# Patient Record
Sex: FEMALE | Race: WHITE | NOT HISPANIC OR LATINO | Employment: FULL TIME | URBAN - METROPOLITAN AREA
[De-identification: names, ages, dates, MRNs, and addresses within clinical notes are randomized per-mention and may not be internally consistent; named-entity substitution may affect disease eponyms.]

---

## 2017-07-28 ENCOUNTER — OFFICE VISIT (OUTPATIENT)
Dept: FAMILY MEDICINE | Facility: CLINIC | Age: 57
End: 2017-07-28
Payer: COMMERCIAL

## 2017-07-28 VITALS
HEIGHT: 66 IN | DIASTOLIC BLOOD PRESSURE: 74 MMHG | HEART RATE: 62 BPM | RESPIRATION RATE: 18 BRPM | SYSTOLIC BLOOD PRESSURE: 111 MMHG | BODY MASS INDEX: 25.07 KG/M2 | WEIGHT: 156 LBS

## 2017-07-28 DIAGNOSIS — Z00.01 ENCOUNTER FOR GENERAL ADULT MEDICAL EXAMINATION WITH ABNORMAL FINDINGS: Primary | ICD-10-CM

## 2017-07-28 DIAGNOSIS — Z11.59 ENCOUNTER FOR HCV SCREENING TEST FOR LOW RISK PATIENT: ICD-10-CM

## 2017-07-28 DIAGNOSIS — R22.2 CHEST WALL MASS: ICD-10-CM

## 2017-07-28 LAB
CHOLEST SERPL-MCNC: 183 MG/DL
GLUCOSE SERPL-MCNC: 83 MG/DL (ref 70–99)
HCV AB SERPL QL IA: NORMAL
HDLC SERPL-MCNC: 73 MG/DL
LDLC SERPL CALC-MCNC: 99 MG/DL
NONHDLC SERPL-MCNC: 110 MG/DL
TRIGL SERPL-MCNC: 57 MG/DL

## 2017-07-28 PROCEDURE — 80061 LIPID PANEL: CPT | Performed by: FAMILY MEDICINE

## 2017-07-28 PROCEDURE — 86803 HEPATITIS C AB TEST: CPT | Performed by: FAMILY MEDICINE

## 2017-07-28 PROCEDURE — G0145 SCR C/V CYTO,THINLAYER,RESCR: HCPCS | Performed by: FAMILY MEDICINE

## 2017-07-28 PROCEDURE — 99396 PREV VISIT EST AGE 40-64: CPT | Performed by: FAMILY MEDICINE

## 2017-07-28 PROCEDURE — 36415 COLL VENOUS BLD VENIPUNCTURE: CPT | Performed by: FAMILY MEDICINE

## 2017-07-28 PROCEDURE — 82947 ASSAY GLUCOSE BLOOD QUANT: CPT | Performed by: FAMILY MEDICINE

## 2017-07-28 PROCEDURE — 87624 HPV HI-RISK TYP POOLED RSLT: CPT | Performed by: FAMILY MEDICINE

## 2017-07-28 NOTE — PROGRESS NOTES
Mare,    All of the labs were normal or acceptable.    Please contact my office if you have questions.  Hep C pending.  Carley Kern M.D.

## 2017-07-28 NOTE — MR AVS SNAPSHOT
After Visit Summary   7/28/2017    Mare Dominguez    MRN: [id number]           Patient Information     Date Of Birth          1960        Visit Information        Provider Department      7/28/2017 9:20 AM Carley Kern MD [facility]        Today's Diagnoses     Encounter for general adult medical examination with abnormal findings    -  1    Encounter for HCV screening test for low risk patient        Chest wall mass          Care Instructions      Preventive Health Recommendations  Female Ages 50 - 64    Yearly exam: See your health care provider every year in order to  o Review health changes.   o Discuss preventive care.    o Review your medicines if your doctor has prescribed any.      Get a Pap test every three years (unless you have an abnormal result and your provider advises testing more often).    If you get Pap tests with HPV test, you only need to test every 5 years, unless you have an abnormal result.     You do not need a Pap test if your uterus was removed (hysterectomy) and you have not had cancer.    You should be tested each year for STDs (sexually transmitted diseases) if you're at risk.     Have a mammogram every 1 to 2 years.    Have a colonoscopy at age 50, or have a yearly FIT test (stool test). These exams screen for colon cancer.      Have a cholesterol test every 5 years, or more often if advised.    Have a diabetes test (fasting glucose) every three years. If you are at risk for diabetes, you should have this test more often.     If you are at risk for osteoporosis (brittle bone disease), think about having a bone density scan (DEXA).    Shots: Get a flu shot each year. Get a tetanus shot every 10 years.    Nutrition:     Eat at least 5 servings of fruits and vegetables each day.    Eat whole-grain bread, whole-wheat pasta and brown rice instead of white grains and rice.    Talk to your provider about Calcium and Vitamin D.  "    Lifestyle    Exercise at least 150 minutes a week (30 minutes a day, 5 days a week). This will help you control your weight and prevent disease.    Limit alcohol to one drink per day.    No smoking.     Wear sunscreen to prevent skin cancer.     See your dentist every six months for an exam and cleaning.    See your eye doctor every 1 to 2 years.            Follow-ups after your visit        Your next 10 appointments already scheduled     Sep 08, 2017  8:15 AM CDT   MA SCREENING DIGITAL BILATERAL with [facility]   [facility] ([facility])    [address] [place]  [facility] [address]   [phone]           Do not use any powder, lotion or deodorant under your arms or on your breast. If you do, we will ask you to remove it before your exam.  Wear comfortable, two-piece clothing.  If you have any allergies, tell your care team.  Bring any previous mammograms from other facilities or have them mailed to the breast center. Three-dimensional (3D) mammograms are available at [facility] locations in [facility], and [place]. [facility] locations include [place] and [facility] in [place]. Benefits of 3D mammograms include: - Improved rate of cancer detection - Decreases your chance of having to go back for more tests, which means fewer: - \"False-positive\" results (This means that there is an abnormal area but it isn't cancer.) - Invasive testing procedures, such as a biopsy or surgery - Can provide clearer images of the breast if you have dense breast tissue. 3D mammography is an optional exam that anyone can have with a 2D mammogram. It doesn't replace or take the place of a 2D mammogram. 2D mammograms remain an effective screening test for all women.  Not all insurance companies cover the cost of a 3D mammogram. Check with your insurance.              Future tests that were ordered for you today     Open Future Orders        Priority " "Expected Expires Ordered    US Head Neck Soft Tissue Routine  7/28/2018 7/28/2017            Who to contact     If you have questions or need follow up information about today's clinic visit or your schedule please contact [facility] directly at [phone].  Normal or non-critical lab and imaging results will be communicated to you by MyChart, letter or phone within 4 business days after the clinic has received the results. If you do not hear from us within 7 days, please contact the clinic through Dianxinhart or phone. If you have a critical or abnormal lab result, we will notify you by phone as soon as possible.  Submit refill requests through iFollo or call your pharmacy and they will forward the refill request to us. Please allow 3 business days for your refill to be completed.          Additional Information About Your Visit        Dianxinhart Information     iFollo gives you secure access to your electronic health record. If you see a primary care provider, you can also send messages to your care team and make appointments. If you have questions, please call your primary care clinic.  If you do not have a primary care provider, please call [phone] and they will assist you.        Care EveryWhere ID     This is your Care EveryWhere ID. This could be used by other organizations to access your [place] medical records  [id number]        Your Vitals Were     Pulse Respirations Height Last Period BMI (Body Mass Index)       62 18 5' 5.75\" (1.67 m) 10/18/2012 25.37 kg/m2        Blood Pressure from Last 3 Encounters:   07/28/17 111/74   07/18/16 105/62   07/18/16 126/78    Weight from Last 3 Encounters:   07/28/17 156 lb (70.8 kg)   07/18/16 159 lb (72.1 kg)   02/23/15 150 lb (68 kg)              We Performed the Following     Glucose     Hepatitis C antibody     HPV High Risk Types DNA Cervical     Lipid panel reflex to direct LDL     Pap imaged thin layer screen with HPV - recommended age 30 - " 65 years (select HPV order below)        Primary Care Provider Office Phone # Fax #    Carley Kern [phone]       [facility] [address] [address] [facility] [address]        Equal Access to Services     JACKIE PAGE AH: Hadii kyleigh ku hadanushkao Soomaali, waaxda luqadaha, qaybta kaalmada adeegyada, keila castlen eusebio soler lajessikatara flood. So [facility] [phone].    ATENCIÓN: Si habla español, tiene a linder disposición servicios gratuitos de asistencia lingüística. Llame al [phone].    We comply with applicable federal civil rights laws and [place] laws. We do not discriminate on the basis of race, color, national origin, age, disability sex, sexual orientation or gender identity.            Thank you!     Thank you for choosing [facility]  for your care. Our goal is always to provide you with excellent care. Hearing back from our patients is one way we can continue to improve our services. Please take a few minutes to complete the written survey that you may receive in the mail after your visit with us. Thank you!             Your Updated Medication List - Protect others around you: Learn how to safely use, store and throw away your medicines at www.disposemymeds.org.          This list is accurate as of: 7/28/17  9:41 AM.  Always use your most recent med list.                   Brand Name Dispense Instructions for use Diagnosis    ACIDOPHILUS PO           ASPIRIN LOW STRENGTH 81 MG chewable tablet   Generic drug:  aspirin      Take 81 mg by mouth daily.        calcium + D 600-200 MG-UNIT Tabs   Generic drug:  calcium carbonate-vitamin D      Take 2 tablets by mouth 2 times daily.        flax seed oil 1000 MG capsule      Take 1 capsule by mouth daily        glucosamine 500 MG Caps      2 daily        MIRALAX powder   Generic drug:  polyethylene glycol     1 Bottle    STIR 1 CAPFUL (17GM) IN 8 OZ OF LIQUID AND DRINK    Unspecified constipation       ZANTAC PO      Take   by mouth.

## 2017-07-28 NOTE — PROGRESS NOTES
SUBJECTIVE:   CC: Mare Dominguez is an 57 year old woman who presents for preventive health visit.     Healthy Habits:    Do you get at least three servings of calcium containing foods daily (dairy, green leafy vegetables, etc.)? yes    Amount of exercise or daily activities, outside of work: 6 day(s) per week for 20 min    Problems taking medications regularly No    Medication side effects: No    Have you had an eye exam in the past two years? yes    Do you see a dentist twice per year? yes    Do you have sleep apnea, excessive snoring or daytime drowsiness?no              Today's PHQ-2 Score: PHQ-2 ( 1999 Pfizer) 7/28/2017 7/18/2016   Q1: Little interest or pleasure in doing things 0 0   Q2: Feeling down, depressed or hopeless 0 0   PHQ-2 Score 0 0         Abuse: Current or Past(Physical, Sexual or Emotional)- No  Do you feel safe in your environment - Yes  Social History   Substance Use Topics     Smoking status: Never Smoker     Smokeless tobacco: Never Used     Alcohol use Yes      Comment: occ     The patient does not drink >3 drinks per day nor >7 drinks per week.    Reviewed orders with patient.  Reviewed health maintenance and updated orders accordingly - Yes  Labs reviewed in EPIC  BP Readings from Last 3 Encounters:   07/28/17 111/74   07/18/16 105/62   07/18/16 126/78    Wt Readings from Last 3 Encounters:   07/28/17 156 lb (70.8 kg)   07/18/16 159 lb (72.1 kg)   02/23/15 150 lb (68 kg)                      Patient over age 50, mutual decision to screen reflected in health maintenance.      Pertinent mammograms are reviewed under the imaging tab.  History of abnormal Pap smear: NO - age 30-65 PAP every 5 years with negative HPV co-testing recommended    Reviewed and updated as needed this visit by clinical staffTobacco  Allergies  Med Hx  Surg Hx  Fam Hx  Soc Hx        Reviewed and updated as needed this visit by Provider              ROS:  C: NEGATIVE for fever, chills, change in  "weight  INTEGUMENTARY/SKIN: mass on her chest that is mobile and smooth but elongated.  She'd like to know if this is fatty or a lymph node  E: NEGATIVE for vision changes or irritation  ENT: NEGATIVE for ear, mouth and throat problems  R: NEGATIVE for significant cough or SOB  B: NEGATIVE for masses, tenderness or discharge  CV: NEGATIVE for chest pain, palpitations or peripheral edema  GI: NEGATIVE for nausea, abdominal pain, heartburn, or change in bowel habits  : NEGATIVE for unusual urinary or vaginal symptoms. No vaginal bleeding.  M: NEGATIVE for significant arthralgias or myalgia  N: NEGATIVE for weakness, dizziness or paresthesias  P: NEGATIVE for changes in mood or affect     OBJECTIVE:   /74  Pulse 62  Resp 18  Ht 5' 5.75\" (1.67 m)  Wt 156 lb (70.8 kg)  LMP 10/18/2012  BMI 25.37 kg/m2  EXAM:  GENERAL: healthy, alert and no distress  EYES: Eyes grossly normal to inspection, PERRL and conjunctivae and sclerae normal  HENT: ear canals and TM's normal, nose and mouth without ulcers or lesions  NECK: no adenopathy, no asymmetry, masses, or scars and thyroid normal to palpation  RESP: lungs clear to auscultation - no rales, rhonchi or wheezes  BREAST: normal without masses, tenderness or nipple discharge and no palpable axillary masses or adenopathy  CV: regular rate and rhythm, normal S1 S2, no S3 or S4, no murmur, click or rub, no peripheral edema and peripheral pulses strong  ABDOMEN: soft, nontender, no hepatosplenomegaly, no masses and bowel sounds normal   (female): normal female external genitalia, normal urethral meatus , vaginal mucosa pink, moist, well rugated, normal cervix, adnexae, and uterus without masses. And pap obtained  MS: no gross musculoskeletal defects noted, no edema  SKIN: mass of anterior chest wall 1.5 cm in length and 1 cm in width, elongated.  Mobile, smooth, suspect lipoma  NEURO: Normal strength and tone, mentation intact and speech normal  PSYCH: mentation appears " "normal, affect normal/bright    ASSESSMENT/PLAN:   1. Encounter for general adult medical examination with abnormal findings     - Glucose  - Hepatitis C antibody  - Lipid panel reflex to direct LDL  - Pap imaged thin layer screen with HPV - recommended age 30 - 65 years (select HPV order below)  - HPV High Risk Types DNA Cervical    2. Encounter for HCV screening test for low risk patient       3. Chest wall mass  Suspect lipoma but r/o enlarged LN  - US Head Neck Soft Tissue; Future    COUNSELING:   Reviewed preventive health counseling, as reflected in patient instructions       Regular exercise       Healthy diet/nutrition         reports that she has never smoked. She has never used smokeless tobacco.    Estimated body mass index is 25.37 kg/(m^2) as calculated from the following:    Height as of this encounter: 5' 5.75\" (1.67 m).    Weight as of this encounter: 156 lb (70.8 kg).         Counseling Resources:  ATP IV Guidelines  Pooled Cohorts Equation Calculator  Breast Cancer Risk Calculator  FRAX Risk Assessment  ICSI Preventive Guidelines  Dietary Guidelines for Americans, 2010  USDA's MyPlate  ASA Prophylaxis  Lung CA Screening    Carley Kern MD  [facility]  "

## 2017-07-28 NOTE — NURSING NOTE
"Chief Complaint   Patient presents with     Physical     Medication Reconciliation     stopped asa       Initial /74  Pulse 62  Resp 18  Ht 5' 5.75\" (1.67 m)  Wt 156 lb (70.8 kg)  LMP 10/18/2012  BMI 25.37 kg/m2 Estimated body mass index is 25.37 kg/(m^2) as calculated from the following:    Height as of this encounter: 5' 5.75\" (1.67 m).    Weight as of this encounter: 156 lb (70.8 kg).  Medication Reconciliation: complete    "

## 2017-08-01 LAB
COPATH REPORT: NORMAL
PAP: NORMAL

## 2017-08-03 PROBLEM — R87.810 CERVICAL HIGH RISK HPV (HUMAN PAPILLOMAVIRUS) TEST POSITIVE: Status: ACTIVE | Noted: 2017-08-03

## 2017-08-03 LAB
FINAL DIAGNOSIS: ABNORMAL
HPV HR 12 DNA CVX QL NAA+PROBE: POSITIVE
HPV16 DNA SPEC QL NAA+PROBE: NEGATIVE
HPV18 DNA SPEC QL NAA+PROBE: NEGATIVE
SPECIMEN DESCRIPTION: ABNORMAL

## 2017-09-08 ENCOUNTER — HOSPITAL ENCOUNTER (OUTPATIENT)
Dept: MAMMOGRAPHY | Facility: CLINIC | Age: 57
Discharge: HOME | End: 2017-09-08
Attending: FAMILY MEDICINE | Admitting: FAMILY MEDICINE
Payer: COMMERCIAL

## 2017-09-08 ENCOUNTER — HOSPITAL ENCOUNTER (OUTPATIENT)
Dept: ULTRASOUND IMAGING | Facility: CLINIC | Age: 57
End: 2017-09-08
Attending: FAMILY MEDICINE
Payer: COMMERCIAL

## 2017-09-08 DIAGNOSIS — R22.2 CHEST WALL MASS: ICD-10-CM

## 2017-09-08 DIAGNOSIS — Z12.31 VISIT FOR SCREENING MAMMOGRAM: ICD-10-CM

## 2017-09-08 PROCEDURE — G0202 SCR MAMMO BI INCL CAD: HCPCS

## 2017-09-08 PROCEDURE — 76604 US EXAM CHEST: CPT

## 2018-09-01 ENCOUNTER — HEALTH MAINTENANCE LETTER (OUTPATIENT)
Age: 58
End: 2018-09-01

## 2018-09-14 ENCOUNTER — OFFICE VISIT (OUTPATIENT)
Dept: FAMILY MEDICINE | Facility: CLINIC | Age: 58
End: 2018-09-14
Payer: COMMERCIAL

## 2018-09-14 VITALS
SYSTOLIC BLOOD PRESSURE: 124 MMHG | DIASTOLIC BLOOD PRESSURE: 81 MMHG | HEART RATE: 60 BPM | HEIGHT: 66 IN | BODY MASS INDEX: 25.07 KG/M2 | WEIGHT: 156 LBS | OXYGEN SATURATION: 98 % | RESPIRATION RATE: 12 BRPM | TEMPERATURE: 96.7 F

## 2018-09-14 DIAGNOSIS — R30.0 DYSURIA: ICD-10-CM

## 2018-09-14 DIAGNOSIS — N39.0 URINARY TRACT INFECTION WITHOUT HEMATURIA, SITE UNSPECIFIED: Primary | ICD-10-CM

## 2018-09-14 LAB
ALBUMIN UR-MCNC: NEGATIVE MG/DL
APPEARANCE UR: ABNORMAL
BACTERIA #/AREA URNS HPF: ABNORMAL /HPF
BILIRUB UR QL STRIP: NEGATIVE
COLOR UR AUTO: YELLOW
GLUCOSE UR STRIP-MCNC: NEGATIVE MG/DL
HGB UR QL STRIP: ABNORMAL
KETONES UR STRIP-MCNC: NEGATIVE MG/DL
LEUKOCYTE ESTERASE UR QL STRIP: ABNORMAL
NITRATE UR QL: POSITIVE
NON-SQ EPI CELLS #/AREA URNS LPF: ABNORMAL /LPF
PH UR STRIP: 7 PH (ref 5–7)
RBC #/AREA URNS AUTO: ABNORMAL /HPF
SOURCE: ABNORMAL
SP GR UR STRIP: 1.01 (ref 1–1.03)
UROBILINOGEN UR STRIP-ACNC: 0.2 EU/DL (ref 0.2–1)
WBC #/AREA URNS AUTO: >100 /HPF

## 2018-09-14 PROCEDURE — 99213 OFFICE O/P EST LOW 20 MIN: CPT | Performed by: NURSE PRACTITIONER

## 2018-09-14 PROCEDURE — 87186 SC STD MICRODIL/AGAR DIL: CPT | Performed by: NURSE PRACTITIONER

## 2018-09-14 PROCEDURE — 87086 URINE CULTURE/COLONY COUNT: CPT | Performed by: NURSE PRACTITIONER

## 2018-09-14 PROCEDURE — 81001 URINALYSIS AUTO W/SCOPE: CPT | Performed by: NURSE PRACTITIONER

## 2018-09-14 PROCEDURE — 87088 URINE BACTERIA CULTURE: CPT | Performed by: NURSE PRACTITIONER

## 2018-09-14 RX ORDER — CIPROFLOXACIN 500 MG/1
500 TABLET, FILM COATED ORAL 2 TIMES DAILY
Qty: 14 TABLET | Refills: 0 | Status: SHIPPED | OUTPATIENT
Start: 2018-09-14 | End: 2018-10-05

## 2018-09-14 ASSESSMENT — PAIN SCALES - GENERAL: PAINLEVEL: MILD PAIN (2)

## 2018-09-14 NOTE — PROGRESS NOTES
SUBJECTIVE:   Mare Dominguez is a 58 year old female who presents to clinic today for the following health issues:      URINARY TRACT SYMPTOMS  Onset: 6 days    Description:   Painful urination (Dysuria): YES- discomfort  Blood in urine (Hematuria): no   Delay in urine (Hesitency): YES    Intensity: moderate    Progression of Symptoms:  worsening and intermittent    Accompanying Signs & Symptoms:  Fever/chills: no   Flank pain YES  Nausea and vomiting: no   Any vaginal symptoms: vaginal odor  Abdominal/Pelvic Pain: YES- pressure    History:   History of frequent UTI's: YES  History of kidney stones: no   Sexually Active: YES  Possibility of pregnancy: No    Precipitating factors:   none    Therapies Tried and outcome: Cranberry juice prn (contraindicated in Coumadin patients) and Increase fluid intake        Problem list and histories reviewed & adjusted, as indicated.  Further history obtained, clarified or corrected by provider:  Feels as though she is retaining urine.  Wakes up 2-3 times during the night to void.  Frequency and urgency noted.    Patient Active Problem List   Diagnosis     Esophageal reflux     Rectocele     URTICARIA of uncertain etiology     Constipation     hx of BCC on her nose     asymptomatic mucous in bowel movements on occasion      CARDIOVASCULAR SCREENING; LDL GOAL LESS THAN 160     S/P lumpectomy of breast     History of basal cell cancer     SK (seborrheic keratosis)     Lentigo     Angioma     Melanocytic nevus     Cervical high risk HPV (human papillomavirus) test positive     Past Surgical History:   Procedure Laterality Date     COLONOSCOPY  7/26/2012    Procedure: COLONOSCOPY;  Colonoscopy;  Surgeon: Norberto Rivera MD;  Location: [facility]     SURGICAL HISTORY OF -   1978    Ovarian cyst laparotomy      SURGICAL HISTORY OF -   1999    Discectomy lumbar     SURGICAL HISTORY OF -   8/30/1994    Mohs' excision basal cell carcinoma right nasal root       Social History  "  Substance Use Topics     Smoking status: Never Smoker     Smokeless tobacco: Never Used     Alcohol use Yes      Comment: occ     Family History   Problem Relation Age of Onset     Lipids Mother      Arrhythmia Mother      Neurologic Disorder Father      Parkinson's     Hypertension Maternal Grandmother      HEART DISEASE Maternal Grandfather      Cancer Paternal Grandfather      G.I.     Thyroid Disease Sister      Cancer     Cancer Sister      thyroid           Reviewed and updated as needed this visit by clinical staff  Tobacco  Allergies  Meds  Problems  Med Hx  Surg Hx  Fam Hx  Soc Hx        Reviewed and updated as needed this visit by Provider  Allergies  Meds  Problems         ROS:  Constitutional, HEENT, cardiovascular, pulmonary, gi and gu systems are negative, except as otherwise noted.    OBJECTIVE:     /81 (BP Location: Right arm, Patient Position: Chair, Cuff Size: Adult Regular)  Pulse 60  Temp 96.7  F (35.9  C) (Tympanic)  Resp 12  Ht 5' 5.75\" (1.67 m)  Wt 156 lb (70.8 kg)  LMP 10/18/2012  SpO2 98%  Breastfeeding? No  BMI 25.37 kg/m2  Body mass index is 25.37 kg/(m^2).  GENERAL: healthy, alert and no distress  RESP: lungs clear to auscultation - no rales, rhonchi or wheezes  CV: regular rate and rhythm, normal S1 S2, no S3 or S4, no murmur, click or rub, no peripheral edema and peripheral pulses strong  ABDOMEN: soft, nontender, no tenderness but says a sense of fullness in the suprapubic and bowel sounds normal    Diagnostic Test Results:  Results for orders placed or performed in visit on 09/14/18   *UA reflex to Microscopic and Culture ([place] and [facility] (except [place] and [place])   Result Value Ref Range    Color Urine Yellow     Appearance Urine Cloudy     Glucose Urine Negative NEG^Negative mg/dL    Bilirubin Urine Negative NEG^Negative    Ketones Urine Negative NEG^Negative mg/dL    Specific Gravity Urine 1.010 1.003 - 1.035    Blood Urine Trace (A) " NEG^Negative    pH Urine 7.0 5.0 - 7.0 pH    Protein Albumin Urine Negative NEG^Negative mg/dL    Urobilinogen Urine 0.2 0.2 - 1.0 EU/dL    Nitrite Urine Positive (A) NEG^Negative    Leukocyte Esterase Urine Large (A) NEG^Negative    Source Midstream Urine    Urine Microscopic   Result Value Ref Range    WBC Urine >100 (A) OTO5^0 - 5 /HPF    RBC Urine 2-5 (A) OTO2^O - 2 /HPF    Squamous Epithelial /LPF Urine Few FEW^Few /LPF    Bacteria Urine Many (A) NEG^Negative /HPF   Urine Culture Aerobic Bacterial   Result Value Ref Range    Specimen Description Midstream Urine     Special Requests Specimen received in preservative     Culture Micro 50,000 to 100,000 colonies/mL  Escherichia coli   (A)        Susceptibility    Escherichia coli - SEBAS     AMPICILLIN 8 Sensitive ug/mL     CEFAZOLIN* <=4 Sensitive ug/mL      * Cefazolin SEBAS breakpoints are for the treatment of uncomplicated urinary tract infections.  For the treatment of systemic infections, please contact the laboratory for additional testing.     CEFOXITIN <=4 Sensitive ug/mL     CEFTAZIDIME <=1 Sensitive ug/mL     CEFTRIAXONE <=1 Sensitive ug/mL     CIPROFLOXACIN <=0.25 Sensitive ug/mL     GENTAMICIN <=1 Sensitive ug/mL     LEVOFLOXACIN <=0.12 Sensitive ug/mL     NITROFURANTOIN <=16 Sensitive ug/mL     TOBRAMYCIN <=1 Sensitive ug/mL     Trimethoprim/Sulfa <=1/19 Sensitive ug/mL     AMPICILLIN/SULBACTAM 4 Sensitive ug/mL     Piperacillin/Tazo <=4 Sensitive ug/mL     CEFEPIME <=1 Sensitive ug/mL       ASSESSMENT/PLAN:     ASSESSMENT:  1. Urinary tract infection without hematuria, site unspecified    2. Dysuria        PLAN:  Orders Placed This Encounter     *UA reflex to Microscopic and Culture ([place] and [facility] (except [place] and [place])     Urine Microscopic     ciprofloxacin (CIPRO) 500 MG tablet       Patient Instructions   Complete full course of antibiotics even if you start to feel better    Patient agrees with plan of care as outlined. Call  or return to the clinic with any worsening of symptoms or no resolution. Medication side effects reviewed.      Lupis Maciel NP, APRN [facility]

## 2018-09-14 NOTE — MR AVS SNAPSHOT
"              After Visit Summary   9/14/2018    Mare Dominguez    MRN: [id number]           Patient Information     Date Of Birth          1960        Visit Information        Provider Department      9/14/2018 7:20 AM Lupis Maciel APRN CNP [facility]        Today's Diagnoses     Dysuria    -  1    Urinary tract infection without hematuria, site unspecified        Nonspecific finding on examination of urine          Care Instructions    Complete full course of antibiotics even if you start to feel better            Follow-ups after your visit        Follow-up notes from your care team     Return in 3 years (on 9/14/2021), or if symptoms worsen or fail to improve.      Your next 10 appointments already scheduled     Sep 28, 2018  7:45 AM CDT   MA SCREENING DIGITAL BILATERAL with [id number]   [facility] ([facility])    [address] [address] [phone]           Do not use any powder, lotion or deodorant under your arms or on your breast. If you do, we will ask you to remove it before your exam.  Wear comfortable, two-piece clothing.  If you have any allergies, tell your care team.  Bring any previous mammograms from other facilities or have them mailed to the breast center. Three-dimensional (3D) mammograms are available at [facility] locations in [facility], [facility], [facility], [place], [place], and [place]. [facility] locations include [place] [facility] in [place]. Benefits of 3D mammograms include: - Improved rate of cancer detection - Decreases your chance of having to go back for more tests, which means fewer: - \"False-positive\" results (This means that there is an abnormal area but it isn't cancer.) - Invasive testing procedures, such as a biopsy or surgery - Can provide clearer images of the breast if you have dense breast tissue. 3D mammography is an optional exam that anyone " can have with a 2D mammogram. It doesn't replace or take the place of a 2D mammogram. 2D mammograms remain an effective screening test for all women.  Not all insurance companies cover the cost of a 3D mammogram. Check with your insurance.            Sep 28, 2018 11:00 AM CDT   PHYSICAL with Carley Kern MD   [facility]    [address]  [facility] [id number] [phone]              Future tests that were ordered for you today     Open Future Orders        Priority Expected Expires Ordered    MA Screening Digital Bilateral Routine  9/13/2019 9/13/2018            Who to contact     If you have questions or need follow up information about today's clinic visit or your schedule please contact [facility] directly at [phone].  Normal or non-critical lab and imaging results will be communicated to you by Rosumhart, letter or phone within 4 business days after the clinic has received the results. If you do not hear from us within 7 days, please contact the clinic through Rosumhart or phone. If you have a critical or abnormal lab result, we will notify you by phone as soon as possible.  Submit refill requests through Emotient or call your pharmacy and they will forward the refill request to us. Please allow 3 business days for your refill to be completed.          Additional Information About Your Visit        Emotient Information     Emotient gives you secure access to your electronic health record. If you see a primary care provider, you can also send messages to your care team and make appointments. If you have questions, please call your primary care clinic.  If you do not have a primary care provider, please call [phone] and they will assist you.        Care EveryWhere ID     This is your Care EveryWhere ID. This could be used by other organizations to access your [place] medical records  [id number]        Your Vitals Were      "Pulse Temperature Respirations Height Last Period Pulse Oximetry    60 96.7  F (35.9  C) (Tympanic) 12 5' 5.75\" (1.67 m) 10/18/2012 98%    Breastfeeding? BMI (Body Mass Index)                No 25.37 kg/m2           Blood Pressure from Last 3 Encounters:   09/14/18 124/81   07/28/17 111/74   07/18/16 105/62    Weight from Last 3 Encounters:   09/14/18 156 lb (70.8 kg)   07/28/17 156 lb (70.8 kg)   07/18/16 159 lb (72.1 kg)              We Performed the Following     *UA reflex to Microscopic and Culture ([place] and [facility] (except [place] and [place])     Urine Culture Aerobic Bacterial     Urine Microscopic          Today's Medication Changes          These changes are accurate as of 9/14/18  8:08 AM.  If you have any questions, ask your nurse or doctor.               Start taking these medicines.        Dose/Directions    ciprofloxacin 500 MG tablet   Commonly known as:  CIPRO   Used for:  Urinary tract infection without hematuria, site unspecified   Started by:  Lupis Maciel APRN CNP        Dose:  500 mg   Take 1 tablet (500 mg) by mouth 2 times daily   Quantity:  14 tablet   Refills:  0            Where to get your medicines      These medications were sent to [id number] - [place], [place] - [address]  [address] [address]     Phone:  [phone]     ciprofloxacin 500 MG tablet                Primary Care Provider Office Phone # Fax #    Carley Kern [phone] [address] [address]        Equal Access to Services     LUCAS PAGE [facility]: Hadii kyleigh baker hadasho Soomaali, waaxda luqadaha, qaybta kaalmada adeegyada, keila flood. So [facility] [phone].    ATENCIÓN: Si habla español, tiene a linder disposición servicios gratuitos de asistencia lingüística. Llame al [phone].    We comply with applicable federal civil rights laws and [place] laws. We do not discriminate on the " basis of race, color, national origin, age, disability, sex, sexual orientation, or gender identity.            Thank you!     Thank you for choosing [facility]  for your care. Our goal is always to provide you with excellent care. Hearing back from our patients is one way we can continue to improve our services. Please take a few minutes to complete the written survey that you may receive in the mail after your visit with us. Thank you!             Your Updated Medication List - Protect others around you: Learn how to safely use, store and throw away your medicines at www.disposemymeds.org.          This list is accurate as of 9/14/18  8:08 AM.  Always use your most recent med list.                   Brand Name Dispense Instructions for use Diagnosis    ACIDOPHILUS PO           ASPIRIN LOW STRENGTH 81 MG chewable tablet   Generic drug:  aspirin      Take 81 mg by mouth daily.        calcium + D 600-200 MG-UNIT Tabs   Generic drug:  calcium carbonate-vitamin D      Take 2 tablets by mouth 2 times daily.        ciprofloxacin 500 MG tablet    CIPRO    14 tablet    Take 1 tablet (500 mg) by mouth 2 times daily    Urinary tract infection without hematuria, site unspecified       flax seed oil 1000 MG capsule      Take 1 capsule by mouth daily        glucosamine 500 MG Caps      2 daily        MIRALAX powder   Generic drug:  polyethylene glycol     1 Bottle    STIR 1 CAPFUL (17GM) IN 8 OZ OF LIQUID AND DRINK    Unspecified constipation       ZANTAC PO      Take  by mouth.

## 2018-09-15 LAB
BACTERIA SPEC CULT: ABNORMAL
Lab: ABNORMAL
SPECIMEN SOURCE: ABNORMAL

## 2018-09-29 ENCOUNTER — HEALTH MAINTENANCE LETTER (OUTPATIENT)
Age: 58
End: 2018-09-29

## 2018-10-05 ENCOUNTER — OFFICE VISIT (OUTPATIENT)
Dept: FAMILY MEDICINE | Facility: CLINIC | Age: 58
End: 2018-10-05
Payer: COMMERCIAL

## 2018-10-05 ENCOUNTER — TELEPHONE (OUTPATIENT)
Dept: PEDIATRICS | Facility: CLINIC | Age: 58
End: 2018-10-05

## 2018-10-05 ENCOUNTER — HOSPITAL ENCOUNTER (OUTPATIENT)
Dept: MAMMOGRAPHY | Facility: CLINIC | Age: 58
Discharge: HOME | End: 2018-10-05
Attending: FAMILY MEDICINE | Admitting: FAMILY MEDICINE
Payer: COMMERCIAL

## 2018-10-05 VITALS
HEIGHT: 66 IN | SYSTOLIC BLOOD PRESSURE: 118 MMHG | BODY MASS INDEX: 25.39 KG/M2 | WEIGHT: 158 LBS | OXYGEN SATURATION: 99 % | DIASTOLIC BLOOD PRESSURE: 66 MMHG | TEMPERATURE: 96.4 F | HEART RATE: 71 BPM | RESPIRATION RATE: 18 BRPM

## 2018-10-05 DIAGNOSIS — N95.2 ATROPHIC VAGINITIS: ICD-10-CM

## 2018-10-05 DIAGNOSIS — J34.89 NASAL SORE: ICD-10-CM

## 2018-10-05 DIAGNOSIS — R87.810 CERVICAL HIGH RISK HPV (HUMAN PAPILLOMAVIRUS) TEST POSITIVE: ICD-10-CM

## 2018-10-05 DIAGNOSIS — Z12.31 VISIT FOR SCREENING MAMMOGRAM: ICD-10-CM

## 2018-10-05 DIAGNOSIS — Z00.00 ROUTINE GENERAL MEDICAL EXAMINATION AT A HEALTH CARE FACILITY: Primary | ICD-10-CM

## 2018-10-05 DIAGNOSIS — J34.89 NASAL SORE: Primary | ICD-10-CM

## 2018-10-05 PROCEDURE — 87624 HPV HI-RISK TYP POOLED RSLT: CPT | Performed by: FAMILY MEDICINE

## 2018-10-05 PROCEDURE — 99396 PREV VISIT EST AGE 40-64: CPT | Performed by: FAMILY MEDICINE

## 2018-10-05 PROCEDURE — 77063 BREAST TOMOSYNTHESIS BI: CPT

## 2018-10-05 PROCEDURE — G0145 SCR C/V CYTO,THINLAYER,RESCR: HCPCS | Performed by: FAMILY MEDICINE

## 2018-10-05 RX ORDER — ESTRADIOL 10 UG/1
10 INSERT VAGINAL
Qty: 24 TABLET | Refills: 3 | Status: SHIPPED | OUTPATIENT
Start: 2018-10-08 | End: 2018-10-08

## 2018-10-05 RX ORDER — MUPIROCIN 20 MG/G
OINTMENT TOPICAL 3 TIMES DAILY
Qty: 22 G | Refills: 1 | Status: SHIPPED | OUTPATIENT
Start: 2018-10-05 | End: 2018-10-10

## 2018-10-05 ASSESSMENT — PAIN SCALES - GENERAL: PAINLEVEL: NO PAIN (0)

## 2018-10-05 NOTE — PATIENT INSTRUCTIONS
Thank you for choosing [facility].  You may be receiving a survey in the mail from Elizabeth Whitten regarding your visit today.  Please take a few minutes to complete and return the survey to let us know how we are doing.      Our Clinic hours are:  Mondays    7:20 am - 7 pm  Tues - Fri  7:20 am - 5 pm    Clinic Phone: [phone]    The clinic lab opens at 7:30 am Mon - Fri and appointments are required.    [facility] [place] [facility]. [phone]  Monday  8 am - 7pm  Tues - Fri 8 am - 5:30 pm         Preventive Health Recommendations  Female Ages 50 - 64    Yearly exam: See your health care provider every year in order to  o Review health changes.   o Discuss preventive care.    o Review your medicines if your doctor has prescribed any.      Get a Pap test every three years (unless you have an abnormal result and your provider advises testing more often).    If you get Pap tests with HPV test, you only need to test every 5 years, unless you have an abnormal result.     You do not need a Pap test if your uterus was removed (hysterectomy) and you have not had cancer.    You should be tested each year for STDs (sexually transmitted diseases) if you're at risk.     Have a mammogram every 1 to 2 years.    Have a colonoscopy at age 50, or have a yearly FIT test (stool test). These exams screen for colon cancer.      Have a cholesterol test every 5 years, or more often if advised.    Have a diabetes test (fasting glucose) every three years. If you are at risk for diabetes, you should have this test more often.     If you are at risk for osteoporosis (brittle bone disease), think about having a bone density scan (DEXA).    Shots: Get a flu shot each year. Get a tetanus shot every 10 years.    Nutrition:     Eat at least 5 servings of fruits and vegetables each day.    Eat whole-grain bread, whole-wheat pasta and brown rice instead of white grains and rice.    Get adequate Calcium and Vitamin D.      Lifestyle    Exercise at least 150 minutes a week (30 minutes a day, 5 days a week). This will help you control your weight and prevent disease.    Limit alcohol to one drink per day.    No smoking.     Wear sunscreen to prevent skin cancer.     See your dentist every six months for an exam and cleaning.    See your eye doctor every 1 to 2 years.

## 2018-10-05 NOTE — MR AVS SNAPSHOT
After Visit Summary   10/5/2018    Mare Dominguez    MRN: [id number]           Patient Information     Date Of Birth          1960        Visit Information        Provider Department      10/5/2018 11:00 AM Carley Kern MD [facility]        Today's Diagnoses     Routine general medical examination at a health care facility    -  1    Cervical high risk HPV (human papillomavirus) test positive        Nasal sore        Atrophic vaginitis          Care Instructions          Thank you for choosing [facility].  You may be receiving a survey in the mail from Elizabeth Whitten regarding your visit today.  Please take a few minutes to complete and return the survey to let us know how we are doing.      Our Clinic hours are:  Mondays    7:20 am - 7 pm  Tues -  Fri  7:20 am - 5 pm    Clinic Phone: [phone]    The clinic lab opens at 7:30 am Mon - Fri and appointments are required.    [facility]  Ph. [phone]  Monday  8 am - 7pm  Tues - Fri 8 am - 5:30 pm         Preventive Health Recommendations  Female Ages 50 - 64    Yearly exam: See your health care provider every year in order to  o Review health changes.   o Discuss preventive care.    o Review your medicines if your doctor has prescribed any.      Get a Pap test every three years (unless you have an abnormal result and your provider advises testing more often).    If you get Pap tests with HPV test, you only need to test every 5 years, unless you have an abnormal result.     You do not need a Pap test if your uterus was removed (hysterectomy) and you have not had cancer.    You should be tested each year for STDs (sexually transmitted diseases) if you're at risk.     Have a mammogram every 1 to 2 years.    Have a colonoscopy at age 50, or have a yearly FIT test (stool test). These exams screen for colon cancer.      Have a cholesterol test every 5 years, or more often if advised.    Have a  diabetes test (fasting glucose) every three years. If you are at risk for diabetes, you should have this test more often.     If you are at risk for osteoporosis (brittle bone disease), think about having a bone density scan (DEXA).    Shots: Get a flu shot each year. Get a tetanus shot every 10 years.    Nutrition:     Eat at least 5 servings of fruits and vegetables each day.    Eat whole-grain bread, whole-wheat pasta and brown rice instead of white grains and rice.    Get adequate Calcium and Vitamin D.     Lifestyle    Exercise at least 150 minutes a week (30 minutes a day, 5 days a week). This will help you control your weight and prevent disease.    Limit alcohol to one drink per day.    No smoking.     Wear sunscreen to prevent skin cancer.     See your dentist every six months for an exam and cleaning.    See your eye doctor every 1 to 2 years.            Follow-ups after your visit        Future tests that were ordered for you today     Open Future Orders        Priority Expected Expires Ordered    MA Screen Bilateral w/Yusuf Routine  9/13/2019 9/13/2018            Who to contact     If you have questions or need follow up information about today's clinic visit or your schedule please contact [facility] directly at [phone].  Normal or non-critical lab and imaging results will be communicated to you by RotaPosthart, letter or phone within 4 business days after the clinic has received the results. If you do not hear from us within 7 days, please contact the clinic through RotaPosthart or phone. If you have a critical or abnormal lab result, we will notify you by phone as soon as possible.  Submit refill requests through [employer] or call your pharmacy and they will forward the refill request to us. Please allow 3 business days for your refill to be completed.          Additional Information About Your Visit        [employer] Information     [employer] gives you secure access to your electronic health  "record. If you see a primary care provider, you can also send messages to your care team and make appointments. If you have questions, please call your primary care clinic.  If you do not have a primary care provider, please call [phone] and they will assist you.        Care EveryWhere ID     This is your Care EveryWhere ID. This could be used by other organizations to access your [place] medical records  [id number]        Your Vitals Were     Pulse Temperature Respirations Height Last Period Pulse Oximetry    71 96.4  F (35.8  C) (Tympanic) 18 5' 5.75\" (1.67 m) 10/18/2012 99%    Breastfeeding? BMI (Body Mass Index)                No 25.7 kg/m2           Blood Pressure from Last 3 Encounters:   10/05/18 118/66   09/14/18 124/81   07/28/17 111/74    Weight from Last 3 Encounters:   10/05/18 158 lb (71.7 kg)   09/14/18 156 lb (70.8 kg)   07/28/17 156 lb (70.8 kg)              We Performed the Following     HPV High Risk Types DNA Cervical     Pap imaged thin layer screen with HPV - recommended age 30 - 65 years (select HPV order below)          Today's Medication Changes          These changes are accurate as of 10/5/18 11:18 AM.  If you have any questions, ask your nurse or doctor.               Start taking these medicines.        Dose/Directions    estradiol 10 MCG Tabs vaginal tablet   Commonly known as:  VAGIFEM   Used for:  Atrophic vaginitis   Started by:  Carley Kern MD        Dose:  10 mcg   Start taking on:  10/8/2018   Place 1 tablet (10 mcg) vaginally twice a week   Quantity:  24 tablet   Refills:  3       mupirocin 2 % nasal ointment   Commonly known as:  BACTROBAN NASAL   Used for:  Nasal sore   Started by:  Carley Kern MD        Dose:  1 g   Apply 1 g into each nare 3 times daily for 5 days   Quantity:  22 g   Refills:  0         Stop taking these medicines if you haven't already. Please contact your care team if you have questions.     ASPIRIN LOW STRENGTH 81 MG chewable tablet "   Generic drug:  aspirin   Stopped by:  Carley Kern MD                Where to get your medicines      These medications were sent to [id number] - [place], [place] - [address]  [address] [address]     Phone:  [phone]     estradiol 10 MCG Tabs vaginal tablet    mupirocin 2 % nasal ointment                Primary Care Provider Office Phone # Fax #    Carley Kern [phone] [address] [address]        Equal Access to Services     [facility]: Hadii aad ku hadasho Soomaali, waaxda luqadaha, qaybta kaalmada adeegyada, waxay idiin hayaan adeeg kharash la'aan . So [facility] [phone].    ATENCIÓN: Si habla español, tiene a linder disposición servicios gratuitos de asistencia lingüística. [facility] [phone].    We comply with applicable federal civil rights laws and [place] laws. We do not discriminate on the basis of race, color, national origin, age, disability, sex, sexual orientation, or gender identity.            Thank you!     Thank you for choosing [facility]  for your care. Our goal is always to provide you with excellent care. Hearing back from our patients is one way we can continue to improve our services. Please take a few minutes to complete the written survey that you may receive in the mail after your visit with us. Thank you!             Your Updated Medication List - Protect others around you: Learn how to safely use, store and throw away your medicines at www.disposemymeds.org.          This list is accurate as of 10/5/18 11:18 AM.  Always use your most recent med list.                   Brand Name Dispense Instructions for use Diagnosis    ACIDOPHILUS PO           calcium + D 600-200 MG-UNIT Tabs   Generic drug:  calcium carbonate-vitamin D      Take 2 tablets by mouth 2 times daily.        estradiol 10 MCG Tabs vaginal tablet   Start taking on:  10/8/2018    VAGIFEM    24  tablet    Place 1 tablet (10 mcg) vaginally twice a week    Atrophic vaginitis       flax seed oil 1000 MG capsule      Take 1 capsule by mouth daily        glucosamine 500 MG Caps      2 daily        MIRALAX powder   Generic drug:  polyethylene glycol     1 Bottle    STIR 1 CAPFUL (17GM) IN 8 OZ OF LIQUID AND DRINK    Unspecified constipation       mupirocin 2 % nasal ointment    BACTROBAN NASAL    22 g    Apply 1 g into each nare 3 times daily for 5 days    Nasal sore       ZANTAC PO      Take  by mouth.

## 2018-10-05 NOTE — PROGRESS NOTES
SUBJECTIVE:   CC: Mare Dominguez is an 58 year old woman who presents for preventive health visit.     Physical   Annual:     Getting at least 3 servings of Calcium per day:  Yes    Bi-annual eye exam:  Yes    Dental care twice a year:  Yes    Sleep apnea or symptoms of sleep apnea:  None    Diet:  Carbohydrate counting    Frequency of exercise:  4-5 days/week    Duration of exercise:  15-30 minutes    Taking medications regularly:  Yes    Medication side effects:  Not applicable    Additional concerns today:  YES        Today's PHQ-2 Score:   PHQ-2 ( 1999 Pf[employer]) 10/5/2018   Q1: Little interest or pleasure in doing things 0   Q2: Feeling down, depressed or hopeless 0   PHQ-2 Score 0   Q1: Little interest or pleasure in doing things Not at all   Q2: Feeling down, depressed or hopeless Not at all   PHQ-2 Score 0       Abuse: Current or Past(Physical, Sexual or Emotional)- No  Do you feel safe in your environment - Yes    Social History   Substance Use Topics     Smoking status: Never Smoker     Smokeless tobacco: Never Used     Alcohol use Yes      Comment: occ     Alcohol Use 10/5/2018   If you drink alcohol do you typically have greater than 3 drinks per day OR greater than 7 drinks per week? Yes   AUDIT SCORE  4     AUDIT - Alcohol Use Disorders Identification Test - Reproduced from the World Health Organization Audit 2001 (Second Edition) 10/5/2018   1.  How often do you have a drink containing alcohol? 4 or more times a week   2.  How many drinks containing alcohol do you have on a typical day when you are drinking? 1 or 2   3.  How often do you have five or more drinks on one occasion? Never   4.  How often during the last year have you found that you were not able to stop drinking once you had started? Never   5.  How often during the last year have you failed to do what was normally expected of you because of drinking? Never   6.  How often during the last year have you needed a first drink in the  morning to get yourself going after a heavy drinking session? Never   7.  How often during the last year have you had a feeling of guilt or remorse after drinking? Never   8.  How often during the last year have you been unable to remember what happened the night before because of your drinking? Never   9.  Have you or someone else been injured because of your drinking? No   10. Has a relative, friend, doctor or other health care worker been concerned about your drinking or suggested you cut down? No   TOTAL SCORE 4       Reviewed orders with patient.  Reviewed health maintenance and updated orders accordingly - Yes  Labs reviewed in EPIC  BP Readings from Last 3 Encounters:   10/05/18 118/66   09/14/18 124/81   07/28/17 111/74    Wt Readings from Last 3 Encounters:   10/05/18 158 lb (71.7 kg)   09/14/18 156 lb (70.8 kg)   07/28/17 156 lb (70.8 kg)               Patient over age 50, mutual decision to screen reflected in health maintenance.    Pertinent mammograms are reviewed under the imaging tab.  History of abnormal Pap smear: YES - updated in Problem List and Health Maintenance accordingly  PAP / HPV Latest Ref Rng & Units 7/28/2017 11/21/2014 5/6/2011   PAP - NIL NIL NIL   HPV 16 DNA NEG Negative - -   HPV 18 DNA NEG Negative - -   OTHER HR HPV NEG Positive(A) - -     Reviewed and updated as needed this visit by clinical staff  Allergies  Meds  Problems         Reviewed and updated as needed this visit by Provider            Review of Systems  CONSTITUTIONAL: NEGATIVE for fever, chills, change in weight  INTEGUMENTARY/SKIN: NEGATIVE for worrisome rashes, moles or lesions  EYES: NEGATIVE for vision changes or irritation  ENT: NEGATIVE for ear, mouth and throat problems  RESP: NEGATIVE for significant cough or SOB  BREAST: NEGATIVE for masses, tenderness or discharge  CV: NEGATIVE for chest pain, palpitations or peripheral edema  GI: NEGATIVE for nausea, abdominal pain, heartburn, or change in bowel  habits   menopausal female: vaginal dryness, painful intercourse  MUSCULOSKELETAL: NEGATIVE for significant arthralgias or myalgia  NEURO: NEGATIVE for weakness, dizziness or paresthesias  PSYCHIATRIC: NEGATIVE for changes in mood or affect      OBJECTIVE:   LMP 10/18/2012  Physical Exam  GENERAL: healthy, alert and no distress  EYES: Eyes grossly normal to inspection, PERRL and conjunctivae and sclerae normal  HENT: ear canals and TM's normal, nose and mouth without ulcers or lesions  NECK: no adenopathy, no asymmetry, masses, or scars and thyroid normal to palpation  RESP: lungs clear to auscultation - no rales, rhonchi or wheezes  BREAST: normal without masses, tenderness or nipple discharge and no palpable axillary masses or adenopathy  CV: regular rate and rhythm, normal S1 S2, no S3 or S4, no murmur, click or rub, no peripheral edema and peripheral pulses strong  ABDOMEN: soft, nontender, no hepatosplenomegaly, no masses and bowel sounds normal   (female): normal female external genitalia, normal urethral meatus , vaginal mucosal atrophy and normal cervix, adnexae, and uterus without masses.  MS: no gross musculoskeletal defects noted, no edema  SKIN: no suspicious lesions or rashes  NEURO: Normal strength and tone, mentation intact and speech normal  PSYCH: mentation appears normal, affect normal/bright    Diagnostic Test Results:  none     ASSESSMENT/PLAN:   1. Routine general medical examination at a health care facility       2. Cervical high risk HPV (human papillomavirus) test positive     - Pap imaged thin layer screen with HPV - recommended age 30 - 65 years (select HPV order below)  - HPV High Risk Types DNA Cervical    3. Nasal sore     - mupirocin (BACTROBAN NASAL) 2 % nasal ointment; Apply 1 g into each nare 3 times daily for 5 days  Dispense: 22 g; Refill: 0    4. Atrophic vaginitis   risks, benefits and alternatives discussed   - call if not well covered or expensive, would then try estradiol  "0.5 mg vaginally  - estradiol (VAGIFEM) 10 MCG TABS vaginal tablet; Place 1 tablet (10 mcg) vaginally twice a week  Dispense: 24 tablet; Refill: 3    COUNSELING:  Reviewed preventive health counseling, as reflected in patient instructions       Regular exercise       Healthy diet/nutrition    BP Readings from Last 1 Encounters:   09/14/18 124/81     Estimated body mass index is 25.37 kg/(m^2) as calculated from the following:    Height as of 9/14/18: 5' 5.75\" (1.67 m).    Weight as of 9/14/18: 156 lb (70.8 kg).           reports that she has never smoked. She has never used smokeless tobacco.      Counseling Resources:  ATP IV Guidelines  Pooled Cohorts Equation Calculator  Breast Cancer Risk Calculator  FRAX Risk Assessment  ICSI Preventive Guidelines  Dietary Guidelines for Americans, 2010  USDA's MyPlate  ASA Prophylaxis  Lung CA Screening    Carley Kern MD  [facility]  "

## 2018-10-05 NOTE — TELEPHONE ENCOUNTER
[facility] pharmacy [facility] called stating its more cost effective for insurance to have mupirocin topical versus mupirocin nasal.    Sia MEIER  Station

## 2018-10-08 ENCOUNTER — TELEPHONE (OUTPATIENT)
Dept: FAMILY MEDICINE | Facility: CLINIC | Age: 58
End: 2018-10-08

## 2018-10-08 DIAGNOSIS — N95.2 ATROPHIC VAGINITIS: Primary | ICD-10-CM

## 2018-10-08 RX ORDER — ESTRADIOL 0.5 MG/1
TABLET ORAL
Qty: 24 TABLET | Refills: 3 | Status: SHIPPED | OUTPATIENT
Start: 2018-10-08 | End: 2020-10-21

## 2018-10-08 NOTE — TELEPHONE ENCOUNTER
[facility] note regarding estradiol (VAGIFEM) 10 MCG TABS vaginal tablet:  Copay oover $100 per month. Mare mentioned  had discussed the option of Estradiol ORAL tabs being used off-lable intravaginally. Would  prescribe this?

## 2018-10-09 LAB
COPATH REPORT: NORMAL
PAP: NORMAL

## 2018-10-12 LAB
FINAL DIAGNOSIS: NORMAL
HPV HR 12 DNA CVX QL NAA+PROBE: NEGATIVE
HPV16 DNA SPEC QL NAA+PROBE: NEGATIVE
HPV18 DNA SPEC QL NAA+PROBE: NEGATIVE
SPECIMEN DESCRIPTION: NORMAL
SPECIMEN SOURCE CVX/VAG CYTO: NORMAL

## 2019-08-09 ENCOUNTER — ANESTHESIA (OUTPATIENT)
Dept: SURGERY | Facility: CLINIC | Age: 59
End: 2019-08-09
Payer: COMMERCIAL

## 2019-08-09 ENCOUNTER — ANESTHESIA EVENT (OUTPATIENT)
Dept: SURGERY | Facility: CLINIC | Age: 59
End: 2019-08-09
Payer: COMMERCIAL

## 2019-08-09 ENCOUNTER — APPOINTMENT (OUTPATIENT)
Dept: CT IMAGING | Facility: CLINIC | Age: 59
End: 2019-08-09
Attending: FAMILY MEDICINE
Payer: COMMERCIAL

## 2019-08-09 ENCOUNTER — HOSPITAL ENCOUNTER (OUTPATIENT)
Facility: CLINIC | Age: 59
Discharge: HOME | End: 2019-08-09
Attending: FAMILY MEDICINE | Admitting: SURGERY
Payer: COMMERCIAL

## 2019-08-09 VITALS
SYSTOLIC BLOOD PRESSURE: 93 MMHG | DIASTOLIC BLOOD PRESSURE: 52 MMHG | RESPIRATION RATE: 16 BRPM | HEART RATE: 75 BPM | OXYGEN SATURATION: 94 % | TEMPERATURE: 98.8 F | BODY MASS INDEX: 26.83 KG/M2 | WEIGHT: 165 LBS

## 2019-08-09 DIAGNOSIS — K35.80 ACUTE APPENDICITIS, UNSPECIFIED ACUTE APPENDICITIS TYPE: ICD-10-CM

## 2019-08-09 LAB
ALBUMIN SERPL-MCNC: 3.5 G/DL (ref 3.4–5)
ALP SERPL-CCNC: 72 U/L (ref 40–150)
ALT SERPL W P-5'-P-CCNC: 84 U/L (ref 0–50)
ANION GAP SERPL CALCULATED.3IONS-SCNC: 6 MMOL/L (ref 3–14)
AST SERPL W P-5'-P-CCNC: 96 U/L (ref 0–45)
BASOPHILS # BLD AUTO: 0 10E9/L (ref 0–0.2)
BASOPHILS NFR BLD AUTO: 0.2 %
BILIRUB SERPL-MCNC: 1.5 MG/DL (ref 0.2–1.3)
BUN SERPL-MCNC: 8 MG/DL (ref 7–30)
CALCIUM SERPL-MCNC: 8.6 MG/DL (ref 8.5–10.1)
CHLORIDE SERPL-SCNC: 108 MMOL/L (ref 94–109)
CO2 SERPL-SCNC: 26 MMOL/L (ref 20–32)
CREAT SERPL-MCNC: 0.72 MG/DL (ref 0.52–1.04)
DIFFERENTIAL METHOD BLD: ABNORMAL
EOSINOPHIL # BLD AUTO: 0 10E9/L (ref 0–0.7)
EOSINOPHIL NFR BLD AUTO: 0 %
ERYTHROCYTE [DISTWIDTH] IN BLOOD BY AUTOMATED COUNT: 13.2 % (ref 10–15)
GFR SERPL CREATININE-BSD FRML MDRD: >90 ML/MIN/{1.73_M2}
GLUCOSE SERPL-MCNC: 99 MG/DL (ref 70–99)
HCT VFR BLD AUTO: 37.3 % (ref 35–47)
HGB BLD-MCNC: 11.7 G/DL (ref 11.7–15.7)
IMM GRANULOCYTES # BLD: 0.1 10E9/L (ref 0–0.4)
IMM GRANULOCYTES NFR BLD: 0.6 %
LYMPHOCYTES # BLD AUTO: 1.1 10E9/L (ref 0.8–5.3)
LYMPHOCYTES NFR BLD AUTO: 7.4 %
MCH RBC QN AUTO: 30.7 PG (ref 26.5–33)
MCHC RBC AUTO-ENTMCNC: 31.4 G/DL (ref 31.5–36.5)
MCV RBC AUTO: 98 FL (ref 78–100)
MONOCYTES # BLD AUTO: 1.1 10E9/L (ref 0–1.3)
MONOCYTES NFR BLD AUTO: 7.4 %
NEUTROPHILS # BLD AUTO: 12.1 10E9/L (ref 1.6–8.3)
NEUTROPHILS NFR BLD AUTO: 84.4 %
NRBC # BLD AUTO: 0 10*3/UL
NRBC BLD AUTO-RTO: 0 /100
PLATELET # BLD AUTO: 247 10E9/L (ref 150–450)
POTASSIUM SERPL-SCNC: 3.6 MMOL/L (ref 3.4–5.3)
PROT SERPL-MCNC: 7.6 G/DL (ref 6.8–8.8)
RBC # BLD AUTO: 3.81 10E12/L (ref 3.8–5.2)
SODIUM SERPL-SCNC: 140 MMOL/L (ref 133–144)
WBC # BLD AUTO: 14.3 10E9/L (ref 4–11)

## 2019-08-09 PROCEDURE — 99285 EMERGENCY DEPT VISIT HI MDM: CPT | Mod: Z6 | Performed by: FAMILY MEDICINE

## 2019-08-09 PROCEDURE — 25800030 ZZH RX IP 258 OP 636: Performed by: NURSE ANESTHETIST, CERTIFIED REGISTERED

## 2019-08-09 PROCEDURE — 96375 TX/PRO/DX INJ NEW DRUG ADDON: CPT | Mod: 59 | Performed by: FAMILY MEDICINE

## 2019-08-09 PROCEDURE — 25000128 H RX IP 250 OP 636: Performed by: FAMILY MEDICINE

## 2019-08-09 PROCEDURE — 25000125 ZZHC RX 250: Performed by: NURSE ANESTHETIST, CERTIFIED REGISTERED

## 2019-08-09 PROCEDURE — 71000027 ZZH RECOVERY PHASE 2 EACH 15 MINS: Performed by: SURGERY

## 2019-08-09 PROCEDURE — 88304 TISSUE EXAM BY PATHOLOGIST: CPT | Mod: 26 | Performed by: SURGERY

## 2019-08-09 PROCEDURE — 85025 COMPLETE CBC W/AUTO DIFF WBC: CPT | Performed by: FAMILY MEDICINE

## 2019-08-09 PROCEDURE — 27110028 ZZH OR GENERAL SUPPLY NON-STERILE: Performed by: SURGERY

## 2019-08-09 PROCEDURE — 25000128 H RX IP 250 OP 636: Performed by: NURSE ANESTHETIST, CERTIFIED REGISTERED

## 2019-08-09 PROCEDURE — 71000014 ZZH RECOVERY PHASE 1 LEVEL 2 FIRST HR: Performed by: SURGERY

## 2019-08-09 PROCEDURE — 99203 OFFICE O/P NEW LOW 30 MIN: CPT | Mod: 57 | Performed by: SURGERY

## 2019-08-09 PROCEDURE — 99285 EMERGENCY DEPT VISIT HI MDM: CPT | Mod: 25 | Performed by: FAMILY MEDICINE

## 2019-08-09 PROCEDURE — 96361 HYDRATE IV INFUSION ADD-ON: CPT | Mod: 59 | Performed by: FAMILY MEDICINE

## 2019-08-09 PROCEDURE — 25000566 ZZH SEVOFLURANE, EA 15 MIN: Performed by: SURGERY

## 2019-08-09 PROCEDURE — 27210794 ZZH OR GENERAL SUPPLY STERILE: Performed by: SURGERY

## 2019-08-09 PROCEDURE — 37000009 ZZH ANESTHESIA TECHNICAL FEE, EACH ADDTL 15 MIN: Performed by: SURGERY

## 2019-08-09 PROCEDURE — 88304 TISSUE EXAM BY PATHOLOGIST: CPT | Performed by: SURGERY

## 2019-08-09 PROCEDURE — 37000008 ZZH ANESTHESIA TECHNICAL FEE, 1ST 30 MIN: Performed by: SURGERY

## 2019-08-09 PROCEDURE — 96374 THER/PROPH/DIAG INJ IV PUSH: CPT | Mod: 59 | Performed by: FAMILY MEDICINE

## 2019-08-09 PROCEDURE — 44970 LAPAROSCOPY APPENDECTOMY: CPT | Performed by: SURGERY

## 2019-08-09 PROCEDURE — 36000056 ZZH SURGERY LEVEL 3 1ST 30 MIN: Performed by: SURGERY

## 2019-08-09 PROCEDURE — 25000125 ZZHC RX 250: Performed by: SURGERY

## 2019-08-09 PROCEDURE — 74177 CT ABD & PELVIS W/CONTRAST: CPT

## 2019-08-09 PROCEDURE — 36000058 ZZH SURGERY LEVEL 3 EA 15 ADDTL MIN: Performed by: SURGERY

## 2019-08-09 PROCEDURE — 25000125 ZZHC RX 250: Performed by: FAMILY MEDICINE

## 2019-08-09 PROCEDURE — 80053 COMPREHEN METABOLIC PANEL: CPT | Performed by: FAMILY MEDICINE

## 2019-08-09 RX ORDER — HYDROCODONE BITARTRATE AND ACETAMINOPHEN 5; 325 MG/1; MG/1
1-2 TABLET ORAL EVERY 4 HOURS PRN
Status: DISCONTINUED | OUTPATIENT
Start: 2019-08-09 | End: 2019-08-10 | Stop reason: HOSPADM

## 2019-08-09 RX ORDER — MEPERIDINE HYDROCHLORIDE 25 MG/ML
12.5 INJECTION INTRAMUSCULAR; INTRAVENOUS; SUBCUTANEOUS
Status: DISCONTINUED | OUTPATIENT
Start: 2019-08-09 | End: 2019-08-10 | Stop reason: HOSPADM

## 2019-08-09 RX ORDER — FENTANYL CITRATE 50 UG/ML
25-50 INJECTION, SOLUTION INTRAMUSCULAR; INTRAVENOUS
Status: DISCONTINUED | OUTPATIENT
Start: 2019-08-09 | End: 2019-08-10 | Stop reason: HOSPADM

## 2019-08-09 RX ORDER — ONDANSETRON 2 MG/ML
INJECTION INTRAMUSCULAR; INTRAVENOUS PRN
Status: DISCONTINUED | OUTPATIENT
Start: 2019-08-09 | End: 2019-08-09

## 2019-08-09 RX ORDER — SODIUM CHLORIDE, SODIUM LACTATE, POTASSIUM CHLORIDE, CALCIUM CHLORIDE 600; 310; 30; 20 MG/100ML; MG/100ML; MG/100ML; MG/100ML
INJECTION, SOLUTION INTRAVENOUS CONTINUOUS
Status: DISCONTINUED | OUTPATIENT
Start: 2019-08-09 | End: 2019-08-09 | Stop reason: HOSPADM

## 2019-08-09 RX ORDER — KETOROLAC TROMETHAMINE 30 MG/ML
30 INJECTION, SOLUTION INTRAMUSCULAR; INTRAVENOUS EVERY 6 HOURS PRN
Status: DISCONTINUED | OUTPATIENT
Start: 2019-08-09 | End: 2019-08-10 | Stop reason: HOSPADM

## 2019-08-09 RX ORDER — LIDOCAINE HYDROCHLORIDE AND EPINEPHRINE 5; 5 MG/ML; UG/ML
INJECTION, SOLUTION INFILTRATION; PERINEURAL PRN
Status: DISCONTINUED | OUTPATIENT
Start: 2019-08-09 | End: 2019-08-09 | Stop reason: HOSPADM

## 2019-08-09 RX ORDER — ONDANSETRON 4 MG/1
4 TABLET, ORALLY DISINTEGRATING ORAL EVERY 30 MIN PRN
Status: DISCONTINUED | OUTPATIENT
Start: 2019-08-09 | End: 2019-08-10 | Stop reason: HOSPADM

## 2019-08-09 RX ORDER — FENTANYL CITRATE 50 UG/ML
25-50 INJECTION, SOLUTION INTRAMUSCULAR; INTRAVENOUS
Status: DISCONTINUED | OUTPATIENT
Start: 2019-08-09 | End: 2019-08-09 | Stop reason: HOSPADM

## 2019-08-09 RX ORDER — SODIUM CHLORIDE, SODIUM LACTATE, POTASSIUM CHLORIDE, CALCIUM CHLORIDE 600; 310; 30; 20 MG/100ML; MG/100ML; MG/100ML; MG/100ML
INJECTION, SOLUTION INTRAVENOUS CONTINUOUS
Status: DISCONTINUED | OUTPATIENT
Start: 2019-08-09 | End: 2019-08-10 | Stop reason: HOSPADM

## 2019-08-09 RX ORDER — ONDANSETRON 2 MG/ML
4 INJECTION INTRAMUSCULAR; INTRAVENOUS EVERY 30 MIN PRN
Status: DISCONTINUED | OUTPATIENT
Start: 2019-08-09 | End: 2019-08-10 | Stop reason: HOSPADM

## 2019-08-09 RX ORDER — IOPAMIDOL 755 MG/ML
80 INJECTION, SOLUTION INTRAVASCULAR ONCE
Status: COMPLETED | OUTPATIENT
Start: 2019-08-09 | End: 2019-08-09

## 2019-08-09 RX ORDER — EPHEDRINE SULFATE 50 MG/ML
INJECTION, SOLUTION INTRAVENOUS PRN
Status: DISCONTINUED | OUTPATIENT
Start: 2019-08-09 | End: 2019-08-09

## 2019-08-09 RX ORDER — FENTANYL CITRATE 50 UG/ML
INJECTION, SOLUTION INTRAMUSCULAR; INTRAVENOUS PRN
Status: DISCONTINUED | OUTPATIENT
Start: 2019-08-09 | End: 2019-08-09

## 2019-08-09 RX ORDER — KETOROLAC TROMETHAMINE 30 MG/ML
30 INJECTION, SOLUTION INTRAMUSCULAR; INTRAVENOUS ONCE
Status: COMPLETED | OUTPATIENT
Start: 2019-08-09 | End: 2019-08-09

## 2019-08-09 RX ORDER — NALOXONE HYDROCHLORIDE 0.4 MG/ML
.1-.4 INJECTION, SOLUTION INTRAMUSCULAR; INTRAVENOUS; SUBCUTANEOUS
Status: DISCONTINUED | OUTPATIENT
Start: 2019-08-09 | End: 2019-08-10 | Stop reason: HOSPADM

## 2019-08-09 RX ORDER — HYDROMORPHONE HYDROCHLORIDE 2 MG/1
1 TABLET ORAL EVERY 4 HOURS PRN
Qty: 10 TABLET | Refills: 0 | Status: SHIPPED | OUTPATIENT
Start: 2019-08-09 | End: 2019-08-26

## 2019-08-09 RX ORDER — BUPIVACAINE HYDROCHLORIDE AND EPINEPHRINE 5; 5 MG/ML; UG/ML
INJECTION, SOLUTION PERINEURAL PRN
Status: DISCONTINUED | OUTPATIENT
Start: 2019-08-09 | End: 2019-08-10 | Stop reason: HOSPADM

## 2019-08-09 RX ORDER — HYDROCODONE BITARTRATE AND ACETAMINOPHEN 5; 325 MG/1; MG/1
1-2 TABLET ORAL EVERY 4 HOURS PRN
Qty: 15 TABLET | Refills: 0 | Status: SHIPPED | OUTPATIENT
Start: 2019-08-09 | End: 2019-08-26

## 2019-08-09 RX ORDER — LIDOCAINE 40 MG/G
CREAM TOPICAL
Status: DISCONTINUED | OUTPATIENT
Start: 2019-08-09 | End: 2019-08-09 | Stop reason: HOSPADM

## 2019-08-09 RX ORDER — KETOROLAC TROMETHAMINE 30 MG/ML
INJECTION, SOLUTION INTRAMUSCULAR; INTRAVENOUS PRN
Status: DISCONTINUED | OUTPATIENT
Start: 2019-08-09 | End: 2019-08-09

## 2019-08-09 RX ORDER — PROPOFOL 10 MG/ML
INJECTION, EMULSION INTRAVENOUS PRN
Status: DISCONTINUED | OUTPATIENT
Start: 2019-08-09 | End: 2019-08-09

## 2019-08-09 RX ORDER — LIDOCAINE HYDROCHLORIDE 10 MG/ML
INJECTION, SOLUTION INFILTRATION; PERINEURAL PRN
Status: DISCONTINUED | OUTPATIENT
Start: 2019-08-09 | End: 2019-08-09

## 2019-08-09 RX ORDER — DEXAMETHASONE SODIUM PHOSPHATE 4 MG/ML
INJECTION, SOLUTION INTRA-ARTICULAR; INTRALESIONAL; INTRAMUSCULAR; INTRAVENOUS; SOFT TISSUE PRN
Status: DISCONTINUED | OUTPATIENT
Start: 2019-08-09 | End: 2019-08-09

## 2019-08-09 RX ADMIN — SODIUM CHLORIDE 1000 ML: 9 INJECTION, SOLUTION INTRAVENOUS at 16:06

## 2019-08-09 RX ADMIN — KETOROLAC TROMETHAMINE 30 MG: 30 INJECTION, SOLUTION INTRAMUSCULAR at 16:08

## 2019-08-09 RX ADMIN — ONDANSETRON 4 MG: 2 INJECTION INTRAMUSCULAR; INTRAVENOUS at 18:08

## 2019-08-09 RX ADMIN — PROPOFOL 150 MG: 10 INJECTION, EMULSION INTRAVENOUS at 18:08

## 2019-08-09 RX ADMIN — FENTANYL CITRATE 100 MCG: 50 INJECTION, SOLUTION INTRAMUSCULAR; INTRAVENOUS at 18:13

## 2019-08-09 RX ADMIN — EPHEDRINE SULFATE 10 MG: 50 INJECTION, SOLUTION INTRAVENOUS at 18:23

## 2019-08-09 RX ADMIN — EPHEDRINE SULFATE 5 MG: 50 INJECTION, SOLUTION INTRAVENOUS at 18:20

## 2019-08-09 RX ADMIN — EPHEDRINE SULFATE 5 MG: 50 INJECTION, SOLUTION INTRAVENOUS at 18:17

## 2019-08-09 RX ADMIN — SUGAMMADEX 200 MG: 100 INJECTION, SOLUTION INTRAVENOUS at 18:50

## 2019-08-09 RX ADMIN — FENTANYL CITRATE 150 MCG: 50 INJECTION, SOLUTION INTRAMUSCULAR; INTRAVENOUS at 18:08

## 2019-08-09 RX ADMIN — DEXAMETHASONE SODIUM PHOSPHATE 8 MG: 4 INJECTION, SOLUTION INTRA-ARTICULAR; INTRALESIONAL; INTRAMUSCULAR; INTRAVENOUS; SOFT TISSUE at 18:08

## 2019-08-09 RX ADMIN — ROCURONIUM BROMIDE 50 MG: 10 INJECTION INTRAVENOUS at 18:08

## 2019-08-09 RX ADMIN — KETOROLAC TROMETHAMINE 30 MG: 30 INJECTION, SOLUTION INTRAMUSCULAR at 18:55

## 2019-08-09 RX ADMIN — IOPAMIDOL 80 ML: 755 INJECTION, SOLUTION INTRAVENOUS at 16:36

## 2019-08-09 RX ADMIN — SODIUM CHLORIDE, POTASSIUM CHLORIDE, SODIUM LACTATE AND CALCIUM CHLORIDE: 600; 310; 30; 20 INJECTION, SOLUTION INTRAVENOUS at 18:05

## 2019-08-09 RX ADMIN — LIDOCAINE HYDROCHLORIDE 40 MG: 10 INJECTION, SOLUTION INFILTRATION; PERINEURAL at 18:08

## 2019-08-09 RX ADMIN — TAZOBACTAM SODIUM AND PIPERACILLIN SODIUM 3.38 G: 375; 3 INJECTION, SOLUTION INTRAVENOUS at 17:50

## 2019-08-09 RX ADMIN — MIDAZOLAM 2 MG: 1 INJECTION INTRAMUSCULAR; INTRAVENOUS at 18:05

## 2019-08-09 RX ADMIN — SODIUM CHLORIDE 100 ML: 9 INJECTION, SOLUTION INTRAVENOUS at 16:38

## 2019-08-09 ASSESSMENT — ENCOUNTER SYMPTOMS
CARDIOVASCULAR NEGATIVE: 1
PSYCHIATRIC NEGATIVE: 1
ABDOMINAL PAIN: 1
DIARRHEA: 0
MUSCULOSKELETAL NEGATIVE: 1
EYES NEGATIVE: 1
HEMATOLOGIC/LYMPHATIC NEGATIVE: 1
RESPIRATORY NEGATIVE: 1
NAUSEA: 1
FEVER: 1
NEUROLOGICAL NEGATIVE: 1

## 2019-08-09 NOTE — ANESTHESIA PREPROCEDURE EVALUATION
Anesthesia Pre-Procedure Evaluation    Patient: Mare Dominguez   MRN: [id number] : 1960          Preoperative Diagnosis: * No surgery found *        Past Medical History:   Diagnosis Date     Basal cell carcinoma      Cervical high risk HPV (human papillomavirus) test positive 2017    Not 16/18     Cervicalgia     , MRI 10/05 c6-c7 sever left foraminal stenosis, s/p epidural injection 10/20/05     Degeneration of lumbar or lumbosacral intervertebral disc     degen disc lumbar      Other and unspecified malignant neoplasm of skin of other and unspecified parts of face     Basal cell cancer right nose     Other and unspecified ovarian cyst     ovarian cyst -Mare thought that her right ovary was removed, but in fact in retrospect it appears that the left has been removed.     S/P lumpectomy of breast 10/22/2001    excision left breast     Past Surgical History:   Procedure Laterality Date     COLONOSCOPY  2012    Procedure: COLONOSCOPY;  Colonoscopy;  Surgeon: Norberto Rivera MD;  Location: [facility]     SURGICAL HISTORY OF -       Ovarian cyst laparotomy      SURGICAL HISTORY OF -       Discectomy lumbar     SURGICAL HISTORY OF -   1994    Mohs' excision basal cell carcinoma right nasal root       Anesthesia Evaluation     . Pt has had prior anesthetic. Type: General and MAC    History of anesthetic complications   - PONV        ROS/MED HX    ENT/Pulmonary:  - neg pulmonary ROS     Neurologic:  - neg neurologic ROS     Cardiovascular:  - neg cardiovascular ROS       METS/Exercise Tolerance:  >4 METS   Hematologic:  - neg hematologic  ROS       Musculoskeletal: Comment: Degeneration of lumbar or lumbosacral intervertebral disc  (+)  other musculoskeletal- Cervicalgia      GI/Hepatic: Comment: Acute appendicitis    (+) GERD       Renal/Genitourinary:  - ROS Renal section negative       Endo:  - neg endo ROS       Psychiatric:  - neg psychiatric ROS       Infectious Disease:  " - neg infectious disease ROS       Malignancy:   (+) Malignancy History of Skin  Skin CA Remission status post Surgery,         Other:    - neg other ROS                      Physical Exam  Normal systems: cardiovascular, pulmonary and dental    Airway   Mallampati: I  TM distance: >3 FB  Neck ROM: full    Dental     Cardiovascular   Rhythm and rate: regular and normal      Pulmonary    breath sounds clear to auscultation            Lab Results   Component Value Date    WBC 14.3 (H) 08/09/2019    HGB 11.7 08/09/2019    HCT 37.3 08/09/2019     08/09/2019    SED 7 07/27/2005     08/09/2019    POTASSIUM 3.6 08/09/2019    CHLORIDE 108 08/09/2019    CO2 26 08/09/2019    BUN 8 08/09/2019    CR 0.72 08/09/2019    GLC 99 08/09/2019    LD 8.6 08/09/2019    ALBUMIN 3.5 08/09/2019    PROTTOTAL 7.6 08/09/2019    ALT 84 (H) 08/09/2019    AST 96 (H) 08/09/2019    ALKPHOS 72 08/09/2019    BILITOTAL 1.5 (H) 08/09/2019    LIPASE 97 10/21/2005    TSH 2.31 09/01/2011       Preop Vitals  BP Readings from Last 3 Encounters:   08/09/19 112/56   10/05/18 118/66   09/14/18 124/81    Pulse Readings from Last 3 Encounters:   08/09/19 59   10/05/18 71   09/14/18 60      Resp Readings from Last 3 Encounters:   08/09/19 20   10/05/18 18   09/14/18 12    SpO2 Readings from Last 3 Encounters:   08/09/19 95%   10/05/18 99%   09/14/18 98%      Temp Readings from Last 1 Encounters:   08/09/19 37.8  C (100  F)    Ht Readings from Last 1 Encounters:   10/05/18 1.67 m (5' 5.75\")      Wt Readings from Last 1 Encounters:   08/09/19 74.8 kg (165 lb)    Estimated body mass index is 26.83 kg/m  as calculated from the following:    Height as of 10/5/18: 1.67 m (5' 5.75\").    Weight as of this encounter: 74.8 kg (165 lb).       Anesthesia Plan      History & Physical Review  History and physical reviewed and following examination; no interval change.    ASA Status:  1 emergent.    NPO Status:  > 6 hours    Plan for General and ETT with Propofol " induction. Maintenance will be Balanced.    PONV prophylaxis:  Ondansetron (or other 5HT-3) and Dexamethasone or Solumedrol  Additional equipment: Videolaryngoscope      Postoperative Care  Postoperative pain management:  IV analgesics, Oral pain medications and Multi-modal analgesia.      Consents  Anesthetic plan, risks, benefits and alternatives discussed with:  Patient..                 Lj Guy CRNA, APRN CRNA

## 2019-08-09 NOTE — ED PROVIDER NOTES
History     Chief Complaint   Patient presents with     Abdominal Pain     abd pain was evaluated in [facility] this am dx appy unable to do CT or surgery in [place] diarrhea      HPI  Mare Dominguez is a 59 year old female who presents with abdominal pain suspicious for appendicitis.    Mare, age 59, [place]  She had generalized abdominal pain for about 12 hours which then localized more to the right lower quadrant.  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  While attempting to return home, the pain became quite intense.  She was seen in an emergency room at [facility].  There she was given IV fluid and Toradol and some narcotic pain med and given 1 dose of Zosyn.  This enabled her to complete her journey back to this area.  She has not taken any solid food today and only a small amount of liquid.  She is not vomiting but has nausea.    Previous surgeries include a surgery for ovarian cyst when she was 18 years old and she had back surgery about 10 years ago.  She gets some postop nausea.    Patient is otherwise quite healthy.    Allergies:  Allergies   Allergen Reactions     Latex Rash     Topical rash from gloves     Sulfa Drugs Hives       Problem List:    Patient Active Problem List    Diagnosis Date Noted     Cervical high risk HPV (human papillomavirus) test positive 08/03/2017     Priority: Medium     7/28/17 NIL Pap, + HR HPV (Not 16/18). Plan cotest in 1 year.   10/5/18 Pap: NIL/neg HPV. Plan Pap+HPV in 3 years.       History of basal cell cancer 09/24/2012     Priority: Medium     SK (seborrheic keratosis) 09/24/2012     Priority: Medium     Lentigo 09/24/2012     Priority: Medium     Angioma 09/24/2012     Priority: Medium     Melanocytic nevus 09/24/2012     Priority: Medium     (Problem list name updated by automated process. Provider to review and confirm.)       CARDIOVASCULAR SCREENING; LDL GOAL LESS THAN 160  10/31/2010     Priority: Medium     asymptomatic mucous in bowel movements on occasion  11/05/2008     Priority: Medium     November 5, 2008: discussed with Dr. Ortiz and his recommend that no work up at this time. If Mare notes abdominal complaints, changes to bowel movements, or increased frequency of episodes then do colonoscopy to rule out inflammatory bowel disease. Mare will call me if this is the case.       hx of BCC on her nose 01/29/2008     Priority: Medium     November 5, 2008: normal skin exam. recommend yearly derm skin checks due to early history of skin cancer.       URTICARIA of uncertain etiology 02/17/2007     Priority: Medium     February 17, 2007: recommend that Mare follow up with allergist.       Constipation 02/17/2007     Priority: Medium     February 17, 2007: increase water, fiber, metamucil daily. If symptoms do not improve, will do a colonoscopy.  Problem list name updated by automated process. Provider to review       Esophageal reflux 07/27/2005     Priority: Medium     EGD 12/05: normal   February 17, 2007: stable on Nexium.       Rectocele 07/27/2005     Priority: Medium     S/P lumpectomy of breast 10/22/2001     Priority: Medium     excision left breast          Past Medical History:    Past Medical History:   Diagnosis Date     Basal cell carcinoma      Cervical high risk HPV (human papillomavirus) test positive 07/28/2017     Cervicalgia      Degeneration of lumbar or lumbosacral intervertebral disc      Other and unspecified malignant neoplasm of skin of other and unspecified parts of face 1994     Other and unspecified ovarian cyst 1978     S/P lumpectomy of breast 10/22/2001       Past Surgical History:    Past Surgical History:   Procedure Laterality Date     COLONOSCOPY  7/26/2012    Procedure: COLONOSCOPY;  Colonoscopy;  Surgeon: Norberto Rivera MD;  Location: [facility]     SURGICAL HISTORY OF -   1978    Ovarian cyst laparotomy      SURGICAL HISTORY OF -   1999     Discectomy lumbar     SURGICAL HISTORY OF -   8/30/1994    Mohs' excision basal cell carcinoma right nasal root       Family History:    Family History   Problem Relation Age of Onset     Lipids Mother      Arrhythmia Mother      Neurologic Disorder Father         Parkinson's     Hypertension Maternal Grandmother      Heart Disease Maternal Grandfather      Cancer Paternal Grandfather         G.I.     Thyroid Disease Sister         Cancer     Cancer Sister         thyroid       Social History:  Marital Status:   [2]  Social History     Tobacco Use     Smoking status: Never Smoker     Smokeless tobacco: Never Used   Substance Use Topics     Alcohol use: Yes     Comment: occ     Drug use: No        Medications:      Calcium Carbonate-Vitamin D (CALCIUM + D) 600-200 MG-UNIT per tablet   estradiol (ESTRACE) 0.5 MG tablet   Flaxseed, Linseed, (FLAX SEED OIL) 1000 MG capsule   GLUCOSAMINE 500 MG OR CAPS   Lactobacillus (ACIDOPHILUS PO)   MIRALAX OR POWD   Ranitidine HCl (ZANTAC PO)         Review of Systems   Constitutional: Positive for fever.   HENT: Negative.    Eyes: Negative.    Respiratory: Negative.    Cardiovascular: Negative.    Gastrointestinal: Positive for abdominal pain and nausea. Negative for diarrhea.   Genitourinary: Negative.    Musculoskeletal: Negative.    Skin: Negative for rash.   Neurological: Negative.    Hematological: Negative.    Psychiatric/Behavioral: Negative.        Physical Exam   BP: 102/63  Pulse: 59  Heart Rate: 72  Temp: 100.5  F (38.1  C)  Resp: 20  Weight: 74.8 kg (165 lb)  SpO2: 95 %      Physical Exam   Constitutional: She appears well-developed and well-nourished.   HENT:   Head: Normocephalic.   Mouth/Throat: Oropharynx is clear and moist.   Eyes: No scleral icterus.   Cardiovascular: Normal rate and regular rhythm.   No murmur heard.  Pulmonary/Chest: Breath sounds normal.   Abdominal: Bowel sounds are normal. There is tenderness in the right lower quadrant. There is  guarding.   Neurological: She is alert.   Skin: Skin is warm and dry.   Psychiatric: She has a normal mood and affect.       ED Course        Procedures               Critical Care time:  none               Results for orders placed or performed during the hospital encounter of 08/09/19 (from the past 24 hour(s))   CBC with platelets differential   Result Value Ref Range    WBC 14.3 (H) 4.0 - 11.0 10e9/L    RBC Count 3.81 3.8 - 5.2 10e12/L    Hemoglobin 11.7 11.7 - 15.7 g/dL    Hematocrit 37.3 35.0 - 47.0 %    MCV 98 78 - 100 fl    MCH 30.7 26.5 - 33.0 pg    MCHC 31.4 (L) 31.5 - 36.5 g/dL    RDW 13.2 10.0 - 15.0 %    Platelet Count 247 150 - 450 10e9/L    Diff Method Automated Method     % Neutrophils 84.4 %    % Lymphocytes 7.4 %    % Monocytes 7.4 %    % Eosinophils 0.0 %    % Basophils 0.2 %    % Immature Granulocytes 0.6 %    Nucleated RBCs 0 0 /100    Absolute Neutrophil 12.1 (H) 1.6 - 8.3 10e9/L    Absolute Lymphocytes 1.1 0.8 - 5.3 10e9/L    Absolute Monocytes 1.1 0.0 - 1.3 10e9/L    Absolute Eosinophils 0.0 0.0 - 0.7 10e9/L    Absolute Basophils 0.0 0.0 - 0.2 10e9/L    Abs Immature Granulocytes 0.1 0 - 0.4 10e9/L    Absolute Nucleated RBC 0.0    Comprehensive metabolic panel   Result Value Ref Range    Sodium 140 133 - 144 mmol/L    Potassium 3.6 3.4 - 5.3 mmol/L    Chloride 108 94 - 109 mmol/L    Carbon Dioxide 26 20 - 32 mmol/L    Anion Gap 6 3 - 14 mmol/L    Glucose 99 70 - 99 mg/dL    Urea Nitrogen 8 7 - 30 mg/dL    Creatinine 0.72 0.52 - 1.04 mg/dL    GFR Estimate >90 >60 mL/min/[1.73_m2]    GFR Estimate If Black >90 >60 mL/min/[1.73_m2]    Calcium 8.6 8.5 - 10.1 mg/dL    Bilirubin Total 1.5 (H) 0.2 - 1.3 mg/dL    Albumin 3.5 3.4 - 5.0 g/dL    Protein Total 7.6 6.8 - 8.8 g/dL    Alkaline Phosphatase 72 40 - 150 U/L    ALT 84 (H) 0 - 50 U/L    AST 96 (H) 0 - 45 U/L   CT Abdomen Pelvis w Contrast    Narrative    CT ABDOMEN AND PELVIS WITH CONTRAST 8/9/2019 4:54 PM    HISTORY: Right lower quadrant pain and  fever.    TECHNIQUE: Helical axial scans from dome of liver through pubic  symphysis with 80 mL Isovue 370 IV contrast. Radiation dose for this  scan was reduced using automated exposure control, adjustment of the  mA and/or kV according to patient size, or iterative reconstruction  technique.    COMPARISON: None.    FINDINGS: Moderate distention of the gallbladder. No pericholecystic  inflammatory changes or CT evidence for gallbladder wall thickening.  The liver is otherwise unremarkable. The spleen, pancreas, bilateral  adrenal glands and kidneys bilaterally appear normal. The bowel and  mesentery in the upper abdomen are unremarkable.    Scans through the pelvis show a markedly dilated appendix measuring up  to 1.9 cm. There is a 1 cm calcification at the base of the appendix  as well as a few other tiny calcifications within the appendiceal  lumen consistent with appendicoliths. Surrounding inflammatory  induration is present and the findings are consistent with acute  appendicitis. No evidence for abscess or perforation. Trace amount of  free fluid in the cul-de-sac.      Impression    IMPRESSION:  1. Acute uncomplicated appendicitis with appendicoliths.  2. Distended gallbladder.  3. Trace amount of free fluid in the cul-de-sac.       Medications   piperacillin-tazobactam (ZOSYN) infusion 3.375 g (has no administration in time range)   ketorolac (TORADOL) injection 30 mg (30 mg Intravenous Given 8/9/19 1608)   0.9% sodium chloride BOLUS (1,000 mLs Intravenous New Bag 8/9/19 1606)   iopamidol (ISOVUE-370) solution 80 mL (80 mLs Intravenous Given 8/9/19 1636)   sodium chloride 0.9 % bag 500mL for CT scan flush use (100 mLs As instructed Given 8/9/19 1638)       16:10 - initial evaluation.        Assessments & Plan (with Medical Decision Making)     This patient has a classic presentation of appendicitis.  Laboratory work and CT support this.  Please see details of history as noted above.  Findings were  discussed with patient and her .    I have contacted Dr. Rodriguez who is covering for general surgery and he will be consulting.          I have reviewed the nursing notes.    I have reviewed the findings, diagnosis, plan and need for follow up with the patient.       New Prescriptions    No medications on file       Final diagnoses:   Acute appendicitis, unspecified acute appendicitis type       8/9/2019   [facility] EMERGENCY DEPARTMENT     Mikal Sheehan MD  08/09/19 1329

## 2019-08-10 NOTE — ANESTHESIA POSTPROCEDURE EVALUATION
Patient: Mare Dominguez    Procedure(s):  APPENDECTOMY, LAPAROSCOPIC    Diagnosis:Acute appendicitis  Diagnosis Additional Information: No value filed.    Anesthesia Type:  General, ETT    Note:  Anesthesia Post Evaluation    Patient location during evaluation: PACU and Bedside  Patient participation: Able to fully participate in evaluation  Level of consciousness: awake  Pain management: adequate  Airway patency: patent  Cardiovascular status: acceptable  Respiratory status: acceptable  Hydration status: acceptable  PONV: none     Anesthetic complications: None          Last vitals:  Vitals:    08/09/19 1750 08/09/19 1916 08/09/19 1930   BP: 98/55 98/49 97/47   Pulse: 75     Resp:  12 12   Temp:  37.1  C (98.8  F)    SpO2:  93% 93%         Electronically Signed By: Abisai Mcmillan CRNA, SANDIE TREADWELL  August 9, 2019  7:36 PM

## 2019-08-10 NOTE — BRIEF OP NOTE
[facility]   Brief Operative Note    Pre-operative diagnosis: Acute appendicitis   Post-operative diagnosis Acute Appendicitis     Procedure: Procedure(s):  APPENDECTOMY, LAPAROSCOPIC   Surgeon(s): Surgeon(s) and Role:     * Paul Rodriguez MD - Primary   Estimated blood loss: * No values recorded between 8/9/2019  6:22 PM and 8/9/2019  7:05 PM *    Specimens: ID Type Source Tests Collected by Time Destination   A :  Organ Appendix SURGICAL PATHOLOGY EXAM Paul Rodriguez MD 8/9/2019  6:41 PM       Findings: 1. Acute appendicitis  2. Not ruptured.  3. EBL 5cc

## 2019-08-10 NOTE — ANESTHESIA CARE TRANSFER NOTE
Patient: Mare Dominguez    Procedure(s):  APPENDECTOMY, LAPAROSCOPIC    Diagnosis: Acute appendicitis  Diagnosis Additional Information: No value filed.    Anesthesia Type:   General, ETT     Note:  Airway :Nasal Cannula  Patient transferred to:PACU  Handoff Report: Identifed the Patient, Identified the Reponsible Provider, Reviewed the pertinent medical history, Discussed the surgical course, Reviewed Intra-OP anesthesia mangement and issues during anesthesia, Set expectations for post-procedure period and Allowed opportunity for questions and acknowledgement of understanding      Vitals: (Last set prior to Anesthesia Care Transfer)    CRNA VITALS  8/9/2019 1838 - 8/9/2019 1935      8/9/2019             Pulse:  81    SpO2:  89 %  (Abnormal)                 Electronically Signed By: Abisai Mcmillan CRNA, SANDIE TREADWELL  August 9, 2019  7:35 PM

## 2019-08-10 NOTE — DISCHARGE INSTRUCTIONS
Same Day Surgery Discharge Instructions  Special Precautions After Surgery - Adult    1. It is not unusual to feel lightheaded or faint, up to 24 hours after surgery or while taking pain medication.  If you have these symptoms; sit for a few minutes before standing and have someone assist you when getting up.  2. You should rest and relax for the next 24 hours and must have someone stay with you for at least 24 hours after your discharge.  3. DO NOT DRIVE any vehicle or operate mechanical equipment for 24 hours following the end of your surgery.  DO NOT DRIVE while taking narcotic pain medications that have been prescribed by your physician.  If you had a limb operated on, you must be able to use it fully to drive.  4. DO NOT drink alcoholic beverages for 24 hours following surgery or while taking prescription pain medication.  5. Drink clear liquids (apple juice, ginger ale, broth, 7-Up, etc.).  Progress to your regular diet as you feel able.  6. Any questions call your physician and do not make important decisions for 24 hours.       ________________________________________________________________________________________________________________________________  IMPORTANT NUMBERS:    [facility] Main Number:  [phone], [phone]  Pharmacy:  [phone]  Same Day Surgery:  [phone], Monday - Friday until 8:30 p.m.  Urgent Care:  [phone]  Emergency Room:  [phone]                                                                               [facility]:  [phone] - Dr. Rodriguez            Wash incisions daily with soap and water. Some mild redness or swelling is expected. If draining, cover with dry gauze.    No lifting restrictions.  You may lift as comfort permits.    Okay to use ice pack over wound as necessary for comfort.    Use pain medication as necessary but avoid constipating side effects. Ibuprofen okay but avoid Tylenol as your pain medication already contains  this.    Diet as tolerated. No restrictions.    Follow up with Dr Rodriguez in 1-2 weeks.    You may return to work when you feel up to it.  No driving while taking narcotics.

## 2019-08-10 NOTE — OP NOTE
Procedure Date: 08/09/2019      PREOPERATIVE DIAGNOSIS:  Acute appendicitis.      POSTOPERATIVE DIAGNOSIS:  Acute appendicitis.      PROCEDURE:  Laparoscopic appendectomy.      SURGEON:  Paul Rodriguez MD      ANESTHESIA:  General.      INDICATIONS:  This 59-year-old female presented with about a 36-48 hour history of abdominal pain.  She was about 16 hours away and elected to drive home for surgery.  Prior to the procedure, she was counseled about the risks, benefits and alternatives and she agreed to proceed.  All of her questions were answered.      DESCRIPTION OF PROCEDURE:  The patient was identified and brought to the operating room and placed on the table in the supine position.  After induction of adequate general endotracheal anesthesia, the patient's abdomen was prepped and draped in the usual sterile fashion.  A surgical timeout was performed at this point to identify both the patient and the proposed procedure.  All present were in agreement.      A small midline incision was made about 5 cm above the umbilicus.  Dissection was carried down through subcutaneous tissues bluntly to the level of the fascia.  The fascia was incised in the midline and two stay sutures of 0 Vicryl were placed.  The fascia was further elevated and the peritoneal cavity bluntly entered.  The Barry trocar was placed and the abdomen insufflated to 15 mmHg pressure with CO2 gas.  Two further 5 mm ports were placed, 1 in the left lower quadrant and 1 in the lower midline.  The appendix was easily identified as it was pointing straight anterior.  It was very thick-walled and the mesoappendix was very thick and difficult to grasp.  I worked my way through the mesoappendix carefully using the LigaSure device.  Bleeders were cauterized as they were encountered.  Eventually I was able to mobilize the appendix all the way to the base at the cecum.  There was some induration of the appendix at the cecum, but it felt reasonable to place a  staple line here.  The 10 mm camera was exchanged for 5 mm scope.  The Endo-TAYLER stapler was then placed and fired across the base of the appendix.  The staple line looked to be intact and there was no bleeding.  The appendix was placed into an Endobag and retrieved through the supraumbilical trocar site.  The Barry was replaced and the abdomen reinsufflated.  The right flank suprahepatic space and the pelvis were irrigated and came back as clear.  There was no bleeding.  There was never evidence of perforation.  The abdomen was deflated.  All ports were removed under direct vision.  No bleeding was seen.  The supraumbilical fascia was closed with a couple of interrupted 0 Vicryl figure-of-eight sutures.  All the wounds were infiltrated with Marcaine with epinephrine and closed with subcuticular 4-0 Monocryl suture.  Exofin was used as the dressing.  The patient was then awakened from her anesthetic and taken to the recovery room awake and in stable condition having tolerated the procedure well.  There were no complications.  Needle, sponge, and instrument counts were correct x 2.         DALIA RHODES MD             D: 2019   T: 2019   MT: JERMAN      Name:     ANGÉLICA QUINTERO   MRN:      [id number]        Account:        [id number]   :      1960           Procedure Date: 2019      Document: [id number]

## 2019-08-10 NOTE — CONSULTS
PCP:  Carley Kern    Chief complaint: Right lower quadrant pain, CT diagnosis of acute appendicitis    History of Present Illness: The patient is a healthy 59-year-old female who actually works as a physician's assistant.  She was fishing in [place] and developed abdominal pain about 48 hours ago.  She did not think it was too serious until yesterday when it was localized to the right lower quadrant.  She had to drive 6 hours to find a hospital.  She stopped in a small town and received some IV fluids as well as some pain medication and a single dose of antibiotics.  They did not have surgical capabilities so the patient elected to continue on home which is in [place].  She had some nausea but no vomiting.  No changes in her bowel habits.  Low-grade temp was noted.  Her white count was elevated.  A CT scan showed a very thickened appendix and some appendicoliths.  There was no evidence of perforation.  Free air or fluid.    Her surgical history includes an ovarian cyst as a teenager.  Histories:  Past Medical History:   Diagnosis Date     Basal cell carcinoma      Cervical high risk HPV (human papillomavirus) test positive 07/28/2017    Not 16/18     Cervicalgia     , MRI 10/05 c6-c7 sever left foraminal stenosis, s/p epidural injection 10/20/05     Degeneration of lumbar or lumbosacral intervertebral disc     degen disc lumbar      Other and unspecified malignant neoplasm of skin of other and unspecified parts of face 1994    Basal cell cancer right nose     Other and unspecified ovarian cyst 1978    ovarian cyst -Mare thought that her right ovary was removed, but in fact in retrospect it appears that the left has been removed.     S/P lumpectomy of breast 10/22/2001    excision left breast       Past Surgical History:   Procedure Laterality Date     COLONOSCOPY  7/26/2012    Procedure: COLONOSCOPY;  Colonoscopy;  Surgeon: Norberto Rivera MD;  Location: [facility]     SURGICAL HISTORY OF -   1978    Ovarian  cyst laparotomy      SURGICAL HISTORY OF -   1999    Discectomy lumbar     SURGICAL HISTORY OF -   8/30/1994    Mohs' excision basal cell carcinoma right nasal root       Family History   Problem Relation Age of Onset     Lipids Mother      Arrhythmia Mother      Neurologic Disorder Father         Parkinson's     Hypertension Maternal Grandmother      Heart Disease Maternal Grandfather      Cancer Paternal Grandfather         G.I.     Thyroid Disease Sister         Cancer     Cancer Sister         thyroid       Social History     Tobacco Use     Smoking status: Never Smoker     Smokeless tobacco: Never Used   Substance Use Topics     Alcohol use: Yes     Comment: occ       Current Outpatient Medications   Medication Sig Dispense Refill     HYDROcodone-acetaminophen (NORCO) 5-325 MG tablet Take 1-2 tablets by mouth every 4 hours as needed for pain or moderate to severe pain 15 tablet 0       Allergies   Allergen Reactions     Latex Rash     Topical rash from gloves     Sulfa Drugs Hives       Images:  Recent Results (from the past 744 hour(s))   CT Abdomen Pelvis w Contrast    Narrative    CT ABDOMEN AND PELVIS WITH CONTRAST 8/9/2019 4:54 PM    HISTORY: Right lower quadrant pain and fever.    TECHNIQUE: Helical axial scans from dome of liver through pubic  symphysis with 80 mL Isovue 370 IV contrast. Radiation dose for this  scan was reduced using automated exposure control, adjustment of the  mA and/or kV according to patient size, or iterative reconstruction  technique.    COMPARISON: None.    FINDINGS: Moderate distention of the gallbladder. No pericholecystic  inflammatory changes or CT evidence for gallbladder wall thickening.  The liver is otherwise unremarkable. The spleen, pancreas, bilateral  adrenal glands and kidneys bilaterally appear normal. The bowel and  mesentery in the upper abdomen are unremarkable.    Scans through the pelvis show a markedly dilated appendix measuring up  to 1.9 cm. There is a 1 cm  calcification at the base of the appendix  as well as a few other tiny calcifications within the appendiceal  lumen consistent with appendicoliths. Surrounding inflammatory  induration is present and the findings are consistent with acute  appendicitis. No evidence for abscess or perforation. Trace amount of  free fluid in the cul-de-sac.      Impression    IMPRESSION:  1. Acute uncomplicated appendicitis with appendicoliths.  2. Distended gallbladder.  3. Trace amount of free fluid in the cul-de-sac.       Labs:  Results for orders placed or performed during the hospital encounter of 08/09/19   CT Abdomen Pelvis w Contrast    Narrative    CT ABDOMEN AND PELVIS WITH CONTRAST 8/9/2019 4:54 PM    HISTORY: Right lower quadrant pain and fever.    TECHNIQUE: Helical axial scans from dome of liver through pubic  symphysis with 80 mL Isovue 370 IV contrast. Radiation dose for this  scan was reduced using automated exposure control, adjustment of the  mA and/or kV according to patient size, or iterative reconstruction  technique.    COMPARISON: None.    FINDINGS: Moderate distention of the gallbladder. No pericholecystic  inflammatory changes or CT evidence for gallbladder wall thickening.  The liver is otherwise unremarkable. The spleen, pancreas, bilateral  adrenal glands and kidneys bilaterally appear normal. The bowel and  mesentery in the upper abdomen are unremarkable.    Scans through the pelvis show a markedly dilated appendix measuring up  to 1.9 cm. There is a 1 cm calcification at the base of the appendix  as well as a few other tiny calcifications within the appendiceal  lumen consistent with appendicoliths. Surrounding inflammatory  induration is present and the findings are consistent with acute  appendicitis. No evidence for abscess or perforation. Trace amount of  free fluid in the cul-de-sac.      Impression    IMPRESSION:  1. Acute uncomplicated appendicitis with appendicoliths.  2. Distended  gallbladder.  3. Trace amount of free fluid in the cul-de-sac.   CBC with platelets differential   Result Value Ref Range    WBC 14.3 (H) 4.0 - 11.0 10e9/L    RBC Count 3.81 3.8 - 5.2 10e12/L    Hemoglobin 11.7 11.7 - 15.7 g/dL    Hematocrit 37.3 35.0 - 47.0 %    MCV 98 78 - 100 fl    MCH 30.7 26.5 - 33.0 pg    MCHC 31.4 (L) 31.5 - 36.5 g/dL    RDW 13.2 10.0 - 15.0 %    Platelet Count 247 150 - 450 10e9/L    Diff Method Automated Method     % Neutrophils 84.4 %    % Lymphocytes 7.4 %    % Monocytes 7.4 %    % Eosinophils 0.0 %    % Basophils 0.2 %    % Immature Granulocytes 0.6 %    Nucleated RBCs 0 0 /100    Absolute Neutrophil 12.1 (H) 1.6 - 8.3 10e9/L    Absolute Lymphocytes 1.1 0.8 - 5.3 10e9/L    Absolute Monocytes 1.1 0.0 - 1.3 10e9/L    Absolute Eosinophils 0.0 0.0 - 0.7 10e9/L    Absolute Basophils 0.0 0.0 - 0.2 10e9/L    Abs Immature Granulocytes 0.1 0 - 0.4 10e9/L    Absolute Nucleated RBC 0.0    Comprehensive metabolic panel   Result Value Ref Range    Sodium 140 133 - 144 mmol/L    Potassium 3.6 3.4 - 5.3 mmol/L    Chloride 108 94 - 109 mmol/L    Carbon Dioxide 26 20 - 32 mmol/L    Anion Gap 6 3 - 14 mmol/L    Glucose 99 70 - 99 mg/dL    Urea Nitrogen 8 7 - 30 mg/dL    Creatinine 0.72 0.52 - 1.04 mg/dL    GFR Estimate >90 >60 mL/min/[1.73_m2]    GFR Estimate If Black >90 >60 mL/min/[1.73_m2]    Calcium 8.6 8.5 - 10.1 mg/dL    Bilirubin Total 1.5 (H) 0.2 - 1.3 mg/dL    Albumin 3.5 3.4 - 5.0 g/dL    Protein Total 7.6 6.8 - 8.8 g/dL    Alkaline Phosphatase 72 40 - 150 U/L    ALT 84 (H) 0 - 50 U/L    AST 96 (H) 0 - 45 U/L       ROS:  Constitutional - Denies weight loss, malaise, lethargy  Neuro - Denies tremors or seizures  Pulmon - Denies SOB, dyspnea, hemoptysis, chronic cough or use of an inhaler  CV - Denies CP, SOB, lower extremity edema, difficulty w/ stairs, has never used NTG  GI - Denies hematemesis, BRBPR, melena, chronic diarrhea   - Denies hematuria, difficulty voiding, h/o STDs  Hematology -  Denies blood clotting disorders, chronic anemias  Rheumatology - No h/o RA  Pysch - Denies depression, bipolar d/o or schizophrenia    BP 98/49   Pulse 75   Temp 100  F (37.8  C)   Resp 20   Wt 74.8 kg (165 lb)   LMP 10/18/2012   SpO2 93%   BMI 26.83 kg/m      Exam:  General - Alert and Oriented X4, NAD, well nourished  HEENT - Normocephalic, atraumatic  Neck -visually normal  Lungs -chest wall excursion normal, respirations unlabored  CV - Heart RRR pulses regular on the monitor  Abdomen - Soft, exquisitely tender in the right lower quadrant.  No generalized peritoneal signs.  No masses.    Groins -deferred  Rectal -deferred   neuro -grossly normal   extremities - No cyanosis, clubbing or edema.      Assessment and Plan: Acute appendicitis based on history, exam, and CT scan.  Plan is for laparoscopic appendectomy tonight.  The procedure, risks, benefits, and alternatives were discussed with her in detail.  We will plan to do this this evening.  This dictation is actually being done following surgery just due to time constraints.    Consent was obtained for laparoscopic appendectomy.  This will be done as an outpatient.        Paul Rodriguez MD FACS

## 2019-08-14 LAB — COPATH REPORT: NORMAL

## 2019-08-26 ENCOUNTER — OFFICE VISIT (OUTPATIENT)
Dept: SURGERY | Facility: CLINIC | Age: 59
End: 2019-08-26
Payer: COMMERCIAL

## 2019-08-26 VITALS
SYSTOLIC BLOOD PRESSURE: 140 MMHG | DIASTOLIC BLOOD PRESSURE: 78 MMHG | WEIGHT: 155 LBS | BODY MASS INDEX: 24.91 KG/M2 | HEIGHT: 66 IN | HEART RATE: 68 BPM | TEMPERATURE: 98.6 F

## 2019-08-26 DIAGNOSIS — Z98.890 POSTOPERATIVE STATE: Primary | ICD-10-CM

## 2019-08-26 PROCEDURE — 99024 POSTOP FOLLOW-UP VISIT: CPT | Performed by: SURGERY

## 2019-08-26 RX ORDER — METAXALONE 800 MG/1
800 TABLET ORAL 3 TIMES DAILY
COMMUNITY
End: 2020-10-21

## 2019-08-26 ASSESSMENT — MIFFLIN-ST. JEOR: SCORE: 1295.86

## 2019-08-26 NOTE — PROGRESS NOTES
Mare is 2 weeks status post laparoscopic appendectomy.  She comes in today for a postop check.    She reports no problems in terms of her abdomen or her incisions.  She is feeling good has no pain.    On exam: Her wounds are healing fine.  No sign of infection or hernia formation.    He does report that she is been having pain in her shoulder that radiates down her arm since surgery.  She is not exactly sure when it started but she is having some what sounds like ulnar neuropathy.  She has an appointment with the spine specialist to have a look at this.    I reviewed the CT scan with her.  I reviewed the photographs with her.  I reviewed the pathology report with her.    Impression: Satisfactory postoperative course.  She has no issues that I am aware of.  I do not know how the shoulder pain related to her appendectomy since she is feeling quite well.    Recommendation: Return to usual activities as tolerated, and I will see her back as needed.    Paul Rodriguez MD FACS

## 2019-08-26 NOTE — LETTER
8/26/2019         RE: Mare Dominguez  [address] [facility] [address]        Dear Colleague,    Thank you for referring your patient, Mare Dominguez, to the [facility]. Please see a copy of my visit note below.    Mare is 2 weeks status post laparoscopic appendectomy.  She comes in today for a postop check.    She reports no problems in terms of her abdomen or her incisions.  She is feeling good has no pain.    On exam: Her wounds are healing fine.  No sign of infection or hernia formation.    He does report that she is been having pain in her shoulder that radiates down her arm since surgery.  She is not exactly sure when it started but she is having some what sounds like ulnar neuropathy.  She has an appointment with the spine specialist to have a look at this.    I reviewed the CT scan with her.  I reviewed the photographs with her.  I reviewed the pathology report with her.    Impression: Satisfactory postoperative course.  She has no issues that I am aware of.  I do not know how the shoulder pain related to her appendectomy since she is feeling quite well.    Recommendation: Return to usual activities as tolerated, and I will see her back as needed.    Paul Rodriguez MD FACS    Again, thank you for allowing me to participate in the care of your patient.        Sincerely,        Paul Rodriguez MD

## 2019-08-26 NOTE — NURSING NOTE
"Initial BP (!) 140/78 (BP Location: Right arm, Patient Position: Sitting, Cuff Size: Adult Regular)   Pulse 68   Temp 98.6  F (37  C) (Oral)   Ht 1.67 m (5' 5.75\")   Wt 70.3 kg (155 lb)   LMP 10/18/2012   BMI 25.21 kg/m   Estimated body mass index is 25.21 kg/m  as calculated from the following:    Height as of this encounter: 1.67 m (5' 5.75\").    Weight as of this encounter: 70.3 kg (155 lb). .    Katie De Dios CMA    "

## 2019-08-27 ENCOUNTER — TRANSFERRED RECORDS (OUTPATIENT)
Dept: HEALTH INFORMATION MANAGEMENT | Facility: CLINIC | Age: 59
End: 2019-08-27

## 2020-03-05 ENCOUNTER — TRANSFERRED RECORDS (OUTPATIENT)
Dept: HEALTH INFORMATION MANAGEMENT | Facility: CLINIC | Age: 60
End: 2020-03-05

## 2020-03-23 ENCOUNTER — MYC MEDICAL ADVICE (OUTPATIENT)
Dept: FAMILY MEDICINE | Facility: CLINIC | Age: 60
End: 2020-03-23

## 2020-03-23 ENCOUNTER — E-VISIT (OUTPATIENT)
Dept: FAMILY MEDICINE | Facility: CLINIC | Age: 60
End: 2020-03-23
Payer: COMMERCIAL

## 2020-03-23 DIAGNOSIS — N61.0 MASTITIS: Primary | ICD-10-CM

## 2020-03-23 PROCEDURE — 99421 OL DIG E/M SVC 5-10 MIN: CPT | Performed by: FAMILY MEDICINE

## 2020-03-23 NOTE — TELEPHONE ENCOUNTER
Provider E-Visit time total (minutes): 10    This patient was seen virtually during the COVID-19 outbreak in attempts to keep healthy patients out of the clinic per CDC guidelines (practicing social distancing).  I evaluated them by phone/evisit and the note reflects my medical decision making.      Carley Kern M.D.

## 2020-03-25 ENCOUNTER — HOSPITAL ENCOUNTER (OUTPATIENT)
Dept: MAMMOGRAPHY | Facility: CLINIC | Age: 60
End: 2020-03-25
Attending: FAMILY MEDICINE
Payer: COMMERCIAL

## 2020-03-25 ENCOUNTER — MYC MEDICAL ADVICE (OUTPATIENT)
Dept: FAMILY MEDICINE | Facility: CLINIC | Age: 60
End: 2020-03-25

## 2020-03-25 DIAGNOSIS — N61.0 MASTITIS, RIGHT, ACUTE: Primary | ICD-10-CM

## 2020-03-25 DIAGNOSIS — N61.0 MASTITIS, RIGHT, ACUTE: ICD-10-CM

## 2020-03-25 LAB
GRAM STN SPEC: ABNORMAL
GRAM STN SPEC: ABNORMAL
Lab: ABNORMAL
SPECIMEN SOURCE: ABNORMAL

## 2020-03-25 PROCEDURE — 87077 CULTURE AEROBIC IDENTIFY: CPT | Performed by: FAMILY MEDICINE

## 2020-03-25 PROCEDURE — 87075 CULTR BACTERIA EXCEPT BLOOD: CPT | Performed by: FAMILY MEDICINE

## 2020-03-25 PROCEDURE — 76642 ULTRASOUND BREAST LIMITED: CPT | Mod: RT

## 2020-03-25 PROCEDURE — 87205 SMEAR GRAM STAIN: CPT | Performed by: FAMILY MEDICINE

## 2020-03-25 PROCEDURE — 87186 SC STD MICRODIL/AGAR DIL: CPT | Performed by: FAMILY MEDICINE

## 2020-03-25 PROCEDURE — 87070 CULTURE OTHR SPECIMN AEROBIC: CPT | Performed by: FAMILY MEDICINE

## 2020-03-25 PROCEDURE — 76942 ECHO GUIDE FOR BIOPSY: CPT

## 2020-03-25 PROCEDURE — 25000125 ZZHC RX 250: Performed by: FAMILY MEDICINE

## 2020-03-25 RX ADMIN — LIDOCAINE HYDROCHLORIDE 5 ML: 10 INJECTION, SOLUTION INFILTRATION; PERINEURAL at 14:03

## 2020-03-27 LAB
BACTERIA SPEC CULT: ABNORMAL
Lab: ABNORMAL
SPECIMEN SOURCE: ABNORMAL

## 2020-03-28 DIAGNOSIS — N61.1 BREAST ABSCESS: Primary | ICD-10-CM

## 2020-03-28 RX ORDER — CIPROFLOXACIN 500 MG/1
500 TABLET, FILM COATED ORAL 2 TIMES DAILY
Qty: 14 TABLET | Refills: 0 | Status: SHIPPED | OUTPATIENT
Start: 2020-03-28 | End: 2020-04-04

## 2020-03-28 NOTE — RESULT ENCOUNTER NOTE
LEFT MESSAGE for pt on mobile phone.   Culture is back - Pseudomonas.    Patient on Augmentin and was improved as of Thursday.  If symptoms progressive or not clearing, antibiotic would be changed, asked patient to contact me if this was the case, otherwise will discuss further Monday 3/30.    Carley Kern M.D.

## 2020-03-30 ENCOUNTER — MYC MEDICAL ADVICE (OUTPATIENT)
Dept: FAMILY MEDICINE | Facility: CLINIC | Age: 60
End: 2020-03-30

## 2020-04-01 LAB
BACTERIA SPEC CULT: NORMAL
Lab: NORMAL
SPECIMEN SOURCE: NORMAL

## 2020-04-09 ENCOUNTER — MYC MEDICAL ADVICE (OUTPATIENT)
Dept: FAMILY MEDICINE | Facility: CLINIC | Age: 60
End: 2020-04-09

## 2020-04-09 ENCOUNTER — E-VISIT (OUTPATIENT)
Dept: FAMILY MEDICINE | Facility: CLINIC | Age: 60
End: 2020-04-09
Payer: COMMERCIAL

## 2020-04-09 DIAGNOSIS — B37.31 YEAST INFECTION OF THE VAGINA: Primary | ICD-10-CM

## 2020-04-09 PROCEDURE — 99421 OL DIG E/M SVC 5-10 MIN: CPT | Performed by: FAMILY MEDICINE

## 2020-04-09 RX ORDER — FLUCONAZOLE 150 MG/1
150 TABLET ORAL ONCE
Qty: 1 TABLET | Refills: 0 | Status: SHIPPED | OUTPATIENT
Start: 2020-04-09 | End: 2020-04-09

## 2020-04-09 NOTE — TELEPHONE ENCOUNTER
Provider E-Visit time total (minutes): 8 minutes    This patient was seen virtually during the COVID-19 outbreak in attempts to keep healthy patients out of the clinic per CDC guidelines (practicing social distancing).  I evaluated them by phone/evisit and the note reflects our conversation/visit.      Carley Kern M.D.

## 2020-05-29 ENCOUNTER — HOSPITAL ENCOUNTER (OUTPATIENT)
Dept: MAMMOGRAPHY | Facility: CLINIC | Age: 60
Discharge: HOME | End: 2020-05-29
Attending: FAMILY MEDICINE | Admitting: FAMILY MEDICINE
Payer: COMMERCIAL

## 2020-05-29 DIAGNOSIS — Z12.31 VISIT FOR SCREENING MAMMOGRAM: ICD-10-CM

## 2020-05-29 PROCEDURE — 77063 BREAST TOMOSYNTHESIS BI: CPT

## 2020-10-21 ENCOUNTER — OFFICE VISIT (OUTPATIENT)
Dept: FAMILY MEDICINE | Facility: CLINIC | Age: 60
End: 2020-10-21
Payer: COMMERCIAL

## 2020-10-21 VITALS
RESPIRATION RATE: 18 BRPM | BODY MASS INDEX: 27.36 KG/M2 | HEIGHT: 65 IN | OXYGEN SATURATION: 100 % | HEART RATE: 72 BPM | SYSTOLIC BLOOD PRESSURE: 138 MMHG | TEMPERATURE: 98 F | DIASTOLIC BLOOD PRESSURE: 82 MMHG | WEIGHT: 164.2 LBS

## 2020-10-21 DIAGNOSIS — Z00.00 ROUTINE GENERAL MEDICAL EXAMINATION AT A HEALTH CARE FACILITY: Primary | ICD-10-CM

## 2020-10-21 DIAGNOSIS — R25.2 LEG CRAMPS: ICD-10-CM

## 2020-10-21 DIAGNOSIS — Z23 NEED FOR VACCINATION: ICD-10-CM

## 2020-10-21 DIAGNOSIS — L29.0 ANAL ITCHING: ICD-10-CM

## 2020-10-21 DIAGNOSIS — R63.5 WEIGHT GAIN: ICD-10-CM

## 2020-10-21 LAB
ALBUMIN SERPL-MCNC: 4 G/DL (ref 3.4–5)
ALP SERPL-CCNC: 48 U/L (ref 40–150)
ALT SERPL W P-5'-P-CCNC: 21 U/L (ref 0–50)
ANION GAP SERPL CALCULATED.3IONS-SCNC: 7 MMOL/L (ref 3–14)
AST SERPL W P-5'-P-CCNC: 22 U/L (ref 0–45)
BILIRUB SERPL-MCNC: 0.8 MG/DL (ref 0.2–1.3)
BUN SERPL-MCNC: 13 MG/DL (ref 7–30)
CALCIUM SERPL-MCNC: 9.1 MG/DL (ref 8.5–10.1)
CHLORIDE SERPL-SCNC: 100 MMOL/L (ref 94–109)
CO2 SERPL-SCNC: 28 MMOL/L (ref 20–32)
CREAT SERPL-MCNC: 0.8 MG/DL (ref 0.52–1.04)
GFR SERPL CREATININE-BSD FRML MDRD: 80 ML/MIN/{1.73_M2}
GLUCOSE SERPL-MCNC: 92 MG/DL (ref 70–99)
MAGNESIUM SERPL-MCNC: 2.3 MG/DL (ref 1.6–2.3)
POTASSIUM SERPL-SCNC: 4.2 MMOL/L (ref 3.4–5.3)
PROT SERPL-MCNC: 7.5 G/DL (ref 6.8–8.8)
SODIUM SERPL-SCNC: 135 MMOL/L (ref 133–144)
TSH SERPL DL<=0.005 MIU/L-ACNC: 2.16 MU/L (ref 0.4–4)

## 2020-10-21 PROCEDURE — 36415 COLL VENOUS BLD VENIPUNCTURE: CPT | Performed by: FAMILY MEDICINE

## 2020-10-21 PROCEDURE — 99396 PREV VISIT EST AGE 40-64: CPT | Performed by: FAMILY MEDICINE

## 2020-10-21 PROCEDURE — 90750 HZV VACC RECOMBINANT IM: CPT | Performed by: FAMILY MEDICINE

## 2020-10-21 PROCEDURE — 80053 COMPREHEN METABOLIC PANEL: CPT | Performed by: FAMILY MEDICINE

## 2020-10-21 PROCEDURE — 84443 ASSAY THYROID STIM HORMONE: CPT | Performed by: FAMILY MEDICINE

## 2020-10-21 PROCEDURE — 90471 IMMUNIZATION ADMIN: CPT | Performed by: FAMILY MEDICINE

## 2020-10-21 PROCEDURE — 83735 ASSAY OF MAGNESIUM: CPT | Performed by: FAMILY MEDICINE

## 2020-10-21 RX ORDER — HYDROCORTISONE 25 MG/G
CREAM TOPICAL 2 TIMES DAILY PRN
Qty: 30 G | Refills: 1 | Status: ON HOLD | OUTPATIENT
Start: 2020-10-21 | End: 2021-09-15

## 2020-10-21 RX ORDER — MULTIVITAMIN WITH IRON
1 TABLET ORAL EVERY EVENING
COMMUNITY

## 2020-10-21 ASSESSMENT — MIFFLIN-ST. JEOR: SCORE: 1324.65

## 2020-10-21 NOTE — RESULT ENCOUNTER NOTE
Mare,    All of the labs were normal or acceptable.    Please contact my office if you have questions.    Carley Kern M.D.

## 2020-10-21 NOTE — PROGRESS NOTES
SUBJECTIVE:   CC: Mare Dominguez is an 60 year old woman who presents for preventive health visit.       Patient has been advised of split billing requirements and indicates understanding: Yes  Healthy Habits:    Do you get at least three servings of calcium containing foods daily (dairy, green leafy vegetables, etc.)? yes    Amount of exercise or daily activities, outside of work: 1 hour(s) per day    Problems taking medications regularly not applicable    Medication side effects: No    Have you had an eye exam in the past two years? yes    Do you see a dentist twice per year? no    Do you have sleep apnea, excessive snoring or daytime drowsiness?no          Today's PHQ-2 Score:   PHQ-2 ( 1999 Pfizer) 10/21/2020 10/5/2018   Q1: Little interest or pleasure in doing things 0 0   Q2: Feeling down, depressed or hopeless 0 0   PHQ-2 Score 0 0   Q1: Little interest or pleasure in doing things - Not at all   Q2: Feeling down, depressed or hopeless - Not at all   PHQ-2 Score - 0       Abuse: Current or Past(Physical, Sexual or Emotional)- No  Do you feel safe in your environment? Yes    Have you ever done Advance Care Planning? (For example, a Health Directive, POLST, or a discussion with a medical provider or your loved ones about your wishes): No, advance care planning information given to patient to review.  Advanced care planning was discussed at today's visit.    Social History     Tobacco Use     Smoking status: Never Smoker     Smokeless tobacco: Never Used   Substance Use Topics     Alcohol use: Yes     Comment: occ     If you drink alcohol do you typically have >3 drinks per day or >7 drinks per week? Yes - AUDIT SCORE:     AUDIT - Alcohol Use Disorders Identification Test - Reproduced from the World Health Organization Audit 2001 (Second Edition) 10/21/2020   1.  How often do you have a drink containing alcohol? 4 or more times a week   2.  How many drinks containing alcohol do you have on a typical day  when you are drinking? 1 or 2   3.  How often do you have five or more drinks on one occasion? Monthly   4.  How often during the last year have you found that you were not able to stop drinking once you had started? Never   5.  How often during the last year have you failed to do what was normally expected of you because of drinking? Never   6.  How often during the last year have you needed a first drink in the morning to get yourself going after a heavy drinking session? Never   7.  How often during the last year have you had a feeling of guilt or remorse after drinking? Never   8.  How often during the last year have you been unable to remember what happened the night before because of your drinking? Never   9.  Have you or someone else been injured because of your drinking? No   10. Has a relative, friend, doctor or other health care worker been concerned about your drinking or suggested you cut down? No   TOTAL SCORE 6                        Reviewed orders with patient.  Reviewed health maintenance and updated orders accordingly - Yes  Lab work is in process    Mammogram Screening: Patient over age 50, mutual decision to screen reflected in health maintenance.    Pertinent mammograms are reviewed under the imaging tab.  History of abnormal Pap smear: NO - age 30-65 PAP every 5 years with negative HPV co-testing recommended  PAP / HPV Latest Ref Rng & Units 10/5/2018 7/28/2017 11/21/2014   PAP - NIL NIL NIL   HPV 16 DNA NEG:Negative Negative Negative -   HPV 18 DNA NEG:Negative Negative Negative -   OTHER HR HPV NEG:Negative Negative Positive(A) -     Reviewed and updated as needed this visit by clinical staff  Tobacco  Allergies  Meds   Med Hx  Surg Hx  Fam Hx  Soc Hx        Reviewed and updated as needed this visit by Provider                    ROS:  CONSTITUTIONAL: NEGATIVE for fever, chills, change in weight  INTEGUMENTARY/SKIN: NEGATIVE for worrisome rashes, moles or lesions  EYES: NEGATIVE for  "vision changes or irritation  ENT: NEGATIVE for ear, mouth and throat problems  RESP: NEGATIVE for significant cough or SOB  BREAST: NEGATIVE for masses, tenderness or discharge  CV: NEGATIVE for chest pain, palpitations or peripheral edema  GI: NEGATIVE for nausea, abdominal pain, heartburn, or change in bowel habits  : NEGATIVE for unusual urinary or vaginal symptoms. No vaginal bleeding.  MUSCULOSKELETAL:cramps in feet/legs, thumb last week.   NEURO: NEGATIVE for weakness, dizziness or paresthesias  PSYCHIATRIC: NEGATIVE for changes in mood or affect     OBJECTIVE:   /82 (Cuff Size: Adult Regular)   Pulse 72   Temp 98  F (36.7  C) (Tympanic)   Resp 18   Ht 1.657 m (5' 5.25\")   Wt 74.5 kg (164 lb 3.2 oz)   LMP 10/18/2012   SpO2 100%   BMI 27.12 kg/m    EXAM:  GENERAL APPEARANCE: healthy, alert and no distress  EYES: Eyes grossly normal to inspection, PERRL and conjunctivae and sclerae normal  HENT: ear canals and TM's normal, nose and mouth without ulcers or lesions, oropharynx clear and oral mucous membranes moist  NECK: no adenopathy, no asymmetry, masses, or scars and thyroid normal to palpation  RESP: lungs clear to auscultation - no rales, rhonchi or wheezes  BREAST: normal without masses, tenderness or nipple discharge and no palpable axillary masses or adenopathy  CV: regular rate and rhythm, normal S1 S2, no S3 or S4, no murmur, click or rub, no peripheral edema and peripheral pulses strong  ABDOMEN: soft, nontender, no hepatosplenomegaly, no masses and bowel sounds normal  MS: no musculoskeletal defects are noted and gait is age appropriate without ataxia  SKIN: no suspicious lesions or rashes  NEURO: Normal strength and tone, sensory exam grossly normal, mentation intact and speech normal  PSYCH: mentation appears normal and affect normal/bright    Diagnostic Test Results:  Labs reviewed in Epic    ASSESSMENT/PLAN:   1. Routine general medical examination at a health care facility     - " "TSH with free T4 reflex  - Comprehensive metabolic panel    2. Weight gain     - TSH with free T4 reflex  - Comprehensive metabolic panel    3. Anal itching     - hydrocortisone, Perianal, (HYDROCORTISONE) 2.5 % cream; Place rectally 2 times daily as needed for hemorrhoids  Dispense: 30 g; Refill: 1    4. Leg cramps     - Magnesium    Patient has been advised of split billing requirements and indicates understanding: Yes  COUNSELING:   Reviewed preventive health counseling, as reflected in patient instructions       Regular exercise       Healthy diet/nutrition    Estimated body mass index is 27.12 kg/m  as calculated from the following:    Height as of this encounter: 1.657 m (5' 5.25\").    Weight as of this encounter: 74.5 kg (164 lb 3.2 oz).    Weight management plan: Discussed healthy diet and exercise guidelines    She reports that she has never smoked. She has never used smokeless tobacco.      Counseling Resources:  ATP IV Guidelines  Pooled Cohorts Equation Calculator  Breast Cancer Risk Calculator  BRCA-Related Cancer Risk Assessment: FHS-7 Tool  FRAX Risk Assessment  ICSI Preventive Guidelines  Dietary Guidelines for Americans, 2010  USDA's MyPlate  ASA Prophylaxis  Lung CA Screening    Carley Kern MD  [facility]  "

## 2020-10-29 ENCOUNTER — VIRTUAL VISIT (OUTPATIENT)
Dept: FAMILY MEDICINE | Facility: OTHER | Age: 60
End: 2020-10-29

## 2020-10-29 ENCOUNTER — NURSE TRIAGE (OUTPATIENT)
Dept: NURSING | Facility: CLINIC | Age: 60
End: 2020-10-29

## 2020-10-29 DIAGNOSIS — Z20.822 ENCOUNTER FOR LABORATORY TESTING FOR COVID-19 VIRUS: Primary | ICD-10-CM

## 2020-10-29 PROCEDURE — U0003 INFECTIOUS AGENT DETECTION BY NUCLEIC ACID (DNA OR RNA); SEVERE ACUTE RESPIRATORY SYNDROME CORONAVIRUS 2 (SARS-COV-2) (CORONAVIRUS DISEASE [COVID-19]), AMPLIFIED PROBE TECHNIQUE, MAKING USE OF HIGH THROUGHPUT TECHNOLOGIES AS DESCRIBED BY CMS-2020-01-R: HCPCS | Performed by: FAMILY MEDICINE

## 2020-10-29 NOTE — PROGRESS NOTES
"Date: 10/29/2020 09:32:09  Clinician: Jose Michelle  Clinician NPI: [id number]  Patient: Mare Dominguez  Patient : 1960  Patient Address: [address] [place] [address]  Patient Phone: [phone]  Visit Protocol: URI  Patient Summary:  Mare is a 60 year old ( : 1960 ) female who initiated a [facility] Visit for COVID-19 (Coronavirus) evaluation and screening. When asked the question \"Please sign me up to receive news, health information and promotions. \", Mare responded \"No\".    Mare states her symptoms started 1-2 days ago.   Her symptoms consist of a headache, nasal congestion, facial pain or pressure, myalgia, chills, malaise, a sore throat, and rhinitis. Mare also feels feverish.   Symptom details     Nasal secretions: The color of her mucus is clear.    Sore throat: Mare reports having mild throat pain (1-3 on a 10 point pain scale), does not have exudate on her tonsils, and can swallow liquids. The lymph nodes in her neck are not enlarged. A rash has not appeared on the skin since the sore throat started.     Temperature: Her current temperature is 99.0 degrees Fahrenheit.     Facial pain or pressure: The facial pain or pressure does not feel worse when bending or leaning forward.     Headache: She states the headache is moderate (4-6 on a 10 point pain scale).      Mare denies having ear pain, wheezing, enlarged lymph nodes, cough, nausea, teeth pain, ageusia, diarrhea, anosmia, and vomiting. She also denies having recent facial or sinus surgery in the past 60 days, having a sinus infection within the past year, and taking antibiotic medication in the past month. She is not experiencing dyspnea.   Precipitating events  Within the past week, Mare has not been exposed to someone with strep throat. She has not recently been exposed to someone with influenza. Mare has not been in close contact with any high risk individuals.   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 In the past 14 days, Mare has not worked or volunteered at a healthcare facility or group living setting.   In the past 14 days, she also has not lived in a congregate living setting.   Mare has had a close contact with a laboratory-confirmed COVID-19 patient within 14 days of symptom onset. She was exposed at her work. Date Mare was exposed to the laboratory-confirmed COVID-19 patient: 10/24/2020   Additional information about contact with COVID-19 (Coronavirus) patient as reported by the patient (free text): One exposure at work 14 days ago.  Recent exposure to family member on 10/24/2020 who was not feeling well and tested positive for Covid    Since December 2019, Mare has not been diagnosed with lab-confirmed COVID-19 test and has not had upper respiratory infection or influenza-like illness.   Pertinent medical history  Mare does not get yeast infections when she takes antibiotics.   Mare does not need a return to work/school note.   Weight: 160 lbs   Mare does not smoke or use smokeless tobacco.   Weight: 160 lbs    MEDICATIONS: ibuprofen oral, flaxseed oil, calcium-magnesium oral, glucosamine-chondroitin oral, ALLERGIES: Sulfa (Sulfonamide Antibiotics)  Clinician Response:  Dear Mare,   Your symptoms show that you may have coronavirus (COVID-19). This illness can cause fever, cough and trouble breathing. Many people get a mild case and get better on their own. Some people can get very sick.  What should I do?  We would like to test you for this virus.   1. Please call [phone] to schedule your visit. Explain that you were [facility] to have a COVID-19 test. Be ready to share your [facility] visit ID number.  The following will serve as your written order for this COVID Test, ordered by me, for the indication of suspected COVID [Z20.828]: The test will be ordered in [employer], our electronic health record, after  "you are scheduled. It will show as ordered and authorized by Robert Morales MD.  Order: COVID-19 (Coronavirus) PCR for SYMPTOMATIC [facility] from [facility].   2. When it's time for your COVID test:  Stay at least 6 feet away from others. (If someone will drive you to your test, stay in the backseat, as far away from the  as you can.)   Cover your mouth and nose with a mask, tissue or washcloth.  Go straight to the testing site. Don't make any stops on the way there or back.      3.Starting now: Stay home and away from others (self-isolate) until:   You've had no fever---and no medicine that reduces fever---for one full day (24 hours). And...   Your other symptoms have gotten better. For example, your cough or breathing has improved. And...   At least 10 days have passed since your symptoms started.       During this time, don't leave the house except for testing or medical care.   Stay in your own room, even for meals. Use your own bathroom if you can.   Stay away from others in your home. No hugging, kissing or shaking hands. No visitors.  Don't go to work, school or anywhere else.    Clean \"high touch\" surfaces often (doorknobs, counters, handles, etc.). Use a household cleaning spray or wipes. You'll find a full list of  on the EPA website: www.epa.gov/pesticide-registration/list-n-disinfectants-use-against-sars-cov-2.   Cover your mouth and nose with a mask, tissue or washcloth to avoid spreading germs.  Wash your hands and face often. Use soap and water.  Caregivers in these groups are at risk for severe illness due to COVID-19:  o People 65 years and older  o People who live in a nursing home or long-term care facility  o People with chronic disease (lung, heart, cancer, diabetes, kidney, liver, immunologic)  o People who have a weakened immune system, including those who:   Are in cancer treatment  Take medicine that weakens the immune system, such as corticosteroids  Had a bone marrow or organ " transplant  Have an immune deficiency  Have poorly controlled HIV or AIDS  Are obese (body mass index of 40 or higher)  Smoke regularly   o Caregivers should wear gloves while washing dishes, handling laundry and cleaning bedrooms and bathrooms.  o Use caution when washing and drying laundry: Don't shake dirty laundry, and use the warmest water setting that you can.  o For more tips, go to www.cdc.gov/coronavirus/2019-ncov/downloads/10Things.pdf.    How can I take care of myself?    Get lots of rest. Drink extra fluids (unless a doctor has told you not to).   Take Tylenol (acetaminophen) for fever or pain. If you have liver or kidney problems, ask your family doctor if it's okay to take Tylenol.   Adults can take either:    650 mg (two 325 mg pills) every 4 to 6 hours, or...   1,000 mg (two 500 mg pills) every 8 hours as needed.    Note: Don't take more than 3,000 mg in one day. Acetaminophen is found in many medicines (both prescribed and over-the-counter medicines). Read all labels to be sure you don't take too much.   For children, check the Tylenol bottle for the right dose. The dose is based on the child's age or weight.    If you have other health problems (like cancer, heart failure, an organ transplant or severe kidney disease): Call your specialty clinic if you don't feel better in the next 2 days.       Know when to call 911. Emergency warning signs include:    Trouble breathing or shortness of breath Pain or pressure in the chest that doesn't go away Feeling confused like you haven't felt before, or not being able to wake up Bluish-colored lips or face.  Where can I get more information?    [facility] -- About COVID-19: [email or web address]   CDC -- What to Do If You're Sick: www.cdc.gov/coronavirus/2019-ncov/about/steps-when-sick.html   CDC -- Ending Home Isolation: www.cdc.gov/coronavirus/2019-ncov/hcp/disposition-in-home-patients.html   CDC -- Caring for Someone:  www.cdc.gov/coronavirus/2019-ncov/if-you-are-sick/care-for-someone.html   [facility] -- Interim Guidance for Hospital Discharge to Home: www.health.[facility].[place]/diseases/coronavirus/hcp/hospdischarge.pdf   [facility] clinical trials (COVID-19 research studies): clinicalaffairs.[facility]-clinical-trials    Below are the COVID-19 hotlines at the [place] Department of Health ([facility]). Interpreters are available.    For health questions: Call 198-518-2005 or 1-989.255.1030 (7 a.m. to 7 p.m.) For questions about schools and childcare: Call 657-287-3580 or 1-382.722.4339 (7 a.m. to 7 p.m.)    Diagnosis: Contact with and (suspected) exposure to other viral communicable diseases  Diagnosis ICD: Z20.828

## 2020-10-29 NOTE — TELEPHONE ENCOUNTER
Called to request for an order to be tested today for COVID-19 infection along with her daughter.  States that her daughter has an appointment to be tested today for coronavirus.  Emilie Billings RN    Reason for Disposition    [1] Caller requesting NON-URGENT health information AND [2] PCP's office is the best resource     Requested for an order for COVID-19 infection testing    Additional Information    Negative: [1] Caller is not with the adult (patient) AND [2] reporting urgent symptoms    Negative: Lab result questions    Negative: Medication questions    Negative: Caller can't be reached by phone    Negative: Caller has already spoken to PCP or another triager    Negative: RN needs further essential information from caller in order to complete triage    Negative: Requesting regular office appointment    Protocols used: INFORMATION ONLY CALL-A-AH

## 2020-10-30 ENCOUNTER — MYC MEDICAL ADVICE (OUTPATIENT)
Dept: FAMILY MEDICINE | Facility: CLINIC | Age: 60
End: 2020-10-30

## 2020-10-30 LAB
SARS-COV-2 RNA SPEC QL NAA+PROBE: ABNORMAL
SPECIMEN SOURCE: ABNORMAL

## 2020-10-31 ENCOUNTER — TELEPHONE (OUTPATIENT)
Dept: LAB | Facility: CLINIC | Age: 60
End: 2020-10-31

## 2020-10-31 NOTE — TELEPHONE ENCOUNTER
"Coronavirus (COVID-19) Notification    Caller Name (Patient, parent, daughter/son, grandparent, etc)  Mare    Reason for call  Notify of Positive Coronavirus (COVID-19) lab results, assess symptoms,  review [facility] recommendations    Lab Result    Lab test:  2019-nCoV rRt-PCR or SARS-CoV-2 PCR    Oropharyngeal AND/OR nasopharyngeal swabs is POSITIVE for 2019-nCoV RNA/SARS-COV-2 PCR (COVID-19 virus)    RN Recommendations/Instructions per [facility] Coronavirus COVID-19 recommendations    Brief introduction script  Introduce self then review script:  \"I am calling on behalf of [employer].  We were notified that your Coronavirus test (COVID-19) for was POSITIVE for the virus.  I have some information to relay to you but first I wanted to mention that the [place] Dept of Health will be contacting you shortly [it's possible MD already called Patient] to talk to you more about how you are feeling and other people you have had contact with who might now also have the virus.  Also, [facility] is Partnering with the [facility] for Covid-19 research, you may be contacted directly by research staff.\"    Assessment (Inquire about Patient's current symptoms)   Assessment   Current Symptoms at time of phone call: (if no symptoms, document No symptoms] Body aches, no fevers, no cough or sob   Symptoms onset (if applicable) 9/28     If at time of call, Patients symptoms hare worsened, the Patient should contact 911 or have someone drive them to Emergency Dept promptly:      If Patient calling 911, inform 911 personal that you have tested positive for the Coronavirus (COVID-19).  Place mask on and await 911 to arrive.    If Emergency Dept, If possible, please have another adult drive you to the Emergency Dept but you need to wear mask when in contact with other people.      Review information with Patient    Your result was positive. This means you have COVID-19 (coronavirus).  We have sent you a " letter that reviews the information that I'll be reviewing with you now.    How can I protect others?    If you have symptoms: stay home and away from others (self-isolate) until:    You've had no fever--and no medicine that reduces fever--for 1 full day (24 hours). And       Your other symptoms have gotten better. For example, your cough or breathing has improved. And     At least 10 days have passed since your symptoms started. (If you've been told by a doctor that you have a weak immune system, wait 20 days.)     If you don't have symptoms: Stay home and away from others (self-isolate) until at least 10 days have passed since your first positive COVID-19 test. (Date test collected)    During this time:    Stay in your own room, including for meals. Use your own bathroom if you can.    Stay away from others in your home. No hugging, kissing or shaking hands. No visitors.     Don't go to work, school or anywhere else.     Clean  high touch  surfaces often (doorknobs, counters, handles, etc.). Use a household cleaning spray or wipes. You'll find a full list on the EPA website at www.epa.gov/pesticide-registration/list-n-disinfectants-use-against-sars-cov-2.     Cover your mouth and nose with a mask, tissue or other face covering to avoid spreading germs.    Wash your hands and face often with soap and water.    Caregivers in these groups are at risk for severe illness due to COVID-19:  o People 65 years and older  o People who live in a nursing home or long-term care facility  o People with chronic disease (lung, heart, cancer, diabetes, kidney, liver, immunologic)  o People who have a weakened immune system, including those who:  - Are in cancer treatment  - Take medicine that weakens the immune system, such as corticosteroids  - Had a bone marrow or organ transplant  - Have an immune deficiency  - Have poorly controlled HIV or AIDS  - Are obese (body mass index of 40 or higher)  - Smoke regularly    Caregivers  should wear gloves while washing dishes, handling laundry and cleaning bedrooms and bathrooms.    Wash and dry laundry with special caution. Don't shake dirty laundry, and use the warmest water setting you can.    If you have a weakened immune system, ask your doctor about other actions you should take.    For more tips, go to www.cdc.gov/coronavirus/2019-ncov/downloads/10Things.pdf.    You should not go back to work until you meet the guidelines above for ending your home isolation. You don't need to be retested for COVID-19 before going back to work--studies show that you won't spread the virus if it's been at least 10 days since your symptoms started (or 20 days, if you have a weak immune system).    Employers: This document serves as formal notice of your employee's medical guidelines for going back to work. They must meet the above guidelines before going back to work in person.    How can I take care of myself?    1. Get lots of rest. Drink extra fluids (unless a doctor has told you not to).    2. Take Tylenol (acetaminophen) for fever or pain. If you have liver or kidney problems, ask your family doctor if it's okay to take Tylenol.     Take either:     650 mg (two 325 mg pills) every 4 to 6 hours, or     1,000 mg (two 500 mg pills) every 8 hours as needed.     Note: Don't take more than 3,000 mg in one day. Acetaminophen is found in many medicines (both prescribed and over-the-counter medicines). Read all labels to be sure you don't take too much.    For children, check the Tylenol bottle for the right dose (based on their age or weight).    3. If you have other health problems (like cancer, heart failure, an organ transplant or severe kidney disease): Call your specialty clinic if you don't feel better in the next 2 days.    4. Know when to call 911: Emergency warning signs include:    Trouble breathing or shortness of breath    Pain or pressure in the chest that doesn't go away    Feeling confused like you  haven't felt before, or not being able to wake up    Bluish-colored lips or face    5. Sign up for [employer]. We know it's scary to hear that you have COVID-19. We want to track your symptoms to make sure you're okay over the next 2 weeks. Please look for an email from [employer]--this is a free, online program that we'll use to keep in touch. To sign up, follow the link in the email. Learn more at [email or web address].    Where can I get more information?    [facility]: [email or web address]    Coronavirus Basics: www.health.[facility].[place]./diseases/coronavirus/basics.html    What to Do If You're Sick: www.cdc.gov/coronavirus/2019-ncov/about/steps-when-sick.html    Ending Home Isolation: www.cdc.gov/coronavirus/2019-ncov/hcp/disposition-in-home-patients.html     Caring for Someone with COVID-19: www.cdc.gov/coronavirus/2019-ncov/if-you-are-sick/care-for-someone.html     [facility] clinical trials (COVID-19 research studies): clinicalaffairs.[email or web address]-clinical-trials     A Positive COVID-19 letter will be sent via [employer] or the mail. (Exception, no letters sent to Presurgerical/Preprocedure Patients)    Amna Kern, MSN, RN

## 2020-11-02 NOTE — TELEPHONE ENCOUNTER
Patient called [facility] again, forms still forthcoming.   Her covid test is positive.     Tammy Lewis RNC

## 2020-11-02 NOTE — TELEPHONE ENCOUNTER
We have not received any paperwork yet, I sent Mare a mychart note letting her know we haven't received anything yet.   Tammy Lewis RNC

## 2020-11-05 NOTE — TELEPHONE ENCOUNTER
I still have not seen the forms patient needs to have filled out.  Have we received them?    Carley Kern M.D.

## 2020-11-06 NOTE — TELEPHONE ENCOUNTER
I'm still waiting on the forms, have they arrived  And are in the forms bin??    Carley Kern M.D.

## 2020-11-07 ENCOUNTER — HEALTH MAINTENANCE LETTER (OUTPATIENT)
Age: 60
End: 2020-11-07

## 2020-11-09 NOTE — TELEPHONE ENCOUNTER
I have not yet seen forms - are these in the forms bin?  Patient sent another Rezora message regarding this as well.    Carley Kern M.D.

## 2020-11-11 NOTE — TELEPHONE ENCOUNTER
Are we still waiting on these forms?  Please notify patient of such.    Today is my last day in clinic until next week if I am going to complete forms.    Carley Kern M.D.

## 2020-11-11 NOTE — TELEPHONE ENCOUNTER
Called [facility] x 3, followed prompts, waited on hold and was ultimately disconnected each time.      Meka Trinidad RN

## 2020-11-13 ENCOUNTER — TELEPHONE (OUTPATIENT)
Dept: FAMILY MEDICINE | Facility: CLINIC | Age: 60
End: 2020-11-13

## 2020-11-13 NOTE — TELEPHONE ENCOUNTER
Called and left message to call me back, need to know what date first missed work  Zoey Card CMA

## 2020-11-13 NOTE — TELEPHONE ENCOUNTER
Reason for Call:  Form, our goal is to have forms completed with 72 hours, however, some forms may require a visit or additional information.    Type of letter, form or note:  disability    Who is the form from?: the [facility] (if other please explain)    Where did the form come from: form was faxed in    What clinic location was the form placed at?: [facility]    Where the form was placed: forms Box/Folder    What number is listed as a contact on the form?: [phone]  [phone]       Additional comments: also faxed the request for medical records to [facility] for records request    Call taken on 11/13/2020 at 10:18 AM by Rita Evans

## 2020-12-31 ENCOUNTER — TRANSFERRED RECORDS (OUTPATIENT)
Dept: HEALTH INFORMATION MANAGEMENT | Facility: CLINIC | Age: 60
End: 2020-12-31

## 2021-01-08 ENCOUNTER — ALLIED HEALTH/NURSE VISIT (OUTPATIENT)
Dept: FAMILY MEDICINE | Facility: CLINIC | Age: 61
End: 2021-01-08
Payer: COMMERCIAL

## 2021-01-08 DIAGNOSIS — Z23 NEED FOR VACCINATION: Primary | ICD-10-CM

## 2021-01-08 PROCEDURE — 90471 IMMUNIZATION ADMIN: CPT

## 2021-01-08 PROCEDURE — 99207 PR NO CHARGE NURSE ONLY: CPT

## 2021-01-08 PROCEDURE — 90750 HZV VACC RECOMBINANT IM: CPT

## 2021-01-08 NOTE — NURSING NOTE
Prior to immunization administration, verified patients identity using patient s name and date of birth. Please see Immunization Activity for additional information.     Screening Questionnaire for Adult Immunization    Are you sick today?   No   Do you have allergies to medications, food, a vaccine component or latex?   yes   Have you ever had a serious reaction after receiving a vaccination?   No   Do you have a long-term health problem with heart, lung, kidney, or metabolic disease (e.g., diabetes), asthma, a blood disorder, no spleen, complement component deficiency, a cochlear implant, or a spinal fluid leak?  Are you on long-term aspirin therapy?   No   Do you have cancer, leukemia, HIV/AIDS, or any other immune system problem?   No   Do you have a parent, brother, or sister with an immune system problem?   No   In the past 3 months, have you taken medications that affect  your immune system, such as prednisone, other steroids, or anticancer drugs; drugs for the treatment of rheumatoid arthritis, Crohn s disease, or psoriasis; or have you had radiation treatments?   No   Have you had a seizure, or a brain or other nervous system problem?   No   During the past year, have you received a transfusion of blood or blood    products, or been given immune (gamma) globulin or antiviral drug?   No   For women: Are you pregnant or is there a chance you could become       pregnant during the next month?   No   Have you received any vaccinations in the past 4 weeks?   No     Immunization questionnaire answers were all negative.        Per orders of Dr. Mcghee, injection of Shingrix given by Zoey Escobar CMA. Patient instructed to remain in clinic for 15 minutes afterwards, and to report any adverse reaction to me immediately.       Screening performed by Zoey Escobar CMA on 1/8/2021 at 2:20 PM.

## 2021-01-19 ENCOUNTER — TRANSFERRED RECORDS (OUTPATIENT)
Dept: HEALTH INFORMATION MANAGEMENT | Facility: CLINIC | Age: 61
End: 2021-01-19

## 2021-05-29 ENCOUNTER — RECORDS - HEALTHEAST (OUTPATIENT)
Dept: ADMINISTRATIVE | Facility: CLINIC | Age: 61
End: 2021-05-29

## 2021-06-08 NOTE — TELEPHONE ENCOUNTER
RECORDS RECEIVED FROM: NEEDS EOS XR. 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 2nd opinion NEEDS INJECTION & XR & MRI & PT NOTES & PROGRESS NOTES FROM [facility]. saw  in past for Sacrum Fracture. Lumbar Facet Arthritis & Spondy. & Stenosis per Zoey WORRELL     DATE RECEIVED: Jun 17, 2021     NOTES STATUS DETAILS   OFFICE NOTE from referring provider In process    OFFICE NOTE from other specialist Internal    DISCHARGE SUMMARY from hospital N/A    DISCHARGE REPORT from the ER N/A    OPERATIVE REPORT N/A    MEDICATION LIST Internal    IMPLANT RECORD/STICKER N/A    LABS     CBC/DIFF N/A    CULTURES N/A    INJECTIONS DONE IN RADIOLOGY N/A    MRI In process    CT SCAN In process    XRAYS (IMAGES & REPORTS) In process    TUMOR     PATHOLOGY  Slides & report N/A    06/15/21   9:10 AM   RECEIVED FAX FROM [facility], THEY WONT SEND IMAGES  WITHOUT SIGNED GIANCARLO, BUT THEY HAVE SENT RECORDS WHICH IS WEIRD.  CALLED PATIENT SHE IS OUT OF STATE BUT WILL BRING DISC WITH IMAGES.  UPDATED CARE TEAM.  Belle Samuels CMA    06/14/21   10:10 AM   REQUEST SENT TO [facility] of [phone].  3RD REQUEST SENT TO JOSHUA Samuels CMA    06/10/21   11:04 AM   FAXED 2ND REQUEST TO [facility]   [phone]      Belle Samuels CMA    06/08/21   11:55 AM   FAXED REQUEST TO JOSHUA Samuels CMA

## 2021-06-15 ASSESSMENT — ENCOUNTER SYMPTOMS
BACK PAIN: 1
MYALGIAS: 0
MUSCLE CRAMPS: 1
STIFFNESS: 0
ARTHRALGIAS: 0
NECK PAIN: 0
MUSCLE WEAKNESS: 0
JOINT SWELLING: 0

## 2021-06-17 ENCOUNTER — ANCILLARY PROCEDURE (OUTPATIENT)
Dept: GENERAL RADIOLOGY | Facility: CLINIC | Age: 61
End: 2021-06-17
Attending: ORTHOPAEDIC SURGERY
Payer: COMMERCIAL

## 2021-06-17 ENCOUNTER — OFFICE VISIT (OUTPATIENT)
Dept: ORTHOPEDICS | Facility: CLINIC | Age: 61
End: 2021-06-17
Payer: COMMERCIAL

## 2021-06-17 ENCOUNTER — PRE VISIT (OUTPATIENT)
Dept: ORTHOPEDICS | Facility: CLINIC | Age: 61
End: 2021-06-17

## 2021-06-17 VITALS — HEIGHT: 66 IN | WEIGHT: 164.1 LBS | BODY MASS INDEX: 26.37 KG/M2

## 2021-06-17 DIAGNOSIS — M43.10 DEGENERATIVE SPONDYLOLISTHESIS: Primary | ICD-10-CM

## 2021-06-17 DIAGNOSIS — M43.10 DEGENERATIVE SPONDYLOLISTHESIS: ICD-10-CM

## 2021-06-17 PROCEDURE — 72110 X-RAY EXAM L-2 SPINE 4/>VWS: CPT | Performed by: RADIOLOGY

## 2021-06-17 PROCEDURE — 99204 OFFICE O/P NEW MOD 45 MIN: CPT | Mod: GC | Performed by: ORTHOPAEDIC SURGERY

## 2021-06-17 PROCEDURE — 72170 X-RAY EXAM OF PELVIS: CPT | Performed by: RADIOLOGY

## 2021-06-17 ASSESSMENT — MIFFLIN-ST. JEOR: SCORE: 1333.35

## 2021-06-17 NOTE — PROGRESS NOTES
"Service Date: 06/17/2021    HISTORY OF PRESENT ILLNESS:  Ms. Dominguez is a 61-year-old female [facility] opinion.  She is presenting with 1 year symptoms of lower back pain and mild radicular symptoms into the right worse than left leg.  Over the last 2 months, there have been sharp, radiating pains associated with this.  Worse with physical activity and standing.  She is able to sit for any duration of time that she would like.  Able to walk 4 miles.  She will have mild pain sometimes throughout the walk but is able to manage without resting.  Pain originates from the midline and tends to radiate around the buttock and down the back of her leg.  In the right leg, it goes down to the heel and along the lateral surface of her foot.  In the left leg, the pain radiates to just above the knee and stops.  There is no decreased sensation or weakness associated with this.  In 2013, she had a fall and suffered a nondisplaced sacral fracture.  This was treated conservatively.      PHYSICAL EXAMINATION:.  Ht 1.68 m (5' 6.14\")   Wt 74.4 kg (164 lb 1.6 oz)   LMP 10/18/2012   BMI 26.37 kg/m    ANASTACIA 18%.  VAS 2 but gets up to a 9.  The patient is alert and communicates appropriately.  Strength and sensation intact in bilateral lower extremities.  DTRs were normal and symmetric throughout.  No recreation of pain with facet loading maneuver bilaterally.  No pain with palpation over bilateral PSIS or along the piriformis muscle.  No pain with stretch of the piriformis muscle.  Direct pressure onto the sacrum does not elicit pain.    IMAGING:  Lumbar MRI dated 01/13/2021 was reviewed.  This demonstrates degenerative disk disease at the L5-S1 level with significant disk height collapse.  There is T2 signal change around the anterior portion of the endplates at the L5-S1 level.  Grade 1 spondylolisthesis at the L4-L5 level and very mild retrolisthesis at the L3-L4 level.  No significant " neural foraminal stenosis.  Mild central canal stenosis at the L4-L5 level.  T2 signal within the facet joint spaces bilaterally at the L4-L5 level.  CT abdomen dated 2019 was reviewed.  This demonstrates mild facet arthropathy at the L4-L5 level.  There is vacuum sign present at the right L4-L5 facet.  Lumbar upright and flexion and extension x-rays were obtained.  When upright, the spondylolisthesis at the L4-L5 level worsens.  There is movement of both the retrolisthesis and spondylolisthesis with flexion and extension.          ASSESSMENT:    1.  L4-L5 grade 1 spondylolisthesis with symptoms of claudication.  2.  L3-L4 retrolisthesis.  3.  Severe L5-S1 degenerative disk disease.    PLAN:    1.  The patient was offered an L4-L5 TLIF for decompression and stabilization of spondylolisthesis.  The details of the surgery as well as its risks were discussed with the patient. The risks include, but are not limited to, death, myocardial infarction, pulmonary embolism, deep venous thrombosis, pneumonia, bleeding, infection, nerve damage, muscle damage, stiffness, instability, continued or worsening pain, and need for future surgery.  2.  The patient would like to go home and think about the offered a surgical intervention.  She feels the pain is significant enough that she would like to undergo surgery, but wants time to consider.  We will fill out a surgery form today, and if she elects to move forward with intervention, she can give us a call and we will go ahead and schedule her.        Paul Romero Jr, MD    As Dictated by KEYA ZAMORANO MD      I saw and evaluated the patient and developed the plan.  Paul Romero MD        D: 2021   T: 2021   MT: raine    Name:     ANGÉLICA QUINTERO  MRN:      [id number]        Account:      [id number]   :      1960           Service Date: 2021       Document: [id number]

## 2021-06-17 NOTE — LETTER
"    6/17/2021         RE: Mare Dominguez  [address] [facility] [address]        Dear Colleague,    Thank you for referring your patient, Mare Dominguez, to the [facility] [place]. Please see a copy of my visit note below.    Service Date: 06/17/2021    HISTORY OF PRESENT ILLNESS:  Ms. Dominguez is a 61-year-old female presenting [facility] opinion.  She is presenting with 1 year symptoms of lower back pain and mild radicular symptoms into the right worse than left leg.  Over the last 2 months, there have been sharp, radiating pains associated with this.  Worse with physical activity and standing.  She is able to sit for any duration of time that she would like.  Able to walk 4 miles.  She will have mild pain sometimes throughout the walk but is able to manage without resting.  Pain originates from the midline and tends to radiate around the buttock and down the back of her leg.  In the right leg, it goes down to the heel and along the lateral surface of her foot.  In the left leg, the pain radiates to just above the knee and stops.  There is no decreased sensation or weakness associated with this.  In 2013, she had a fall and suffered a nondisplaced sacral fracture.  This was treated conservatively.      PHYSICAL EXAMINATION:.  Ht 1.68 m (5' 6.14\")   Wt 74.4 kg (164 lb 1.6 oz)   LMP 10/18/2012   BMI 26.37 kg/m    ANASTACIA 18%.  VAS 2 but gets up to a 9.  The patient is alert and communicates appropriately.  Strength and sensation intact in bilateral lower extremities.  DTRs were normal and symmetric throughout.  No recreation of pain with facet loading maneuver bilaterally.  No pain with palpation over bilateral PSIS or along the piriformis muscle.  No pain with stretch of the piriformis muscle.  Direct pressure onto the sacrum does not elicit pain.    IMAGING:  Lumbar MRI dated 01/13/2021 was reviewed.  This demonstrates degenerative " disk disease at the L5-S1 level with significant disk height collapse.  There is T2 signal change around the anterior portion of the endplates at the L5-S1 level.  Grade 1 spondylolisthesis at the L4-L5 level and very mild retrolisthesis at the L3-L4 level.  No significant neural foraminal stenosis.  Mild central canal stenosis at the L4-L5 level.  T2 signal within the facet joint spaces bilaterally at the L4-L5 level.  CT abdomen dated 08/09/2019 was reviewed.  This demonstrates mild facet arthropathy at the L4-L5 level.  There is vacuum sign present at the right L4-L5 facet.  Lumbar upright and flexion and extension x-rays were obtained.  When upright, the spondylolisthesis at the L4-L5 level worsens.  There is movement of both the retrolisthesis and spondylolisthesis with flexion and extension.          ASSESSMENT:    1.  L4-L5 grade 1 spondylolisthesis with symptoms of claudication.  2.  L3-L4 retrolisthesis.  3.  Severe L5-S1 degenerative disk disease.    PLAN:    1.  The patient was offered an L4-L5 TLIF for decompression and stabilization of spondylolisthesis.  The details of the surgery as well as its risks were discussed with the patient. The risks include, but are not limited to, death, myocardial infarction, pulmonary embolism, deep venous thrombosis, pneumonia, bleeding, infection, nerve damage, muscle damage, stiffness, instability, continued or worsening pain, and need for future surgery.  2.  The patient would like to go home and think about the offered a surgical intervention.  She feels the pain is significant enough that she would like to undergo surgery, but wants time to consider.  We will fill out a surgery form today, and if she elects to move forward with intervention, she can give us a call and we will go ahead and schedule her.        Paul Romero Jr, MD    As Dictated by KEYA ZAMORANO MD      I saw and evaluated the patient and developed the plan.  Paul Romero MD        D:  2021   T: 2021   MT: raine    Name:     ANGÉLICA QIUNTERO  MRN:      [id number]        Account:      [id number]   :      1960           Service Date: 2021       Document: [id number]

## 2021-06-17 NOTE — NURSING NOTE
"Reason For Visit:   Chief Complaint   Patient presents with     Consult     2nd opinion Lumbar Facet Arthritis & Spondy. & Stenosis        Primary MD: Carley Kern  Ref. MD: Est    ?  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    Date of injury: No  Type of injury: no.    Date of surgery: 1999  Type of surgery: Discectomy L4-5.    Smoker: No  Request smoking cessation information: No    Ht 1.68 m (5' 6.14\")   Wt 74.4 kg (164 lb 1.6 oz)   LMP 10/18/2012   BMI 26.37 kg/m      Pain Assessment  Patient Currently in Pain: Yes  0-10 Pain Scale: 2  Primary Pain Location: Back    Oswestry (ANASTACIA) Questionnaire    OSWESTRY DISABILITY INDEX 6/15/2021   Count 10   Sum 9   Oswestry Score (%) 18   Some recent data might be hidden        Visual Analog Pain Scale  Back Pain Scale 0-10: 2  Right leg pain: 2(gets up to 9/10)  Left leg pain: 0  Neck Pain Scale 0-10: 0  Right arm pain: 0  Left arm pain: 0    Promis 10 Assessment    PROMIS 10 6/15/2021   In general, would you say your health is: Very good   In general, would you say your quality of life is: Very good   In general, how would you rate your physical health? Very good   In general, how would you rate your mental health, including your mood and your ability to think? Very good   In general, how would you rate your satisfaction with your social activities and relationships? Very good   In general, please rate how well you carry out your usual social activities and roles Very good   To what extent are you able to carry out your everyday physical activities such as walking, climbing stairs, carrying groceries, or moving a chair? Mostly   How often have you been bothered by emotional problems such as feeling anxious, depressed or irritable? Never   How would you rate your fatigue on average? None   How would you rate your pain on average?   0 = No Pain  to  10 = Worst Imaginable Pain 5   In general, would you say your health " is: 4   In general, would you say your quality of life is: 4   In general, how would you rate your physical health? 4   In general, how would you rate your mental health, including your mood and your ability to think? 4   In general, how would you rate your satisfaction with your social activities and relationships? 4   In general, please rate how well you carry out your usual social activities and roles. (This includes activities at home, at work and in your community, and responsibilities as a parent, child, spouse, employee, friend, etc.) 4   To what extent are you able to carry out your everyday physical activities such as walking, climbing stairs, carrying groceries, or moving a chair? 4   In the past 7 days, how often have you been bothered by emotional problems such as feeling anxious, depressed, or irritable? 1   In the past 7 days, how would you rate your fatigue on average? 1   In the past 7 days, how would you rate your pain on average, where 0 means no pain, and 10 means worst imaginable pain? 5   Global Mental Health Score 17   Global Physical Health Score 16   PROMIS TOTAL - SUBSCORES 33   Some recent data might be hidden                Maci Mcgee LPN

## 2021-07-28 ENCOUNTER — PREP FOR PROCEDURE (OUTPATIENT)
Dept: ORTHOPEDICS | Facility: CLINIC | Age: 61
End: 2021-07-28

## 2021-07-28 ENCOUNTER — TELEPHONE (OUTPATIENT)
Dept: ORTHOPEDICS | Facility: CLINIC | Age: 61
End: 2021-07-28

## 2021-07-28 DIAGNOSIS — M43.10 DEGENERATIVE SPONDYLOLISTHESIS: Primary | ICD-10-CM

## 2021-07-28 NOTE — TELEPHONE ENCOUNTER
Phoned patient to schedule surgery with Dr Romero. I left her my direct number to call back at her convenience. [phone]

## 2021-07-30 PROBLEM — M43.10 DEGENERATIVE SPONDYLOLISTHESIS: Status: ACTIVE | Noted: 2021-07-30

## 2021-07-30 NOTE — TELEPHONE ENCOUNTER
Patient is scheduled for surgery with Dr. Romero    Spoke with: Patient    Date of Surgery: 10/11/21    Location: [facility]    Post op: 6 & 12 weeks    Pre op with Provider: Complete    H&P: Patient will call back to set up PAC    Pre-procedure COVID-19 Test: Will set up when patient calls back for PAC    Additional imaging/appointments: N/A    Surgery packet: Received in clinic     Additional comments: N/A

## 2021-08-01 DIAGNOSIS — Z11.59 ENCOUNTER FOR SCREENING FOR OTHER VIRAL DISEASES: ICD-10-CM

## 2021-08-05 ENCOUNTER — TELEPHONE (OUTPATIENT)
Dept: ORTHOPEDICS | Facility: CLINIC | Age: 61
End: 2021-08-05

## 2021-08-05 NOTE — TELEPHONE ENCOUNTER
FUTURE VISIT INFORMATION        SURGERY INFORMATION:    Date: 9/15/21    Location: [facility] OR    Surgeon:  Paul Romero MD    Anesthesia Type:  general    Procedure: Decompression and transforaminal lumbar interbody fusion Lumbar 4 to 5    Consult: ov 6/17     RECORDS REQUESTED FROM:         Primary Care Provider: Carley Kern MD- [facility]

## 2021-08-05 NOTE — TELEPHONE ENCOUNTER
FUTURE VISIT INFORMATION      SURGERY INFORMATION:    Date: 10/11/21    Location: [facility]    Surgeon:  Paul Romero MD    Anesthesia Type:  general    Procedure: Decompression and transforaminal lumbar interbody fusion Lumbar 4 to 5    Consult: ov 6/17    RECORDS REQUESTED FROM:       Primary Care Provider: Carley Kern MD- [facility]

## 2021-08-05 NOTE — TELEPHONE ENCOUNTER
Received call from patient requesting to move up her surgery with Dr Romero. Patient has been rescheduled from 10/11/21 to 9/15/21. Patient will see PAC on 8/20/21 and have her COVID test in [place] on 9/13/21.

## 2021-08-20 ENCOUNTER — OFFICE VISIT (OUTPATIENT)
Dept: SURGERY | Facility: CLINIC | Age: 61
End: 2021-08-20
Payer: COMMERCIAL

## 2021-08-20 ENCOUNTER — PRE VISIT (OUTPATIENT)
Dept: SURGERY | Facility: CLINIC | Age: 61
End: 2021-08-20

## 2021-08-20 ENCOUNTER — LAB (OUTPATIENT)
Dept: LAB | Facility: CLINIC | Age: 61
End: 2021-08-20
Payer: COMMERCIAL

## 2021-08-20 ENCOUNTER — ANESTHESIA EVENT (OUTPATIENT)
Dept: SURGERY | Facility: CLINIC | Age: 61
End: 2021-08-20

## 2021-08-20 VITALS
HEIGHT: 66 IN | TEMPERATURE: 97.5 F | HEART RATE: 67 BPM | RESPIRATION RATE: 16 BRPM | OXYGEN SATURATION: 100 % | DIASTOLIC BLOOD PRESSURE: 90 MMHG | BODY MASS INDEX: 26.03 KG/M2 | WEIGHT: 162 LBS | SYSTOLIC BLOOD PRESSURE: 148 MMHG

## 2021-08-20 DIAGNOSIS — Z01.818 PREOP EXAMINATION: ICD-10-CM

## 2021-08-20 DIAGNOSIS — M43.10 DEGENERATIVE SPONDYLOLISTHESIS: ICD-10-CM

## 2021-08-20 DIAGNOSIS — M43.10 DEGENERATIVE SPONDYLOLISTHESIS: Primary | ICD-10-CM

## 2021-08-20 LAB
ABO/RH(D): NORMAL
ANION GAP SERPL CALCULATED.3IONS-SCNC: 2 MMOL/L (ref 3–14)
ANTIBODY SCREEN: NEGATIVE
BUN SERPL-MCNC: 11 MG/DL (ref 7–30)
CALCIUM SERPL-MCNC: 9.3 MG/DL (ref 8.5–10.1)
CHLORIDE BLD-SCNC: 106 MMOL/L (ref 94–109)
CO2 SERPL-SCNC: 30 MMOL/L (ref 20–32)
CREAT SERPL-MCNC: 0.7 MG/DL (ref 0.52–1.04)
ERYTHROCYTE [DISTWIDTH] IN BLOOD BY AUTOMATED COUNT: 13 % (ref 10–15)
GFR SERPL CREATININE-BSD FRML MDRD: >90 ML/MIN/1.73M2
GLUCOSE BLD-MCNC: 100 MG/DL (ref 70–99)
HCT VFR BLD AUTO: 38.3 % (ref 35–47)
HGB BLD-MCNC: 12.8 G/DL (ref 11.7–15.7)
MCH RBC QN AUTO: 31 PG (ref 26.5–33)
MCHC RBC AUTO-ENTMCNC: 33.4 G/DL (ref 31.5–36.5)
MCV RBC AUTO: 93 FL (ref 78–100)
PLATELET # BLD AUTO: 237 10E3/UL (ref 150–450)
POTASSIUM BLD-SCNC: 4.3 MMOL/L (ref 3.4–5.3)
RBC # BLD AUTO: 4.13 10E6/UL (ref 3.8–5.2)
SODIUM SERPL-SCNC: 138 MMOL/L (ref 133–144)
SPECIMEN EXPIRATION DATE: NORMAL
WBC # BLD AUTO: 4.3 10E3/UL (ref 4–11)

## 2021-08-20 PROCEDURE — 99204 OFFICE O/P NEW MOD 45 MIN: CPT | Performed by: PHYSICIAN ASSISTANT

## 2021-08-20 PROCEDURE — 85027 COMPLETE CBC AUTOMATED: CPT | Performed by: PATHOLOGY

## 2021-08-20 PROCEDURE — 36415 COLL VENOUS BLD VENIPUNCTURE: CPT | Performed by: PATHOLOGY

## 2021-08-20 PROCEDURE — 80048 BASIC METABOLIC PNL TOTAL CA: CPT | Performed by: PATHOLOGY

## 2021-08-20 PROCEDURE — 86900 BLOOD TYPING SEROLOGIC ABO: CPT | Performed by: PHYSICIAN ASSISTANT

## 2021-08-20 ASSESSMENT — MIFFLIN-ST. JEOR: SCORE: 1321.58

## 2021-08-20 ASSESSMENT — PAIN SCALES - GENERAL: PAINLEVEL: MILD PAIN (3)

## 2021-08-20 NOTE — PATIENT INSTRUCTIONS
Preparing for Your Surgery      Name:  Mare Dominguez   MRN:  [id number]   :  1960   Today's Date:  2021       Arriving for surgery:  Surgery date:  9/15/21  Arrival time:  5:30 am    Restrictions due to COVID 19:       One visitor is allowed in the Pre Op area. When you go into surgery, one visitor is allowed to wait in the Surgery Waiting Room       (provided there is enough space to social distance).   After surgery- Two visitors are allowed at a time if you have a private room and one visitor is allowed for those in a semi-private room.   Every 4 days the visitor(s) can rotate. During the 4 day period, the visitor(s) must be consistent. No visitors under the age of 18 years old.   Visiting Hours: 8 am - 8:30 pm   No ill visitors.   All visitors must wear face mask.    [employer] parking is available for anyone with mobility limitations or disabilities.  ([facility]  24 hours/ 7 days a week; [facility]  7 am- 3:30 pm, Mon- Fri)    Please come to:     [facility] [address]  [address] [place]  [address]    -Proceed to the 3rd floor, check in at the Adult Surgery Waiting Lounge. [phone]    If an escort is needed stop at the Information Desk in the lobby. Inform the information person that you are here for surgery. An escort to the Adult Surgery Waiting Lounge will be provided.    What can I eat or drink?  -  You may eat and drink normally for up to 8 hours before your surgery. (Until 11:30 pm 21)  -  You may have clear liquids until 2 hours before surgery. (Until 5:30 am)    Examples of clear liquids:  Water  Clear broth  Juices (apple, white grape, white cranberry  and cider) without pulp  Noncarbonated, powder based beverages  (lemonade and Vin-Aid)  Sodas (Sprite, 7-Up, ginger ale and seltzer)  Coffee or tea (without milk or cream)  Gatorade    -  No Alcohol for at least 24 hours before surgery     Which medicines can I  take?  Hold Aspirin for 7 days before surgery.   Hold Multivitamins for 7 days before surgery.  * Hold Supplements for 7 days before surgery. Take last Flaxseed, Cranberry, and Glucosamine on 9-7-21 and hold until surgery.  Hold Ibuprofen (Advil, Motrin) for 1 day before surgery--unless otherwise directed by surgeon.  Hold Naproxen (Aleve) for 4 days before surgery.    -  DO NOT take these medications the day of surgery:  Calcium Carbonate-Vitamin D, Miralax,     -  PLEASE TAKE these medications the day of surgery:  Acetaminophen (Tylenol) if needed  Esomeprazole (Nexium) if needed    How do I prepare myself?  - Please take 2 showers before surgery using Scrubcare or Hibiclens soap.    Use this soap only from the neck to your toes.     Leave the soap on your skin for one minute--then rinse thoroughly.      You may use your own shampoo and conditioner; no other hair products.   - Please remove all jewelry and body piercings.  - No lotions, deodorants or fragrance.  - No makeup or fingernail polish.   - Bring your ID and insurance card.    -If you have a Deep Brain Stimulator, Spinal Cord Stimulator or any neuro stimulator device---you must bring the remote control to the hospital     - All patients are required to have a Covid-19 test within 4 days of surgery/procedure.      -Patients will be contacted by the [facility] scheduling team within 1 week of surgery to make an appointment.      - Patients may call the Scheduling team at [phone] if they have not been scheduled within 4 days of  surgery.      ALL PATIENTS GOING HOME THE SAME DAY OF SURGERY ARE REQUIRED TO HAVE A RESPONSIBLE ADULT TO DRIVE AND BE IN ATTENDANCE WITH THEM FOR 24 HOURS FOLLOWING SURGERY.      Questions or Concerns:    - For any questions regarding the day of surgery or your hospital stay, please contact the Pre Admission Nursing Office at [phone].       - If you have health changes between today and your surgery please call your  surgeon.       For questions after surgery please call your surgeons office.

## 2021-08-20 NOTE — H&P
Pre-Operative H & P     CC:  Preoperative exam to assess for increased cardiopulmonary risk while undergoing surgery and anesthesia.    Date of Encounter: 8/20/2021  Primary Care Physician:  Carley Kern  Associated Diagnosis: degenerative spondylolisthesis    HPI  Mare Dominguez is a 61 year old female who presents for pre-operative H & P in preparation for Decompression and transforaminal lumbar interbody fusion Lumbar 4 to 5 with Dr. Romero on 9/15/21 at [facility].     This is a 61-year-old female with past medical history significant for GERD and constipation.  She has approximate 1 year history of low back pain with radicular symptoms that radiate into her legs but more so the right leg.  Her symptoms have been getting worse over the last several months.  She experiences sharp shooting pains.  This is worse with activity and standing.  She can walk 3 to 4 miles and does some regularly but avoids any higher intensity exercise due to her pain.  Patient has been evaluated by Dr. Romero and the above surgery has been scheduled for treatment.  She is ready to proceed.    History is obtained from the patient and the medical record.     Past Medical History  Past Medical History:   Diagnosis Date     Basal cell carcinoma      Cervical high risk HPV (human papillomavirus) test positive 07/28/2017    Not 16/18     Cervicalgia     , MRI 10/05 c6-c7 sever left foraminal stenosis, s/p epidural injection 10/20/05     Degeneration of lumbar or lumbosacral intervertebral disc     degen disc lumbar      Other and unspecified malignant neoplasm of skin of other and unspecified parts of face 1994    Basal cell cancer right nose     Other and unspecified ovarian cyst 1978    ovarian cyst -Mare thought that her right ovary was removed, but in fact in retrospect it appears that the left has been removed.     S/P lumpectomy of breast 10/22/2001    excision left breast       Past  Surgical History  Past Surgical History:   Procedure Laterality Date     ARTHROSCOPIC RECONSTRUCTION ANTERIOR CRUCIATE LIGAMENT Left 2013     COLONOSCOPY  07/26/2012    Procedure: COLONOSCOPY;  Colonoscopy;  Surgeon: Norberto Rivera MD;  Location: [facility]     LAPAROSCOPIC APPENDECTOMY N/A 08/09/2019    Procedure: APPENDECTOMY, LAPAROSCOPIC;  Surgeon: Paul Rodriguez MD;  Location: [facility]     SURGICAL HISTORY OF -   01/01/1978    Ovarian cyst laparotomy      SURGICAL HISTORY OF -   01/01/1999    Discectomy lumbar     SURGICAL HISTORY OF -   08/30/1994    Mohs' excision basal cell carcinoma right nasal root       Hx of Blood transfusions/reactions: denies     Hx of abnormal bleeding or anti-platelet use: denies    Menstrual history: Patient's last menstrual period was 10/18/2012.:     Steroid use in the last year: denies    Personal or FH with difficulty with Anesthesia:  denies    Prior to Admission Medications  Current Outpatient Medications   Medication Sig Dispense Refill     Calcium Carbonate-Vitamin D (CALCIUM + D) 600-200 MG-UNIT per tablet Take 2 tablets by mouth daily        CRANBERRY PO Take 30,000 mg by mouth daily       esomeprazole (NEXIUM) 20 MG DR capsule Take 20 mg by mouth daily as needed Take 30-60 minutes before eating.       Flaxseed, Linseed, (FLAX SEED OIL) 1000 MG capsule Take 1 capsule by mouth 2 times daily        GLUCOSAMINE 500 MG OR CAPS Take 1,500 mg by mouth 2 times daily        magnesium 250 MG tablet Take 1 tablet by mouth every evening        MIRALAX OR POWD STIR 1 CAPFUL (17GM) IN 8 OZ OF LIQUID AND DRINK (Patient taking differently: Take 0.5 capfuls by mouth every morning ) 1 Bottle prn     hydrocortisone, Perianal, (HYDROCORTISONE) 2.5 % cream Place rectally 2 times daily as needed for hemorrhoids (Patient not taking: Reported on 8/20/2021) 30 g 1     Lactobacillus (ACIDOPHILUS PO)  (Patient not taking: Reported on 8/20/2021)       Ranitidine HCl (ZANTAC PO) Take  by mouth.  (Patient not taking: Reported on 8/20/2021)         Allergies  Allergies   Allergen Reactions     Hydrocodone Nausea and Vomiting     Latex Rash     Topical rash from gloves     Percocet [Oxycodone-Acetaminophen] Nausea and Vomiting     Sulfa Drugs Hives       Social History  Social History     Socioeconomic History     Marital status:      Spouse name: Samson     Number of children: 4     Years of education: 18+     Highest education level: Not on file   Occupational History     Occupation: PA     Employer: [employer]     Employer: [employer]   Tobacco Use     Smoking status: Never Smoker     Smokeless tobacco: Never Used   Substance and Sexual Activity     Alcohol use: Yes     Comment: every day, 1-2     Drug use: No     Sexual activity: Yes     Partners: Male     Birth control/protection: Surgical     Comment: Vas,husb   Other Topics Concern      Service No     Blood Transfusions No     Caffeine Concern Yes     Comment: 3-4 cups coffee     Occupational Exposure Yes     Comment: body fluid     Hobby Hazards Yes     Comment: downhill skiing     Sleep Concern No     Stress Concern Yes     Comment: work     Weight Concern No     Special Diet Yes     Comment: special K diet     Back Care Yes     Comment: reg chiropractic care     Exercise No     Bike Helmet Yes     Seat Belt Yes     Self-Exams Yes     Parent/sibling w/ CABG, MI or angioplasty before 65F 55M? Yes     Comment: maternal grandfather MI late 50's or early 60's   Social History Narrative     Not on file     Social Determinants of Health     Financial Resource Strain:      Difficulty of Paying Living Expenses:    Food Insecurity:      Worried About Running Out of Food in the Last Year:      Ran Out of Food in the Last Year:    Transportation Needs:      Lack of Transportation (Medical):      Lack of Transportation (Non-Medical):    Physical Activity:      Days of Exercise per Week:      Minutes of Exercise per Session:    Stress:   "    Feeling of Stress :    Social Connections:      Frequency of Communication with Friends and Family:      Frequency of Social Gatherings with Friends and Family:      Attends Yarsani Services:      Active Member of Clubs or Organizations:      Attends Club or Organization Meetings:      Marital Status:    Intimate Partner Violence:      Fear of Current or Ex-Partner:      Emotionally Abused:      Physically Abused:      Sexually Abused:        Family History  Family History   Problem Relation Age of Onset     Lipids Mother      Arrhythmia Mother      Neurologic Disorder Father         Parkinson's     Hypertension Maternal Grandmother      Heart Disease Maternal Grandfather      Cancer Paternal Grandfather         G.I.     Thyroid Disease Sister         Cancer     Cancer Sister         thyroid     ALS Sister            Anesthesia Evaluation   Pt has had prior anesthetic. Type: General and MAC.    No history of anesthetic complications       ROS/MED HX  ENT/Pulmonary:  - neg pulmonary ROS     Neurologic:  - neg neurologic ROS     Cardiovascular:  - neg cardiovascular ROS     METS/Exercise Tolerance: >4 METS    Hematologic:  - neg hematologic  ROS     Musculoskeletal: Comment: H/o lumbar discectomy  H/o left ACL repair      GI/Hepatic:     (+) GERD, Asymptomatic on medication,     Renal/Genitourinary:  - neg Renal ROS     Endo:  - neg endo ROS     Psychiatric/Substance Use:  - neg psychiatric ROS     Infectious Disease:  - neg infectious disease ROS     Malignancy:  - neg malignancy ROS     Other:  - neg other ROS          The complete review of systems is negative other than noted in the HPI or here.   Temp: 97.5  F (36.4  C) Temp src: Oral BP: (!) 156/97 Pulse: 67   Resp: 16 SpO2: 100 %         162 lbs 0 oz  5' 6\"[pt reported[   Body mass index is 26.15 kg/m .       Physical Exam  Constitutional: Awake, alert, cooperative, no apparent distress, and appears stated age.  Eyes: Pupils equal, round and reactive to " light, extra ocular muscles intact, sclera clear, conjunctiva normal.  HENT: Normocephalic, oral pharynx with moist mucus membranes, good dentition. No goiter appreciated.   Respiratory: Clear to auscultation bilaterally, no crackles or wheezing.  Cardiovascular: Regular rate and rhythm, normal S1 and S2, and no murmur noted.  Carotids +2, no bruits. No edema. Palpable pulses to radial  DP and PT arteries.   GI: Not assessed  Lymph/Hematologic: No cervical lymphadenopathy and no supraclavicular lymphadenopathy.  Genitourinary:  deferred  Skin: Warm and dry.   Musculoskeletal: Full ROM of neck. There is no redness, warmth, or swelling of the joints. Gross motor strength is normal.    Neurologic: Awake, alert, oriented to name, place and time. Cranial nerves II-XII are grossly intact. Neuropsychiatric: Calm, cooperative. Normal affect.     PRIOR LABS/DIAGNOSTIC STUDIES:  All labs and imaging personally reviewed      Component      Latest Ref Rng & Units 8/20/2021   WBC      4.0 - 11.0 10e3/uL 4.3   RBC Count      3.80 - 5.20 10e6/uL 4.13   Hemoglobin      11.7 - 15.7 g/dL 12.8   Hematocrit      35.0 - 47.0 % 38.3   MCV      78 - 100 fL 93   MCH      26.5 - 33.0 pg 31.0   MCHC      31.5 - 36.5 g/dL 33.4   RDW      10.0 - 15.0 % 13.0   Platelet Count      150 - 450 10e3/uL 237     Component      Latest Ref Rng & Units 8/20/2021   Sodium      133 - 144 mmol/L 138   Potassium      3.4 - 5.3 mmol/L 4.3   Chloride      94 - 109 mmol/L 106   Carbon Dioxide      20 - 32 mmol/L 30   Anion Gap      3 - 14 mmol/L 2 (L)   Urea Nitrogen      7 - 30 mg/dL 11   Creatinine      0.52 - 1.04 mg/dL 0.70   Calcium      8.5 - 10.1 mg/dL 9.3   Glucose      70 - 99 mg/dL 100 (H)   GFR Estimate      >60 mL/min/1.73m2 >90         4 views lumbar spine radiographs 6/17/2021  Impression:  1.  No acute osseous abnormality.  2.  Multilevel degenerative changes most pronounced at L5-S1.  3. Anterolisthesis of L4 on L5 without evidence of  motion.  4. No radiographic evidence of inflammatory sacroiliitis. Degenerative  changes of the left SI joint.          ASSESSMENT and PLAN  Mare Dominguez is a 61 year old female scheduled for Decompression and transforaminal lumbar interbody fusion Lumbar 4 to 5 on 9/15/21 by Dr. Romero in treatment of degenerative spondylolisthesis.  PAC referral for risk assessment and optimization for anesthesia:    Pre-operative considerations:  1.  Cardiac:  Functional status- METS >4.  Walks 3-4 miles but is not doing any high intensity exercise due to back pain.  Intermediate risk surgery with 0.4% (RCRI #) risk of major adverse cardiac event.   --denies chest pain, chest pressure, SOB, WILLARD, palpitations, edema  --no known cardiac history    2.  Pulm:  Airway feasible.  CLAUDAI risk: Low  --non smoker    3.  GI:  Risk of PONV score = 3.  If > 2, anti-emetic intervention recommended.  --h/o GERD, controlled with Nexium prn.  May take DOS if needed  --constipation, Miralax daily    VTE risk: 0.5%    Patient is optimized and is acceptable candidate for the proposed procedure.  No further diagnostic evaluation is needed.       Kyara Mchugh PA-C  Preoperative Assessment Center  [facility]  Phone: [phone]  Fax: [phone]

## 2021-08-20 NOTE — ANESTHESIA PREPROCEDURE EVALUATION
Anesthesia Pre-Procedure Evaluation    Patient: Mare Dominguez   MRN: [id number] : 1960        Preoperative Diagnosis: * No surgery found *   Procedure :      Past Medical History:   Diagnosis Date     Basal cell carcinoma      Cervical high risk HPV (human papillomavirus) test positive 2017    Not 16/18     Cervicalgia     , MRI 10/05 c6-c7 sever left foraminal stenosis, s/p epidural injection 10/20/05     Degeneration of lumbar or lumbosacral intervertebral disc     degen disc lumbar      Other and unspecified malignant neoplasm of skin of other and unspecified parts of face     Basal cell cancer right nose     Other and unspecified ovarian cyst     ovarian cyst -Mare thought that her right ovary was removed, but in fact in retrospect it appears that the left has been removed.     S/P lumpectomy of breast 10/22/2001    excision left breast      Past Surgical History:   Procedure Laterality Date     COLONOSCOPY  2012    Procedure: COLONOSCOPY;  Colonoscopy;  Surgeon: Norberto Rivera MD;  Location: [facility]     LAPAROSCOPIC APPENDECTOMY N/A 2019    Procedure: APPENDECTOMY, LAPAROSCOPIC;  Surgeon: Paul Rodriguez MD;  Location: [facility]     SURGICAL HISTORY OF -       Ovarian cyst laparotomy      SURGICAL HISTORY OF -       Discectomy lumbar     SURGICAL HISTORY OF -   1994    Mohs' excision basal cell carcinoma right nasal root      Allergies   Allergen Reactions     Latex Rash     Topical rash from gloves     Sulfa Drugs Hives      Social History     Tobacco Use     Smoking status: Never Smoker     Smokeless tobacco: Never Used   Substance Use Topics     Alcohol use: Yes     Comment: occ      Wt Readings from Last 1 Encounters:   21 74.4 kg (164 lb 1.6 oz)        Anesthesia Evaluation   Pt has had prior anesthetic. Type: General and MAC.    No history of anesthetic complications       ROS/MED HX  ENT/Pulmonary:  - neg pulmonary ROS     Neurologic:  - neg  neurologic ROS     Cardiovascular:  - neg cardiovascular ROS     METS/Exercise Tolerance: >4 METS    Hematologic:  - neg hematologic  ROS     Musculoskeletal: Comment: H/o lumbar discectomy  H/o left ACL repair      GI/Hepatic:     (+) GERD, Asymptomatic on medication,     Renal/Genitourinary:  - neg Renal ROS     Endo:  - neg endo ROS     Psychiatric/Substance Use:  - neg psychiatric ROS     Infectious Disease:  - neg infectious disease ROS     Malignancy:  - neg malignancy ROS     Other:  - neg other ROS          Physical Exam    Airway  airway exam normal      Mallampati: I   TM distance: > 3 FB   Neck ROM: full   Mouth opening: > 3 cm    Respiratory Devices and Support         Dental  no notable dental history         Cardiovascular   cardiovascular exam normal       Rhythm and rate: regular and normal     Pulmonary           breath sounds clear to auscultation           OUTSIDE LABS:  CBC:   Lab Results   Component Value Date    WBC 14.3 (H) 08/09/2019    WBC 6.6 02/18/2013    HGB 11.7 08/09/2019    HGB 12.6 02/18/2013    HCT 37.3 08/09/2019    HCT 37.7 02/18/2013     08/09/2019     02/18/2013     BMP:   Lab Results   Component Value Date     10/21/2020     08/09/2019    POTASSIUM 4.2 10/21/2020    POTASSIUM 3.6 08/09/2019    CHLORIDE 100 10/21/2020    CHLORIDE 108 08/09/2019    CO2 28 10/21/2020    CO2 26 08/09/2019    BUN 13 10/21/2020    BUN 8 08/09/2019    CR 0.80 10/21/2020    CR 0.72 08/09/2019    GLC 92 10/21/2020    GLC 99 08/09/2019     COAGS: No results found for: PTT, INR, FIBR  POC: No results found for: BGM, HCG, HCGS  HEPATIC:   Lab Results   Component Value Date    ALBUMIN 4.0 10/21/2020    PROTTOTAL 7.5 10/21/2020    ALT 21 10/21/2020    AST 22 10/21/2020    ALKPHOS 48 10/21/2020    BILITOTAL 0.8 10/21/2020     OTHER:   Lab Results   Component Value Date    LD 9.1 10/21/2020    MAG 2.3 10/21/2020    LIPASE 97 10/21/2005    TSH 2.16 10/21/2020    SED 7 07/27/2005              PAC Discussion and Assessment    ASA Classification: 2  Case is suitable for: [facility]  Anesthetic techniques and relevant risks discussed: GA  Invasive monitoring and risk discussed: No    Possibility and Risk of blood transfusion discussed: Yes            PAC Resident/NP Anesthesia Assessment: Mare Dominguez is a 61 year old female scheduled for Decompression and transforaminal lumbar interbody fusion Lumbar 4 to 5 on 9/15/21 by Dr. Romero in treatment of degenerative spondylolisthesis.  PAC referral for risk assessment and optimization for anesthesia:    Pre-operative considerations:  1.  Cardiac:  Functional status- METS >4.  Walks 3-4 miles but is not doing any high intensity exercise due to back pain.  Intermediate risk surgery with 0.4% (RCRI #) risk of major adverse cardiac event.   --denies chest pain, chest pressure, SOB, WILLARD, palpitations, edema  --no known cardiac history    2.  Pulm:  Airway feasible.  CLAUDIA risk: Low  --non smoker    3.  GI:  Risk of PONV score = 3.  If > 2, anti-emetic intervention recommended.  --h/o GERD, controlled with Nexium prn.  May take DOS if needed  --constipation, Miralax daily    VTE risk: 0.5%    Patient is optimized and is acceptable candidate for the proposed procedure.  No further diagnostic evaluation is needed.         **For further details of assessment, testing, and physical exam please see H and P completed on same date.          Kyara Mchugh PA-C, [facility]    Reviewed and Signed by PAC Mid-Level Provider/Resident  Mid-Level Provider/Resident: Kyara Mchugh  Date: 8/20/21      Attending Anesthesiologist Anesthesia Assessment:   Last intubation; CLINT krause 8/19: easy Macgrath 3, 7.0, grade 1 view of cords  Reviewed and Signed by PAC Anesthesiologist  Anesthesiologist: Tanvir  Date: 8/20/21                     Kyara Mchugh PA-C

## 2021-09-05 ENCOUNTER — HEALTH MAINTENANCE LETTER (OUTPATIENT)
Age: 61
End: 2021-09-05

## 2021-09-07 NOTE — TELEPHONE ENCOUNTER
See phone messages from surgery scheduler.   I called pt on 8-26-21 & again reviewed preop teaching & pkt. & answered all her questions.  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    Call back prn. Pt agreed.  Zoey Encarnacion RN.

## 2021-09-07 NOTE — TELEPHONE ENCOUNTER
See phone messages from surgery scheduler.   I called pt on 8-26-21 & again reviewed preop teaching & pkt. & answered all her questions.  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    Call back prn. Pt agreed.  Zoey Encarnacion RN.

## 2021-09-12 ENCOUNTER — LAB (OUTPATIENT)
Dept: FAMILY MEDICINE | Facility: CLINIC | Age: 61
End: 2021-09-12
Payer: COMMERCIAL

## 2021-09-12 DIAGNOSIS — Z11.59 ENCOUNTER FOR SCREENING FOR OTHER VIRAL DISEASES: ICD-10-CM

## 2021-09-12 LAB — SARS-COV-2 RNA RESP QL NAA+PROBE: NEGATIVE

## 2021-09-12 PROCEDURE — U0003 INFECTIOUS AGENT DETECTION BY NUCLEIC ACID (DNA OR RNA); SEVERE ACUTE RESPIRATORY SYNDROME CORONAVIRUS 2 (SARS-COV-2) (CORONAVIRUS DISEASE [COVID-19]), AMPLIFIED PROBE TECHNIQUE, MAKING USE OF HIGH THROUGHPUT TECHNOLOGIES AS DESCRIBED BY CMS-2020-01-R: HCPCS

## 2021-09-12 PROCEDURE — U0005 INFEC AGEN DETEC AMPLI PROBE: HCPCS

## 2021-09-13 ENCOUNTER — HOSPITAL ENCOUNTER (OUTPATIENT)
Dept: MAMMOGRAPHY | Facility: CLINIC | Age: 61
Discharge: HOME | End: 2021-09-13
Attending: FAMILY MEDICINE | Admitting: FAMILY MEDICINE
Payer: COMMERCIAL

## 2021-09-13 DIAGNOSIS — Z12.31 VISIT FOR SCREENING MAMMOGRAM: ICD-10-CM

## 2021-09-13 PROCEDURE — 77063 BREAST TOMOSYNTHESIS BI: CPT

## 2021-09-14 ENCOUNTER — ANESTHESIA EVENT (OUTPATIENT)
Dept: SURGERY | Facility: CLINIC | Age: 61
End: 2021-09-14
Payer: COMMERCIAL

## 2021-09-14 NOTE — ANESTHESIA PREPROCEDURE EVALUATION
Anesthesia Pre-Procedure Evaluation    Patient: Mare Dominguez   MRN: [id number] : 1960        Preoperative Diagnosis: Degenerative spondylolisthesis [M43.10]   Procedure : Procedure(s):  Decompression and transforaminal lumbar interbody fusion Lumbar 4 to 5     Past Medical History:   Diagnosis Date     Basal cell carcinoma      Cervical high risk HPV (human papillomavirus) test positive 2017    Not 16/18     Cervicalgia     , MRI 10/05 c6-c7 sever left foraminal stenosis, s/p epidural injection 10/20/05     Degeneration of lumbar or lumbosacral intervertebral disc     degen disc lumbar      Other and unspecified malignant neoplasm of skin of other and unspecified parts of face     Basal cell cancer right nose     Other and unspecified ovarian cyst     ovarian cyst -Mare thought that her right ovary was removed, but in fact in retrospect it appears that the left has been removed.     S/P lumpectomy of breast 10/22/2001    excision left breast      Past Surgical History:   Procedure Laterality Date     ARTHROSCOPIC RECONSTRUCTION ANTERIOR CRUCIATE LIGAMENT Left 2013     BIOPSY BREAST       COLONOSCOPY  2012    Procedure: COLONOSCOPY;  Colonoscopy;  Surgeon: Norberto Rivera MD;  Location: [facility]     LAPAROSCOPIC APPENDECTOMY N/A 2019    Procedure: APPENDECTOMY, LAPAROSCOPIC;  Surgeon: Paul Rodriguez MD;  Location: [facility]     LUMPECTOMY BREAST       PUNC/ASPIR BREAST CYST      early   [place]     SURGICAL HISTORY OF -   1978    Ovarian cyst laparotomy      SURGICAL HISTORY OF -   1999    Discectomy lumbar     SURGICAL HISTORY OF -   1994    Mohs' excision basal cell carcinoma right nasal root      Allergies   Allergen Reactions     Hydrocodone Nausea and Vomiting     Latex Rash     Topical rash from gloves     Percocet [Oxycodone-Acetaminophen] Nausea and Vomiting     Sulfa Drugs Hives      Social History     Tobacco Use     Smoking status: Never  Smoker     Smokeless tobacco: Never Used   Substance Use Topics     Alcohol use: Yes     Comment: every day, 1-2      Wt Readings from Last 1 Encounters:   08/20/21 73.5 kg (162 lb)        Anesthesia Evaluation   Pt has had prior anesthetic. Type: General and MAC.    No history of anesthetic complications       ROS/MED HX  ENT/Pulmonary:  - neg pulmonary ROS     Neurologic:  - neg neurologic ROS     Cardiovascular:  - neg cardiovascular ROS     METS/Exercise Tolerance: >4 METS    Hematologic:  - neg hematologic  ROS     Musculoskeletal: Comment: H/o lumbar discectomy  H/o left ACL repair      GI/Hepatic:     (+) GERD, Asymptomatic on medication,     Renal/Genitourinary:  - neg Renal ROS     Endo:  - neg endo ROS     Psychiatric/Substance Use:  - neg psychiatric ROS     Infectious Disease:  - neg infectious disease ROS     Malignancy:  - neg malignancy ROS     Other:  - neg other ROS          Physical Exam    Airway  airway exam normal      Mallampati: II   TM distance: > 3 FB   Neck ROM: full   Mouth opening: > 3 cm    Respiratory Devices and Support         Dental  no notable dental history         Cardiovascular   cardiovascular exam normal       Rhythm and rate: regular and normal     Pulmonary           breath sounds clear to auscultation           OUTSIDE LABS:  CBC:   Lab Results   Component Value Date    WBC 4.3 08/20/2021    WBC 14.3 (H) 08/09/2019    HGB 12.8 08/20/2021    HGB 11.7 08/09/2019    HCT 38.3 08/20/2021    HCT 37.3 08/09/2019     08/20/2021     08/09/2019     BMP:   Lab Results   Component Value Date     08/20/2021     10/21/2020    POTASSIUM 4.3 08/20/2021    POTASSIUM 4.2 10/21/2020    CHLORIDE 106 08/20/2021    CHLORIDE 100 10/21/2020    CO2 30 08/20/2021    CO2 28 10/21/2020    BUN 11 08/20/2021    BUN 13 10/21/2020    CR 0.70 08/20/2021    CR 0.80 10/21/2020     (H) 08/20/2021    GLC 92 10/21/2020     COAGS: No results found for: PTT, INR, FIBR  POC: No  results found for: BGM, HCG, HCGS  HEPATIC:   Lab Results   Component Value Date    ALBUMIN 4.0 10/21/2020    PROTTOTAL 7.5 10/21/2020    ALT 21 10/21/2020    AST 22 10/21/2020    ALKPHOS 48 10/21/2020    BILITOTAL 0.8 10/21/2020     OTHER:   Lab Results   Component Value Date    LD 9.3 08/20/2021    MAG 2.3 10/21/2020    LIPASE 97 10/21/2005    TSH 2.16 10/21/2020    SED 7 07/27/2005       Anesthesia Plan    ASA Status:  2      Anesthesia Type: General.     - Airway: ETT   Induction: Intravenous, Propofol.   Maintenance: Balanced.   Techniques and Equipment:     - Lines/Monitors: 2nd IV     Consents         - Extended Intubation/Ventilatory Support Discussed: No.      - Patient is DNR/DNI Status: No    Use of blood products discussed: No .     Postoperative Care    Pain management: IV analgesics, Oral pain medications, Multi-modal analgesia.   PONV prophylaxis: Ondansetron (or other 5HT-3), Dexamethasone or Solumedrol     Comments:    2nd IV          PAC Discussion and Assessment    ASA Classification: 2  Case is suitable for: [facility]  Anesthetic techniques and relevant risks discussed: GA  Invasive monitoring and risk discussed: No    Possibility and Risk of blood transfusion discussed: Yes            PAC Resident/NP Anesthesia Assessment: Mare Dominguez is a 61 year old female scheduled for Decompression and transforaminal lumbar interbody fusion Lumbar 4 to 5 on 9/15/21 by Dr. Romero in treatment of degenerative spondylolisthesis.  PAC referral for risk assessment and optimization for anesthesia:    Pre-operative considerations:  1.  Cardiac:  Functional status- METS >4.  Walks 3-4 miles but is not doing any high intensity exercise due to back pain.  Intermediate risk surgery with 0.4% (RCRI #) risk of major adverse cardiac event.   --denies chest pain, chest pressure, SOB, WILLARD, palpitations, edema  --no known cardiac history    2.  Pulm:  Airway feasible.  CLAUDIA risk: Low  --non smoker    3.  GI:  Risk of  PONV score = 3.  If > 2, anti-emetic intervention recommended.  --h/o GERD, controlled with Nexium prn.  May take DOS if needed  --constipation, Miralax daily    VTE risk: 0.5%    Patient is optimized and is acceptable candidate for the proposed procedure.  No further diagnostic evaluation is needed.         **For further details of assessment, testing, and physical exam please see H and P completed on same date.          Kyara Mchugh PA-C, [facility]    Reviewed and Signed by PAC Mid-Level Provider/Resident  Mid-Level Provider/Resident: Kyara Mchugh  Date: 8/20/21      Attending Anesthesiologist Anesthesia Assessment:   Last intubation; GETA  appy 8/19: easy Macgrath 3, 7.0, grade 1 view of cords  Reviewed and Signed by PAC Anesthesiologist  Anesthesiologist: Tanvir  Date: 8/20/21                     Lawrence Ruvalcaba MD

## 2021-09-15 ENCOUNTER — ANESTHESIA (OUTPATIENT)
Dept: SURGERY | Facility: CLINIC | Age: 61
End: 2021-09-15
Payer: COMMERCIAL

## 2021-09-15 ENCOUNTER — APPOINTMENT (OUTPATIENT)
Dept: GENERAL RADIOLOGY | Facility: CLINIC | Age: 61
End: 2021-09-15
Attending: ORTHOPAEDIC SURGERY
Payer: COMMERCIAL

## 2021-09-15 ENCOUNTER — HOSPITAL ENCOUNTER (INPATIENT)
Facility: CLINIC | Age: 61
LOS: 2 days | Discharge: HOME | End: 2021-09-17
Attending: ORTHOPAEDIC SURGERY | Admitting: ORTHOPAEDIC SURGERY
Payer: COMMERCIAL

## 2021-09-15 DIAGNOSIS — G89.18 ACUTE POST-OPERATIVE PAIN: Primary | ICD-10-CM

## 2021-09-15 DIAGNOSIS — M43.10 DEGENERATIVE SPONDYLOLISTHESIS: ICD-10-CM

## 2021-09-15 LAB — GLUCOSE BLDC GLUCOMTR-MCNC: 107 MG/DL (ref 70–99)

## 2021-09-15 PROCEDURE — 250N000011 HC RX IP 250 OP 636: Performed by: ORTHOPAEDIC SURGERY

## 2021-09-15 PROCEDURE — 272N000001 HC OR GENERAL SUPPLY STERILE: Performed by: ORTHOPAEDIC SURGERY

## 2021-09-15 PROCEDURE — 710N000010 HC RECOVERY PHASE 1, LEVEL 2, PER MIN: Performed by: ORTHOPAEDIC SURGERY

## 2021-09-15 PROCEDURE — 250N000011 HC RX IP 250 OP 636: Performed by: STUDENT IN AN ORGANIZED HEALTH CARE EDUCATION/TRAINING PROGRAM

## 2021-09-15 PROCEDURE — 20936 SP BONE AGRFT LOCAL ADD-ON: CPT | Performed by: ORTHOPAEDIC SURGERY

## 2021-09-15 PROCEDURE — 22633 ARTHRD CMBN 1NTRSPC LUMBAR: CPT | Performed by: ORTHOPAEDIC SURGERY

## 2021-09-15 PROCEDURE — C1713 ANCHOR/SCREW BN/BN,TIS/BN: HCPCS | Performed by: ORTHOPAEDIC SURGERY

## 2021-09-15 PROCEDURE — 258N000003 HC RX IP 258 OP 636: Performed by: PHYSICIAN ASSISTANT

## 2021-09-15 PROCEDURE — 250N000013 HC RX MED GY IP 250 OP 250 PS 637: Performed by: STUDENT IN AN ORGANIZED HEALTH CARE EDUCATION/TRAINING PROGRAM

## 2021-09-15 PROCEDURE — 999N000141 HC STATISTIC PRE-PROCEDURE NURSING ASSESSMENT: Performed by: ORTHOPAEDIC SURGERY

## 2021-09-15 PROCEDURE — 250N000011 HC RX IP 250 OP 636: Performed by: PHYSICIAN ASSISTANT

## 2021-09-15 PROCEDURE — 250N000009 HC RX 250: Performed by: ANESTHESIOLOGY

## 2021-09-15 PROCEDURE — 120N000002 HC R&B MED SURG/OB UMMC

## 2021-09-15 PROCEDURE — 250N000025 HC SEVOFLURANE, PER MIN: Performed by: ORTHOPAEDIC SURGERY

## 2021-09-15 PROCEDURE — 99231 SBSQ HOSP IP/OBS SF/LOW 25: CPT | Performed by: NURSE PRACTITIONER

## 2021-09-15 PROCEDURE — 0SG00AJ FUSION OF LUMBAR VERTEBRAL JOINT WITH INTERBODY FUSION DEVICE, POSTERIOR APPROACH, ANTERIOR COLUMN, OPEN APPROACH: ICD-10-PCS | Performed by: ORTHOPAEDIC SURGERY

## 2021-09-15 PROCEDURE — 22853 INSJ BIOMECHANICAL DEVICE: CPT | Performed by: ORTHOPAEDIC SURGERY

## 2021-09-15 PROCEDURE — 250N000011 HC RX IP 250 OP 636

## 2021-09-15 PROCEDURE — 360N000085 HC SURGERY LEVEL 5 W/ FLUORO, PER MIN: Performed by: ORTHOPAEDIC SURGERY

## 2021-09-15 PROCEDURE — 250N000009 HC RX 250: Performed by: PHYSICIAN ASSISTANT

## 2021-09-15 PROCEDURE — 3E0R3NZ INTRODUCTION OF ANALGESICS, HYPNOTICS, SEDATIVES INTO SPINAL CANAL, PERCUTANEOUS APPROACH: ICD-10-PCS | Performed by: ORTHOPAEDIC SURGERY

## 2021-09-15 PROCEDURE — 370N000017 HC ANESTHESIA TECHNICAL FEE, PER MIN: Performed by: ORTHOPAEDIC SURGERY

## 2021-09-15 PROCEDURE — 278N000051 HC OR IMPLANT GENERAL: Performed by: ORTHOPAEDIC SURGERY

## 2021-09-15 PROCEDURE — C1762 CONN TISS, HUMAN(INC FASCIA): HCPCS | Performed by: ORTHOPAEDIC SURGERY

## 2021-09-15 PROCEDURE — 250N000013 HC RX MED GY IP 250 OP 250 PS 637: Performed by: PHYSICIAN ASSISTANT

## 2021-09-15 PROCEDURE — 999N000180 XR SURGERY CARM FLUORO LESS THAN 5 MIN: Mod: TC

## 2021-09-15 PROCEDURE — 01NB0ZZ RELEASE LUMBAR NERVE, OPEN APPROACH: ICD-10-PCS | Performed by: ORTHOPAEDIC SURGERY

## 2021-09-15 PROCEDURE — 258N000003 HC RX IP 258 OP 636

## 2021-09-15 PROCEDURE — 272N000004 HC RX 272: Performed by: ORTHOPAEDIC SURGERY

## 2021-09-15 PROCEDURE — 258N000003 HC RX IP 258 OP 636: Performed by: NURSE ANESTHETIST, CERTIFIED REGISTERED

## 2021-09-15 PROCEDURE — 99207 PR CONSULT E&M CHANGED TO SUBSEQUENT LEVEL: CPT | Performed by: NURSE PRACTITIONER

## 2021-09-15 PROCEDURE — 20930 SP BONE ALGRFT MORSEL ADD-ON: CPT | Performed by: ORTHOPAEDIC SURGERY

## 2021-09-15 PROCEDURE — 0ST20ZZ RESECTION OF LUMBAR VERTEBRAL DISC, OPEN APPROACH: ICD-10-PCS | Performed by: ORTHOPAEDIC SURGERY

## 2021-09-15 PROCEDURE — 250N000011 HC RX IP 250 OP 636: Performed by: ANESTHESIOLOGY

## 2021-09-15 PROCEDURE — 272N000002 HC OR SUPPLY OTHER OPNP: Performed by: ORTHOPAEDIC SURGERY

## 2021-09-15 PROCEDURE — 22840 INSERT SPINE FIXATION DEVICE: CPT | Performed by: ORTHOPAEDIC SURGERY

## 2021-09-15 PROCEDURE — 8E0WXBF COMPUTER ASSISTED PROCEDURE OF TRUNK REGION, WITH FLUOROSCOPY: ICD-10-PCS | Performed by: ORTHOPAEDIC SURGERY

## 2021-09-15 PROCEDURE — 250N000011 HC RX IP 250 OP 636: Performed by: NURSE ANESTHETIST, CERTIFIED REGISTERED

## 2021-09-15 PROCEDURE — 0SG0071 FUSION OF LUMBAR VERTEBRAL JOINT WITH AUTOLOGOUS TISSUE SUBSTITUTE, POSTERIOR APPROACH, POSTERIOR COLUMN, OPEN APPROACH: ICD-10-PCS | Performed by: ORTHOPAEDIC SURGERY

## 2021-09-15 PROCEDURE — 22214 INCIS 1 VERTEBRAL SEG LUMBAR: CPT | Mod: 51 | Performed by: ORTHOPAEDIC SURGERY

## 2021-09-15 PROCEDURE — 250N000009 HC RX 250: Performed by: ORTHOPAEDIC SURGERY

## 2021-09-15 PROCEDURE — 61783 SCAN PROC SPINAL: CPT | Performed by: ORTHOPAEDIC SURGERY

## 2021-09-15 DEVICE — IMP SCR MEDT 5.5/6.0MM SOLERA 7.5X45MM MA 55840007545: Type: IMPLANTABLE DEVICE | Site: SPINE LUMBAR | Status: FUNCTIONAL

## 2021-09-15 DEVICE — SPACER 4021322 18 DEG 13X22
Type: IMPLANTABLE DEVICE | Site: SPINE LUMBAR | Status: FUNCTIONAL
Brand: CAPSTONE CONTROL™ SPINAL SYSTEM

## 2021-09-15 DEVICE — IMP ROD MEDT SOLERA CVD 5.5X35MM CHR 1555501035: Type: IMPLANTABLE DEVICE | Site: SPINE LUMBAR | Status: FUNCTIONAL

## 2021-09-15 DEVICE — IMP SCR MEDT 5.5/6.0MM SOLERA 6.5X45MM MA 55840006545: Type: IMPLANTABLE DEVICE | Site: SPINE LUMBAR | Status: FUNCTIONAL

## 2021-09-15 DEVICE — GRAFT BONE CRUSH CANC 30ML 400080: Type: IMPLANTABLE DEVICE | Site: SPINE LUMBAR | Status: FUNCTIONAL

## 2021-09-15 DEVICE — IMP SCR SET MEDT SOLERA BREAK OFF 5.5MM TI 5540030: Type: IMPLANTABLE DEVICE | Site: SPINE LUMBAR | Status: FUNCTIONAL

## 2021-09-15 DEVICE — IMP SCR MEDT 5.5/6.0MM SOLERA 6.5X50MM MA 55840006550: Type: IMPLANTABLE DEVICE | Site: SPINE LUMBAR | Status: FUNCTIONAL

## 2021-09-15 RX ORDER — PROCHLORPERAZINE MALEATE 5 MG
10 TABLET ORAL EVERY 6 HOURS PRN
Status: DISCONTINUED | OUTPATIENT
Start: 2021-09-15 | End: 2021-09-17 | Stop reason: HOSPADM

## 2021-09-15 RX ORDER — CEFAZOLIN SODIUM 2 G/100ML
2 INJECTION, SOLUTION INTRAVENOUS SEE ADMIN INSTRUCTIONS
Status: DISCONTINUED | OUTPATIENT
Start: 2021-09-15 | End: 2021-09-15 | Stop reason: HOSPADM

## 2021-09-15 RX ORDER — AMOXICILLIN 250 MG
1 CAPSULE ORAL 2 TIMES DAILY
Status: DISCONTINUED | OUTPATIENT
Start: 2021-09-15 | End: 2021-09-17 | Stop reason: HOSPADM

## 2021-09-15 RX ORDER — FENTANYL CITRATE 50 UG/ML
25 INJECTION, SOLUTION INTRAMUSCULAR; INTRAVENOUS EVERY 5 MIN PRN
Status: DISCONTINUED | OUTPATIENT
Start: 2021-09-15 | End: 2021-09-15 | Stop reason: HOSPADM

## 2021-09-15 RX ORDER — SODIUM CHLORIDE, SODIUM LACTATE, POTASSIUM CHLORIDE, CALCIUM CHLORIDE 600; 310; 30; 20 MG/100ML; MG/100ML; MG/100ML; MG/100ML
INJECTION, SOLUTION INTRAVENOUS CONTINUOUS
Status: DISCONTINUED | OUTPATIENT
Start: 2021-09-15 | End: 2021-09-15 | Stop reason: HOSPADM

## 2021-09-15 RX ORDER — ONDANSETRON 2 MG/ML
4 INJECTION INTRAMUSCULAR; INTRAVENOUS EVERY 4 HOURS PRN
Status: DISCONTINUED | OUTPATIENT
Start: 2021-09-15 | End: 2021-09-15

## 2021-09-15 RX ORDER — HYDROMORPHONE HYDROCHLORIDE 1 MG/ML
0.2 INJECTION, SOLUTION INTRAMUSCULAR; INTRAVENOUS; SUBCUTANEOUS EVERY 5 MIN PRN
Status: DISCONTINUED | OUTPATIENT
Start: 2021-09-15 | End: 2021-09-15 | Stop reason: HOSPADM

## 2021-09-15 RX ORDER — EPHEDRINE SULFATE 50 MG/ML
INJECTION, SOLUTION INTRAMUSCULAR; INTRAVENOUS; SUBCUTANEOUS PRN
Status: DISCONTINUED | OUTPATIENT
Start: 2021-09-15 | End: 2021-09-15

## 2021-09-15 RX ORDER — POLYETHYLENE GLYCOL 3350 17 G/17G
17 POWDER, FOR SOLUTION ORAL DAILY
Status: DISCONTINUED | OUTPATIENT
Start: 2021-09-15 | End: 2021-09-15

## 2021-09-15 RX ORDER — MEPERIDINE HYDROCHLORIDE 25 MG/ML
12.5 INJECTION INTRAMUSCULAR; INTRAVENOUS; SUBCUTANEOUS
Status: DISCONTINUED | OUTPATIENT
Start: 2021-09-15 | End: 2021-09-15 | Stop reason: HOSPADM

## 2021-09-15 RX ORDER — ACETAMINOPHEN 325 MG/1
650 TABLET ORAL EVERY 4 HOURS PRN
Status: DISCONTINUED | OUTPATIENT
Start: 2021-09-18 | End: 2021-09-17 | Stop reason: HOSPADM

## 2021-09-15 RX ORDER — NALOXONE HYDROCHLORIDE 0.4 MG/ML
0.2 INJECTION, SOLUTION INTRAMUSCULAR; INTRAVENOUS; SUBCUTANEOUS
Status: DISCONTINUED | OUTPATIENT
Start: 2021-09-15 | End: 2021-09-17 | Stop reason: HOSPADM

## 2021-09-15 RX ORDER — SODIUM CHLORIDE, SODIUM LACTATE, POTASSIUM CHLORIDE, CALCIUM CHLORIDE 600; 310; 30; 20 MG/100ML; MG/100ML; MG/100ML; MG/100ML
INJECTION, SOLUTION INTRAVENOUS CONTINUOUS PRN
Status: DISCONTINUED | OUTPATIENT
Start: 2021-09-15 | End: 2021-09-15

## 2021-09-15 RX ORDER — HYDROMORPHONE HCL IN WATER/PF 6 MG/30 ML
0.2 PATIENT CONTROLLED ANALGESIA SYRINGE INTRAVENOUS
Status: DISCONTINUED | OUTPATIENT
Start: 2021-09-15 | End: 2021-09-17

## 2021-09-15 RX ORDER — BISACODYL 10 MG
10 SUPPOSITORY, RECTAL RECTAL DAILY PRN
Status: DISCONTINUED | OUTPATIENT
Start: 2021-09-15 | End: 2021-09-17 | Stop reason: HOSPADM

## 2021-09-15 RX ORDER — ONDANSETRON 2 MG/ML
INJECTION INTRAMUSCULAR; INTRAVENOUS PRN
Status: DISCONTINUED | OUTPATIENT
Start: 2021-09-15 | End: 2021-09-15

## 2021-09-15 RX ORDER — GABAPENTIN 300 MG/1
300 CAPSULE ORAL
Status: COMPLETED | OUTPATIENT
Start: 2021-09-15 | End: 2021-09-15

## 2021-09-15 RX ORDER — PANTOPRAZOLE SODIUM 20 MG/1
20 TABLET, DELAYED RELEASE ORAL DAILY PRN
Status: DISCONTINUED | OUTPATIENT
Start: 2021-09-15 | End: 2021-09-17 | Stop reason: HOSPADM

## 2021-09-15 RX ORDER — DEXAMETHASONE SODIUM PHOSPHATE 4 MG/ML
INJECTION, SOLUTION INTRA-ARTICULAR; INTRALESIONAL; INTRAMUSCULAR; INTRAVENOUS; SOFT TISSUE PRN
Status: DISCONTINUED | OUTPATIENT
Start: 2021-09-15 | End: 2021-09-15

## 2021-09-15 RX ORDER — ACETAMINOPHEN 325 MG/1
975 TABLET ORAL EVERY 8 HOURS
Status: DISCONTINUED | OUTPATIENT
Start: 2021-09-15 | End: 2021-09-17 | Stop reason: HOSPADM

## 2021-09-15 RX ORDER — HYDROMORPHONE HCL IN WATER/PF 6 MG/30 ML
0.4 PATIENT CONTROLLED ANALGESIA SYRINGE INTRAVENOUS
Status: DISCONTINUED | OUTPATIENT
Start: 2021-09-15 | End: 2021-09-17

## 2021-09-15 RX ORDER — POLYETHYLENE GLYCOL 3350 17 G/17G
17 POWDER, FOR SOLUTION ORAL DAILY
Status: DISCONTINUED | OUTPATIENT
Start: 2021-09-16 | End: 2021-09-17 | Stop reason: HOSPADM

## 2021-09-15 RX ORDER — CEFAZOLIN SODIUM 2 G/100ML
2 INJECTION, SOLUTION INTRAVENOUS
Status: COMPLETED | OUTPATIENT
Start: 2021-09-15 | End: 2021-09-15

## 2021-09-15 RX ORDER — HYDROMORPHONE HYDROCHLORIDE 2 MG/1
2 TABLET ORAL EVERY 4 HOURS PRN
Status: DISCONTINUED | OUTPATIENT
Start: 2021-09-15 | End: 2021-09-17 | Stop reason: HOSPADM

## 2021-09-15 RX ORDER — LIDOCAINE HYDROCHLORIDE ANHYDROUS AND DEXTROSE MONOHYDRATE .8; 5 G/100ML; G/100ML
1 INJECTION, SOLUTION INTRAVENOUS CONTINUOUS
Status: CANCELLED | OUTPATIENT
Start: 2021-09-15

## 2021-09-15 RX ORDER — ALBUTEROL SULFATE 0.83 MG/ML
2.5 SOLUTION RESPIRATORY (INHALATION) EVERY 4 HOURS PRN
Status: DISCONTINUED | OUTPATIENT
Start: 2021-09-15 | End: 2021-09-15 | Stop reason: HOSPADM

## 2021-09-15 RX ORDER — LIDOCAINE HYDROCHLORIDE ANHYDROUS AND DEXTROSE MONOHYDRATE .8; 5 G/100ML; G/100ML
1 INJECTION, SOLUTION INTRAVENOUS CONTINUOUS
Status: DISCONTINUED | OUTPATIENT
Start: 2021-09-15 | End: 2021-09-15 | Stop reason: HOSPADM

## 2021-09-15 RX ORDER — ACETAMINOPHEN 325 MG/1
975 TABLET ORAL ONCE
Status: COMPLETED | OUTPATIENT
Start: 2021-09-15 | End: 2021-09-15

## 2021-09-15 RX ORDER — LORAZEPAM 2 MG/ML
.5-1 INJECTION INTRAMUSCULAR
Status: DISCONTINUED | OUTPATIENT
Start: 2021-09-15 | End: 2021-09-15 | Stop reason: HOSPADM

## 2021-09-15 RX ORDER — ONDANSETRON 2 MG/ML
4 INJECTION INTRAMUSCULAR; INTRAVENOUS EVERY 30 MIN PRN
Status: DISCONTINUED | OUTPATIENT
Start: 2021-09-15 | End: 2021-09-15 | Stop reason: HOSPADM

## 2021-09-15 RX ORDER — ONDANSETRON 4 MG/1
4 TABLET, ORALLY DISINTEGRATING ORAL EVERY 30 MIN PRN
Status: DISCONTINUED | OUTPATIENT
Start: 2021-09-15 | End: 2021-09-15 | Stop reason: HOSPADM

## 2021-09-15 RX ORDER — FENTANYL CITRATE 50 UG/ML
INJECTION, SOLUTION INTRAMUSCULAR; INTRAVENOUS PRN
Status: DISCONTINUED | OUTPATIENT
Start: 2021-09-15 | End: 2021-09-15

## 2021-09-15 RX ORDER — LIDOCAINE HYDROCHLORIDE 20 MG/ML
INJECTION, SOLUTION INFILTRATION; PERINEURAL PRN
Status: DISCONTINUED | OUTPATIENT
Start: 2021-09-15 | End: 2021-09-15

## 2021-09-15 RX ORDER — NALBUPHINE HYDROCHLORIDE 10 MG/ML
2.5-5 INJECTION, SOLUTION INTRAMUSCULAR; INTRAVENOUS; SUBCUTANEOUS EVERY 6 HOURS PRN
Status: DISCONTINUED | OUTPATIENT
Start: 2021-09-15 | End: 2021-09-17 | Stop reason: HOSPADM

## 2021-09-15 RX ORDER — SCOLOPAMINE TRANSDERMAL SYSTEM 1 MG/1
1 PATCH, EXTENDED RELEASE TRANSDERMAL ONCE
Status: COMPLETED | OUTPATIENT
Start: 2021-09-15 | End: 2021-09-16

## 2021-09-15 RX ORDER — HYDROMORPHONE HYDROCHLORIDE 2 MG/1
4 TABLET ORAL EVERY 4 HOURS PRN
Status: DISCONTINUED | OUTPATIENT
Start: 2021-09-15 | End: 2021-09-17 | Stop reason: HOSPADM

## 2021-09-15 RX ORDER — CALCIUM CARBONATE 500 MG/1
500 TABLET, CHEWABLE ORAL 4 TIMES DAILY PRN
Status: DISCONTINUED | OUTPATIENT
Start: 2021-09-15 | End: 2021-09-17 | Stop reason: HOSPADM

## 2021-09-15 RX ORDER — NALOXONE HYDROCHLORIDE 0.4 MG/ML
0.4 INJECTION, SOLUTION INTRAMUSCULAR; INTRAVENOUS; SUBCUTANEOUS
Status: DISCONTINUED | OUTPATIENT
Start: 2021-09-15 | End: 2021-09-17 | Stop reason: HOSPADM

## 2021-09-15 RX ORDER — HYDRALAZINE HYDROCHLORIDE 20 MG/ML
2.5-5 INJECTION INTRAMUSCULAR; INTRAVENOUS EVERY 10 MIN PRN
Status: DISCONTINUED | OUTPATIENT
Start: 2021-09-15 | End: 2021-09-15 | Stop reason: HOSPADM

## 2021-09-15 RX ORDER — OXYCODONE HYDROCHLORIDE 5 MG/1
5-10 TABLET ORAL EVERY 4 HOURS PRN
Status: DISCONTINUED | OUTPATIENT
Start: 2021-09-15 | End: 2021-09-16

## 2021-09-15 RX ORDER — LIDOCAINE 40 MG/G
CREAM TOPICAL
Status: DISCONTINUED | OUTPATIENT
Start: 2021-09-15 | End: 2021-09-17 | Stop reason: HOSPADM

## 2021-09-15 RX ORDER — HYDROXYZINE HYDROCHLORIDE 25 MG/1
25 TABLET, FILM COATED ORAL EVERY 6 HOURS PRN
Status: DISCONTINUED | OUTPATIENT
Start: 2021-09-15 | End: 2021-09-17 | Stop reason: HOSPADM

## 2021-09-15 RX ORDER — MORPHINE SULFATE 1 MG/ML
INJECTION, SOLUTION EPIDURAL; INTRATHECAL; INTRAVENOUS PRN
Status: DISCONTINUED | OUTPATIENT
Start: 2021-09-15 | End: 2021-09-15 | Stop reason: HOSPADM

## 2021-09-15 RX ORDER — MAGNESIUM HYDROXIDE 1200 MG/15ML
LIQUID ORAL PRN
Status: DISCONTINUED | OUTPATIENT
Start: 2021-09-15 | End: 2021-09-15 | Stop reason: HOSPADM

## 2021-09-15 RX ORDER — ONDANSETRON 2 MG/ML
4 INJECTION INTRAMUSCULAR; INTRAVENOUS EVERY 6 HOURS PRN
Status: DISCONTINUED | OUTPATIENT
Start: 2021-09-15 | End: 2021-09-17 | Stop reason: HOSPADM

## 2021-09-15 RX ORDER — METHOCARBAMOL 750 MG/1
750 TABLET, FILM COATED ORAL EVERY 6 HOURS PRN
Status: DISCONTINUED | OUTPATIENT
Start: 2021-09-15 | End: 2021-09-17 | Stop reason: HOSPADM

## 2021-09-15 RX ORDER — PROPOFOL 10 MG/ML
INJECTION, EMULSION INTRAVENOUS PRN
Status: DISCONTINUED | OUTPATIENT
Start: 2021-09-15 | End: 2021-09-15

## 2021-09-15 RX ORDER — ACETAMINOPHEN 325 MG/1
975 TABLET ORAL ONCE
Status: DISCONTINUED | OUTPATIENT
Start: 2021-09-15 | End: 2021-09-15 | Stop reason: HOSPADM

## 2021-09-15 RX ORDER — CEFAZOLIN SODIUM 1 G/3ML
1 INJECTION, POWDER, FOR SOLUTION INTRAMUSCULAR; INTRAVENOUS EVERY 8 HOURS
Status: COMPLETED | OUTPATIENT
Start: 2021-09-15 | End: 2021-09-16

## 2021-09-15 RX ORDER — LIDOCAINE 40 MG/G
CREAM TOPICAL
Status: DISCONTINUED | OUTPATIENT
Start: 2021-09-15 | End: 2021-09-15 | Stop reason: HOSPADM

## 2021-09-15 RX ORDER — LABETALOL HYDROCHLORIDE 5 MG/ML
10 INJECTION, SOLUTION INTRAVENOUS
Status: DISCONTINUED | OUTPATIENT
Start: 2021-09-15 | End: 2021-09-15 | Stop reason: HOSPADM

## 2021-09-15 RX ORDER — KETOROLAC TROMETHAMINE 30 MG/ML
15 INJECTION, SOLUTION INTRAMUSCULAR; INTRAVENOUS EVERY 6 HOURS PRN
Status: DISCONTINUED | OUTPATIENT
Start: 2021-09-15 | End: 2021-09-15 | Stop reason: HOSPADM

## 2021-09-15 RX ORDER — ONDANSETRON 4 MG/1
4 TABLET, ORALLY DISINTEGRATING ORAL EVERY 6 HOURS PRN
Status: DISCONTINUED | OUTPATIENT
Start: 2021-09-15 | End: 2021-09-17 | Stop reason: HOSPADM

## 2021-09-15 RX ADMIN — PHENYLEPHRINE HYDROCHLORIDE 100 MCG: 10 INJECTION INTRAVENOUS at 08:47

## 2021-09-15 RX ADMIN — Medication 5 MG: at 08:44

## 2021-09-15 RX ADMIN — HYDROMORPHONE HYDROCHLORIDE 0.5 MG: 1 INJECTION, SOLUTION INTRAMUSCULAR; INTRAVENOUS; SUBCUTANEOUS at 08:13

## 2021-09-15 RX ADMIN — ROCURONIUM BROMIDE 30 MG: 10 INJECTION INTRAVENOUS at 08:52

## 2021-09-15 RX ADMIN — PHENYLEPHRINE HYDROCHLORIDE 100 MCG: 10 INJECTION INTRAVENOUS at 09:21

## 2021-09-15 RX ADMIN — DOCUSATE SODIUM AND SENNOSIDES 1 TABLET: 8.6; 5 TABLET ORAL at 19:56

## 2021-09-15 RX ADMIN — FENTANYL CITRATE 100 MCG: 50 INJECTION, SOLUTION INTRAMUSCULAR; INTRAVENOUS at 07:36

## 2021-09-15 RX ADMIN — HYDROMORPHONE HYDROCHLORIDE 0.25 MG: 1 INJECTION, SOLUTION INTRAMUSCULAR; INTRAVENOUS; SUBCUTANEOUS at 10:36

## 2021-09-15 RX ADMIN — PHENYLEPHRINE HYDROCHLORIDE 0.2 MCG/KG/MIN: 10 INJECTION INTRAVENOUS at 08:44

## 2021-09-15 RX ADMIN — DEXAMETHASONE SODIUM PHOSPHATE 8 MG: 4 INJECTION, SOLUTION INTRAMUSCULAR; INTRAVENOUS at 08:22

## 2021-09-15 RX ADMIN — SUGAMMADEX 150 MG: 100 INJECTION, SOLUTION INTRAVENOUS at 10:27

## 2021-09-15 RX ADMIN — HYDROMORPHONE HYDROCHLORIDE 0.2 MG: 1 INJECTION, SOLUTION INTRAMUSCULAR; INTRAVENOUS; SUBCUTANEOUS at 11:54

## 2021-09-15 RX ADMIN — PROPOFOL 140 MG: 10 INJECTION, EMULSION INTRAVENOUS at 07:36

## 2021-09-15 RX ADMIN — ACETAMINOPHEN 975 MG: 325 TABLET, FILM COATED ORAL at 06:18

## 2021-09-15 RX ADMIN — HYDROMORPHONE HYDROCHLORIDE 0.25 MG: 1 INJECTION, SOLUTION INTRAMUSCULAR; INTRAVENOUS; SUBCUTANEOUS at 10:11

## 2021-09-15 RX ADMIN — FENTANYL CITRATE 50 MCG: 50 INJECTION, SOLUTION INTRAMUSCULAR; INTRAVENOUS at 10:36

## 2021-09-15 RX ADMIN — FENTANYL CITRATE 25 MCG: 50 INJECTION INTRAMUSCULAR; INTRAVENOUS at 11:09

## 2021-09-15 RX ADMIN — HYDROMORPHONE HYDROCHLORIDE 0.2 MG: 1 INJECTION, SOLUTION INTRAMUSCULAR; INTRAVENOUS; SUBCUTANEOUS at 11:49

## 2021-09-15 RX ADMIN — ONDANSETRON 4 MG: 2 INJECTION INTRAMUSCULAR; INTRAVENOUS at 10:55

## 2021-09-15 RX ADMIN — FENTANYL CITRATE 25 MCG: 50 INJECTION INTRAMUSCULAR; INTRAVENOUS at 10:59

## 2021-09-15 RX ADMIN — PROCHLORPERAZINE EDISYLATE 5 MG: 5 INJECTION INTRAMUSCULAR; INTRAVENOUS at 11:20

## 2021-09-15 RX ADMIN — Medication 5 MG: at 08:25

## 2021-09-15 RX ADMIN — Medication 5 MG: at 07:36

## 2021-09-15 RX ADMIN — PHENYLEPHRINE HYDROCHLORIDE 50 MCG: 10 INJECTION INTRAVENOUS at 09:06

## 2021-09-15 RX ADMIN — LIDOCAINE HYDROCHLORIDE 80 MG: 20 INJECTION, SOLUTION INFILTRATION; PERINEURAL at 07:36

## 2021-09-15 RX ADMIN — TRANEXAMIC ACID 10 MG/KG/HR: 100 INJECTION, SOLUTION INTRAVENOUS at 08:01

## 2021-09-15 RX ADMIN — SODIUM CHLORIDE, POTASSIUM CHLORIDE, SODIUM LACTATE AND CALCIUM CHLORIDE: 600; 310; 30; 20 INJECTION, SOLUTION INTRAVENOUS at 07:29

## 2021-09-15 RX ADMIN — CEFAZOLIN 1 G: 1 INJECTION, POWDER, FOR SOLUTION INTRAMUSCULAR; INTRAVENOUS at 17:54

## 2021-09-15 RX ADMIN — SODIUM CHLORIDE, SODIUM LACTATE, POTASSIUM CHLORIDE, CALCIUM CHLORIDE: 600; 310; 30; 20 INJECTION, SOLUTION INTRAVENOUS at 07:50

## 2021-09-15 RX ADMIN — MIDAZOLAM 2 MG: 1 INJECTION INTRAMUSCULAR; INTRAVENOUS at 07:28

## 2021-09-15 RX ADMIN — SCOPALAMINE 1 PATCH: 1 PATCH, EXTENDED RELEASE TRANSDERMAL at 17:51

## 2021-09-15 RX ADMIN — GABAPENTIN 300 MG: 300 CAPSULE ORAL at 06:18

## 2021-09-15 RX ADMIN — ONDANSETRON 4 MG: 2 INJECTION INTRAMUSCULAR; INTRAVENOUS at 18:18

## 2021-09-15 RX ADMIN — Medication 2 G: at 07:58

## 2021-09-15 RX ADMIN — ACETAMINOPHEN 975 MG: 325 TABLET, FILM COATED ORAL at 17:53

## 2021-09-15 RX ADMIN — TRANEXAMIC ACID 2205 MG: 100 INJECTION, SOLUTION INTRAVENOUS at 07:45

## 2021-09-15 RX ADMIN — ONDANSETRON 4 MG: 2 INJECTION INTRAMUSCULAR; INTRAVENOUS at 10:07

## 2021-09-15 RX ADMIN — ROCURONIUM BROMIDE 50 MG: 10 INJECTION INTRAVENOUS at 07:37

## 2021-09-15 RX ADMIN — Medication 1 MG/KG/HR: at 07:53

## 2021-09-15 RX ADMIN — Medication 5 MG: at 09:20

## 2021-09-15 ASSESSMENT — MIFFLIN-ST. JEOR: SCORE: 1324.75

## 2021-09-15 NOTE — ANESTHESIA POSTPROCEDURE EVALUATION
Patient: Mare Dominguez    Procedure(s):  Decompression and transforaminal lumbar interbody fusion Lumbar 4 to 5    Diagnosis:Degenerative spondylolisthesis [M43.10]  Diagnosis Additional Information: No value filed.    Anesthesia Type:  General    Note:     Postop Pain Control: Uneventful            Sign Out: Well controlled pain   PONV: No   Neuro/Psych: Uneventful            Sign Out: Acceptable/Baseline neuro status   Airway/Respiratory: Uneventful            Sign Out: Acceptable/Baseline resp. status   CV/Hemodynamics: Uneventful            Sign Out: Acceptable CV status; No obvious hypovolemia; No obvious fluid overload   Other NRE: NONE   DID A NON-ROUTINE EVENT OCCUR? No           Last vitals:  Vitals Value Taken Time   /65 09/15/21 1145   Temp 36.4  C (97.5  F) 09/15/21 1130   Pulse 63 09/15/21 1158   Resp 7 09/15/21 1158   SpO2 98 % 09/15/21 1158   Vitals shown include unvalidated device data.    Electronically Signed By: Tung Baer DO  September 15, 2021  11:59 AM

## 2021-09-15 NOTE — BRIEF OP NOTE
Brief Operative Note    Preop Dx:   Degenerative spondylolisthesis [M43.10]  Post op Dx:   Same  Procedure:    Procedure(s):  Decompression and transforaminal lumbar interbody fusion Lumbar 4 to 5  Surgeon:     Paul Romero MD   Assistants:    Olivia Strnage PA-C  Anesthesia:   General  EBL:    210mL with 0mL returned via cellsaver  Total IV Fluids:  See Anesthesia Record  Specimens:   None  Findings:   See Operative Dictation  Complications:  None .    Assessment and Plan: Mare Dominguez is a 61 year old female with PMH including basal cell CA,  GERD, spondylolisthesis with neurogenic claudication now s/p above procedure on 9/15/21 with Dr. Romero.     Heather Primary  Activity:   - Up with assist until independent. No excessive bending or twisting. No lifting >10 lbs x 6 weeks.   Weight bearing status: WBAT.  Pain management:   - 0.25mg intrathecal morphine administered at L3-4 at 1000 on 9/15/21  - IV lidocaine: discontinued at end of case  - Transition from IV to PO narcotics as tolerated. No NSAIDs x 3 months.   Antibiotics: Ancef x 24 hours.  Diet: Begin with clear fluids and progress diet as tolerated.   DVT prophylaxis: SCDs only. No chemical DVT ppx needed.  Imaging: XR Upright lumbar PTDC - ordered.  Labs: Hgb POD#1.  Bracing/Splinting: None.  Dressings: Keep Aquacel c/d/i x 7 days.  Drains: HV superficial. Document output per shift, will be discontinued at Orthopedic Surgery discretion.  Yu catheter: Remove POD#2 (intrathecal morphine).  Physical Therapy/Occupational Therapy: Eval and treat.  Cultures: none.    Consults: Hospitalist.  Follow-up: Clinic with Dr. Romero in 6 weeks with repeat x-rays.   Disposition: Pending progress with therapies, pain control on orals, and medical stability, anticipate discharge to home on POD #3-5.        The procedure was medically necessary for an assistant. My assistance was necessary for patient positioning, prepping and draping, soft tissue retraction, graft  preparation and placement, and closure. The assistance that I provided reduced operative time which meant less general anesthetic for the patient.    Olivia Strange PA-C  Orthopedic Spine Surgery    Thank you for allowing me to participate in this patient's care. Please page me directly any questions/concerns.   Securely message with the Vocera Web Console (learn more here)  Text page via [employer] Paging/Directory    If there is no response, if it is a weekend, or if it is during evening hours, please page the orthopaedic surgery resident on call via [facility] Paging/Directory    Implants:   Implant Name Type Inv. Item Serial No.  Lot No. LRB No. Used Action   IMP SCR SET MEDT SOLERA BREAK OFF 5.5MM TI [id number] - [id number] Metallic Hardware/[place] IMP SCR SET MEDT SOLERA BREAK OFF 5.5MM TI [id number]  [employer]  N/A 4 Implanted   IMP SCR MEDT 5.5/6.0MM SOLERA 7.5X45MM MA [id number] - [id number] Metallic Hardware/[place] IMP SCR MEDT 5.5/6.0MM SOLERA 7.5X45MM MA [id number]  [employer]  N/A 2 Implanted   IMP SCR MEDT 5.5/6.0MM SOLERA 6.5X50MM MA [id number] - [id number] Metallic Hardware/[place] IMP SCR MEDT 5.5/6.0MM SOLERA 6.5X50MM MA [id number]  [employer]  N/A 1 Implanted   IMP SCR MEDT 5.5/6.0MM SOLERA 6.5X45MM MA [id number] - [id number] Metallic Hardware/[place] IMP SCR MEDT 5.5/6.0MM SOLERA 6.5X45MM MA [id number]  [employer]  N/A 1 Implanted   IMP SPACER MEDT CAPSTONE CONTROL [id number] 18DEG [id number] - [id number] Metallic Hardware/[place] IMP SPACER MEDT CAPSTONE CONTROL [id number] 18DEG [id number]  [employer] [id number] N/A 2 Implanted   GRAFT BONE CRUSH CANC 30ML [id number] - [id number] Bone/Tissue/Biologic GRAFT BONE CRUSH CANC 30ML [id number] MUSCULOSKELETAL MCALLISTER  N/A 1 Implanted   IMP KYM MEDT SOLERA CVD 5.5X35MM [facility] [id number] - [id number] Metallic Hardware/[place] IMP KYM MEDT SOLERA CVD 5.5X35MM CHR [id number]  [employer]  N/A 2 Implanted

## 2021-09-15 NOTE — OP NOTE
Procedure Date: 09/15/2021    PREOPERATIVE DIAGNOSES:    1.  Degenerative spondylolisthesis with spinal stenosis and instability.  2.  Sagittal flat back syndrome.    POSTOPERATIVE DIAGNOSES:    1.  Degenerative spondylolisthesis with spinal stenosis and instability.  2.  Sagittal flat back syndrome.    PROCEDURES PERFORMED:     1.  Posterior spinal fusion, L4-5.  2.  Instrumentation, L4-5.  3.  Smith-Davalos osteotomy at L4-5.  4.  Foraminal decompression bilaterally along with lateral recess at L4-5.  5.  Transforaminal lumbar interbody fusion, L4-5.  6.  Insertion of intervertebral device, L4-5.  7.  Image-guided surgery.    SURGEON:  Paul Romero MD    ASSISTANT:  Olivia Strange PA-C.  No qualified resident was available.  She was necessary for assistance with positioning, exposure, instrumentation, and closure.    INDICATIONS FOR PROCEDURE:  The patient is a 61-year-old female who has had back and radicular symptoms, right greater than left, for several years, worsening since the springtime with radiating pains.  She has essentially unlimited sitting, but she has pain with walking, which radiates into a sciatic nerve distribution.  Imaging showed that she had a degenerative spondylolisthesis, which had significant mobility on flexion and extension.  She had a high fluid signal within the facet joints at L4-5 and significant central and lateral recess stenosis with marked facet arthropathy.  Her lordosis distribution index was significantly decreased as she has had a history in the past of a lumbar diskectomy.    She has been counseled for surgery by someone else and saw me as a second opinion.  My recommendation was that this would be best treated surgically attempting to restore a more normal sagittal contour as well as decompressing her.    DESCRIPTION OF PROCEDURE:  The OR team was briefed about the plan.  The patient was brought to the operating room and underwent the induction of adequate general  anesthesia, had a Yu catheter placed and was turned and positioned prone on the ProAxis table.  She was then prepared and draped in the usual sterile fashion.  A timeout was done confirming site and type of surgery.  She did receive prophylactic antibiotics and tranexamic acid.    The previous incision was opened up and extended caudal and cephalad and the spine progressively exposed.  Intraoperative imaging was used to confirm the appropriate level of dissection.    A spinous process tracking arc was attached to the spinous process of L5 and intraoperative 3D images were obtained and transferred to the O-arm.  This was used to place pedicle screws in a navigated fashion.  Of note is we had to resect a marked arthritic facet joints at L5 in order to get access to the pedicle entry points.    The screws that were placed were from the Medtronic Solera system.  These were titanium screws with cobalt chrome heads.  They were placed by identifying the start point with a navigated awl, using a bur to create a cortical defect, then using the awl to create a channel, followed by navigated taps, followed by navigated screws. At L5, we placed 7.5 x 45 mm screws bilaterally and at L4, we placed 6.5 x 50 bilaterally. Intraoperative 2D and 3D images confirmed appropriate placement of the screws.    Next, I applied some distraction on the screws to identify the joint space a little better and progressively resected the interspinous ligament, the inferior articular facets of L4, the ligamentum flavum and superior articular facets of L5, completing the Smith-Davalos osteotomy.  Of note, there was significant lateral recess stenosis that was pronounced on the left side at L5 and this was decompressed with a Kerrison rongeur.  Next, the disk space was entered and the disk material progressively resected. The disk space was entered by incising the disk, utilizing an osteotome to free up the disk material and then a combination of  curettes and pituitary rongeurs to remove the disk material.  Insert and rotate distractors were used to elevate this up.  We were able to elevate it up to accept 2 Medtronic PEEK Capstone control interbody implants, 13 mm high, 22 mm long, 18 degrees lordotic.  These were filled with local bone graft and 4.5 mL of bone graft material was placed in between the cages.  The left cage was inserted and allograft bone graft placed lateral to it, then the right-sided cage was seated into place and these were checked under fluoroscopy.  We then took the table, which was in 5 degrees of flexion, to 10 degrees of extension to help enhance lordosis.  Two 35 mm rods were selected, seated into place and compression applied across the segment.  Intraoperative 2D imaging showed excellent realignment with restoration of appropriate lordosis across this segment.    Next, a lumbar puncture was performed at L3-4.  Clear CSF returned and 0.25 mg of intrathecal morphine was administered at approximately 1000 hours.    The set plugs were torqued off in accordance with the 's specification. The posterior elements of L4 and L5 decorticated and then residual local bone plus 15-20 mL of crushed cancellous allograft bone was used to perform a posterior fusion.  Prior to bone grafting, we had copiously irrigated the wound.      Wound was now closed in layers.  An 1/8-inch drain was placed superficial to the fascia and brought out proximally on the right side.  The skin was closed with an intradermal suture,  benzoin and Steri-Strips.  Estimated blood loss for this case was 210 mL in a patient with an estimated blood volume of 5166 mL. She was then turned supine, extubated, and transported to the postanesthesia care unit in stable condition.      Paul Romero Jr, MD        D: 09/15/2021   T: 09/15/2021   MT: [facility]    Name:     ANGÉLICA QUINTERO  MRN:      [id number]        Account:        [id number]   :      1960            Procedure Date: 09/15/2021     Document: [id number]    cc:  Copy For Patient   Carley Kern MD

## 2021-09-15 NOTE — PROGRESS NOTES
SPIRITUAL HEALTH SERVICES  [facility] ([facility]) PRE-OP   PRE-SURGERY VISIT    Had pre-surgery visit with pt.  Provided spiritual support, prayer.  Pt and  are Quaker, we prayed the H[place] Mare together.     Michelle Shields     Pager: [phone]

## 2021-09-15 NOTE — OR NURSING
PACU to Inpatient Nursing Handoff    Patient Mare Dominguez is a 61 year old female who speaks English.   Procedure Procedure(s):  Decompression and transforaminal lumbar interbody fusion Lumbar 4 to 5   Surgeon(s) Primary: Paul Romero MD  Assisting: Olivia Strange PA-C     Allergies   Allergen Reactions     Hydrocodone Nausea and Vomiting     Latex Rash     Topical rash from gloves     Percocet [Oxycodone-Acetaminophen] Nausea and Vomiting     Sulfa Drugs Hives       Isolation  [unfilled]     Past Medical History   has a past medical history of Basal cell carcinoma, Cervical high risk HPV (human papillomavirus) test positive (07/28/2017), Cervicalgia, Degeneration of lumbar or lumbosacral intervertebral disc, Other and unspecified malignant neoplasm of skin of other and unspecified parts of face (1994), Other and unspecified ovarian cyst (1978), and S/P lumpectomy of breast (10/22/2001).    Anesthesia General   Dermatome Level     Preop Meds acetaminophen (Tylenol) - time given: 0618  gabapentin (Neurontin) - time given: 0618   Nerve block Not applicable   Intraop Meds dexamethasone (Decadron)  hydromorphone (Dilaudid): 1 mg total  intrathecal morphine (Duramorph): last given at 1000  ondansetron (Zofran): last given at 1007  versed 2mg   Local Meds No   Antibiotics cefazolin (Ancef) - last given at 0758     Pain Patient Currently in Pain: sleeping, patient not able to self report   PACU meds  fentanyl (Sublimaze): 50 mcg (total dose) last given at 1109   hydromorphone (Dilaudid): 0.4 mg (total dose) last given at 1154   ondansetron (Zofran): 4 mg (total dose) last given at 1055   prochlorperazine (Compazine): 5 mg (total dose) last given at 1120    PCA / epidural No   Capnography Respiratory Monitoring (EtCO2): 50 mmHg   Telemetry ECG Rhythm: Sinus rhythm   Inpatient Telemetry Monitor Ordered? No        Labs Glucose Lab Results   Component Value Date     09/15/2021     08/20/2021     GLC 92 10/21/2020       Hgb Lab Results   Component Value Date    HGB 12.8 08/20/2021    HGB 11.7 08/09/2019       INR No results found for: INR   PACU Imaging Not applicable     Wound/Incision Incision/Surgical Site 08/09/19 Abdomen (Active)   Number of days: 768       Incision/Surgical Site 09/15/21 Posterior Back (Active)   Incision Assessment [facility] 09/15/21 1215   Closure NOHEMY;Adhesive strip(s) 09/15/21 1215   Incision Drainage Amount UT 09/15/21 1215   Dressing Intervention Clean, dry, intact 09/15/21 1215   Number of days: 0      CMS        Equipment ice pack   Other LDA       IV Access Peripheral IV 09/15/21 Right Hand (Active)   Site Assessment [facility] 09/15/21 1215   Line Status Infusing 09/15/21 1215   Phlebitis Scale 0-->no symptoms 09/15/21 1215   Infiltration Scale 0 09/15/21 1215   Number of days: 0       Peripheral IV 09/15/21 Left Wrist (Active)   Site Assessment [facility] 09/15/21 1215   Line Status Saline locked 09/15/21 1215   Phlebitis Scale 0-->no symptoms 09/15/21 1215   Infiltration Scale 0 09/15/21 1215   Number of days: 0      Blood Products Not applicable  mL   Intake/Output Date 09/15/21 0700 - 09/16/21 0659   Shift 4054-7349 7430-4772 1177-6958 24 Hour Total   INTAKE   I.V. 1200   1200   Shift Total(mL/kg) 1200(16.26)   1200(16.26)   OUTPUT   Urine 500   500   Blood 210   210   Shift Total(mL/kg) 710(9.62)   710(9.62)   Weight (kg) 73.8 73.8 73.8 73.8      Drains / Yu Closed/Suction Drain 1 Right;Posterior Back Accordion 10 Ghanaian (Active)   Site Description [facility] 09/15/21 1215   Dressing Status Normal: Clean, Dry & Intact 09/15/21 1215   Drainage Appearance Bloody/Bright Red 09/15/21 1215   Status To bulb suction 09/15/21 1215   Number of days: 0       Urethral Catheter Non-latex;Straight-tip 16 fr (Active)   Tube Description [facility] 09/15/21 1215   Collection Container Standard 09/15/21 1215   Securement Method Securing device (Describe) 09/15/21 1215   Number of days: 0      Time of void PreOp  Void Prior to Procedure: 0600 (09/15/21 0610)    PostOp      Diapered? No   Bladder Scan     PO    nausea     Vitals    B/P: 107/62  T: 97.5  F (36.4  C)    Temp src: Oral  P:  Pulse: 58 (09/15/21 1230)          R: 12  O2:  SpO2: 99 %    O2 Device: Nasal cannula (09/15/21 1230)    Oxygen Delivery: 2 LPM (09/15/21 1230)         Family/support present significant other   Patient belongings     Patient transported on bed   DC meds/scripts (obs/outpt) Not applicable   Inpatient Pain Meds Released? Yes       Special needs/considerations None   Tasks needing completion None     Concepcion Allen, RN  [facility] [id number]

## 2021-09-15 NOTE — CONSULTS
[facility]  Consult Note - Hospitalist Service     Date of Admission:  9/15/2021  Consult Requested by: Alexandr WHEATLEY  Reason for Consult: Medical co-management    Assessment & Plan   Mare Dominguez is a 61 year old woman admitted on 9/15/2021. She has a history of GERD, spondylolisthesis with neurogenic claudication s/p transforaminal lumbar interbody fusion L4-5 on 9/15.    1) S/p transforaminal lumbar interbody fusion L4-5   - POD 0  - Ortho managing as primary team  - Pain well controlled with hydromorphone  - No NSAIDs x 3 months per orthopedics    2) History of GERD  - Continue home home esomeprazole (substituted as Protonix)        SANDIE Rider CNP  [facility]  Securely message with the Vocera Web Console (learn more here)  Text page via [employer] Paging/Directory      Clinically Significant Risk Factors Present on Admission                   ______________________________________________________________________    Chief Complaint   S/p transforaminal lumbar interbody fusion L4-5    History is obtained from the patient    History of Present Illness   Mare Dominguez is a 61 year old woman admitted on 9/15/2021. She has a history of GERD, spondylolisthesis with neurogenic claudication s/p transforaminal lumbar interbody fusion L4-5 on 9/15.    The patient reports she is doing well post operatively.  Her pain is well controlled.    She denies any recent illness or concerns and specifically denies any recent fevers, chills, chest pain, shortness of breath or major illness.  She denies any history of heart disease, lung issues or diabetes.    Review of Systems   The 10 point Review of Systems is negative other than noted in the HPI or here.     Past Medical History    I have reviewed this patient's medical history and updated it with pertinent information if needed.   Past Medical History:   Diagnosis  Date     Basal cell carcinoma      Cervical high risk HPV (human papillomavirus) test positive 07/28/2017    Not 16/18     Cervicalgia     , MRI 10/05 c6-c7 sever left foraminal stenosis, s/p epidural injection 10/20/05     Degeneration of lumbar or lumbosacral intervertebral disc     degen disc lumbar      Other and unspecified malignant neoplasm of skin of other and unspecified parts of face 1994    Basal cell cancer right nose     Other and unspecified ovarian cyst 1978    ovarian cyst -Mare thought that her right ovary was removed, but in fact in retrospect it appears that the left has been removed.     S/P lumpectomy of breast 10/22/2001    excision left breast       Past Surgical History   I have reviewed this patient's surgical history and updated it with pertinent information if needed.  Past Surgical History:   Procedure Laterality Date     ARTHROSCOPIC RECONSTRUCTION ANTERIOR CRUCIATE LIGAMENT Left 2013     BIOPSY BREAST       COLONOSCOPY  07/26/2012    Procedure: COLONOSCOPY;  Colonoscopy;  Surgeon: Norberto Rivera MD;  Location: [facility]     LAPAROSCOPIC APPENDECTOMY N/A 08/09/2019    Procedure: APPENDECTOMY, LAPAROSCOPIC;  Surgeon: Paul Rodriguez MD;  Location: [facility]     LUMPECTOMY BREAST       PUNC/ASPIR BREAST CYST      early 2020 [facility]     SURGICAL HISTORY OF -   01/01/1978    Ovarian cyst laparotomy      SURGICAL HISTORY OF -   01/01/1999    Discectomy lumbar     SURGICAL HISTORY OF -   08/30/1994    Mohs' excision basal cell carcinoma right nasal root       Social History   I have reviewed this patient's social history and updated it with pertinent information if needed.  Social History     Tobacco Use     Smoking status: Never Smoker     Smokeless tobacco: Never Used   Substance Use Topics     Alcohol use: Yes     Comment: every day, 1-2     Drug use: No       Family History   I have reviewed this patient's family history and updated it with pertinent information if needed.  Family  History   Problem Relation Age of Onset     Lipids Mother      Arrhythmia Mother      Neurologic Disorder Father         Parkinson's     Hypertension Maternal Grandmother      Heart Disease Maternal Grandfather      Cancer Paternal Grandfather         G.I.     Thyroid Disease Sister         Cancer     Cancer Sister         thyroid     ALS Sister        Medications   Medications Prior to Admission   Medication Sig Dispense Refill Last Dose     Calcium Carbonate-Vitamin D (CALCIUM + D) 600-200 MG-UNIT per tablet Take 2 tablets by mouth daily    Past Week at Unknown time     CRANBERRY PO Take 30,000 mg by mouth daily   Past Week at Unknown time     esomeprazole (NEXIUM) 20 MG DR capsule Take 20 mg by mouth daily as needed Take 30-60 minutes before eating.   9/14/2021 at 1900     Flaxseed, Linseed, (FLAX SEED OIL) 1000 MG capsule Take 1 capsule by mouth 2 times daily    Past Week at Unknown time     GLUCOSAMINE 500 MG OR CAPS Take 1,500 mg by mouth 2 times daily    Past Week at Unknown time     magnesium 250 MG tablet Take 1 tablet by mouth every evening    Past Week at Unknown time     MIRALAX OR POWD STIR 1 CAPFUL (17GM) IN 8 OZ OF LIQUID AND DRINK (Patient taking differently: Take 0.5 capfuls by mouth every morning ) 1 Bottle prn 9/14/2021 at 0800       Allergies   Allergies   Allergen Reactions     Hydrocodone Nausea and Vomiting     Latex Rash     Topical rash from gloves     Percocet [Oxycodone-Acetaminophen] Nausea and Vomiting     Sulfa Drugs Hives       Physical Exam   Vital Signs: Temp: 97.3  F (36.3  C) Temp src: Oral BP: 105/67 Pulse: 56   Resp: 18 SpO2: 100 % O2 Device: Nasal cannula Oxygen Delivery: 2 LPM  Weight: 162 lbs 11.19 oz    Physical Exam   Constitutional:   Well nourished, well developed, resting comfortably   Head: Normocephalic and atraumatic.   Eyes: Conjunctivae are normal. Pupils are equal, round, and reactive to light.    Oropharynx: Pharynx has no erythema or exudate, mucous membranes are  moist  Neck:   No adenopathy, no bony tenderness  Cardiovascular: Regular rate and rhythm without murmurs or gallops  Pulmonary/Chest: Clear to auscultation bilaterally, with no wheezes or retractions. No respiratory distress.  GI: Soft with good bowel sounds.  Non-tender, non-distended, with no guarding, no rebound, no peritoneal signs.   Musculoskeletal:  No edema or clubbing   Skin: Skin is warm and dry. No rash noted.   Neurological: Alert and oriented to person, place, and time. Nonfocal exam  Psychiatric:  Normal mood and affect.

## 2021-09-15 NOTE — ANESTHESIA CARE TRANSFER NOTE
Patient: Mare Dominguez    Procedure(s):  Decompression and transforaminal lumbar interbody fusion Lumbar 4 to 5    Diagnosis: Degenerative spondylolisthesis [M43.10]  Diagnosis Additional Information: No value filed.    Anesthesia Type:   General     Note:    Oropharynx: oropharynx clear of all foreign objects and spontaneously breathing  Level of Consciousness: drowsy  Oxygen Supplementation: face mask    Independent Airway: airway patency satisfactory and stable  Dentition: dentition unchanged  Vital Signs Stable: post-procedure vital signs reviewed and stable  Report to RN Given: handoff report given  Patient transferred to: PACU    Handoff Report: Identifed the Patient, Identified the Reponsible Provider, Reviewed the pertinent medical history, Discussed the surgical course, Reviewed Intra-OP anesthesia mangement and issues during anesthesia, Set expectations for post-procedure period and Allowed opportunity for questions and acknowledgement of understanding      Vitals:  Vitals Value Taken Time   /85 09/15/21 1039   Temp     Pulse 78 09/15/21 1044   Resp 7 09/15/21 1044   SpO2 100 % 09/15/21 1044   Vitals shown include unvalidated device data.    Electronically Signed By: SANDIE Teixeira CRNA  September 15, 2021  10:44 AM

## 2021-09-15 NOTE — ANESTHESIA PROCEDURE NOTES
Airway         Procedure Start/Stop Times: 9/15/2021 7:41 AM  Staff -        Anesthesiologist:  Tung Baer DO       CRNA: Jessica Lemus APRN CRNA       Other Anesthesia Staff: Angie Hua       Performed By: CAYLA  Consent for Airway        Urgency: elective  Indications and Patient Condition       Indications for airway management: annemarie-procedural       Induction type:intravenous       Mask difficulty assessment: 1 - vent by mask    Final Airway Details       Final airway type: endotracheal airway       Successful airway: ETT - single and Oral  Endotracheal Airway Details        ETT size (mm): 7.0       Cuffed: yes       Successful intubation technique: video laryngoscopy       VL Blade Size: MAC 3       Grade View of Cords: 1       Adjucts: stylet       Position: Left       Measured from: lips       Secured at (cm): 20       Bite block used: None    Post intubation assessment        Placement verified by: capnometry, equal breath sounds and chest rise        Number of attempts at approach: 1       Secured with: silk tape       Ease of procedure: easy       Dentition: Intact and Unchanged

## 2021-09-16 ENCOUNTER — APPOINTMENT (OUTPATIENT)
Dept: OCCUPATIONAL THERAPY | Facility: CLINIC | Age: 61
End: 2021-09-16
Attending: ORTHOPAEDIC SURGERY
Payer: COMMERCIAL

## 2021-09-16 ENCOUNTER — APPOINTMENT (OUTPATIENT)
Dept: PHYSICAL THERAPY | Facility: CLINIC | Age: 61
End: 2021-09-16
Attending: ORTHOPAEDIC SURGERY
Payer: COMMERCIAL

## 2021-09-16 LAB
GLUCOSE BLD-MCNC: 94 MG/DL (ref 70–99)
HGB BLD-MCNC: 10.3 G/DL (ref 11.7–15.7)

## 2021-09-16 PROCEDURE — 97165 OT EVAL LOW COMPLEX 30 MIN: CPT | Mod: GO

## 2021-09-16 PROCEDURE — 82947 ASSAY GLUCOSE BLOOD QUANT: CPT | Performed by: HOSPITALIST

## 2021-09-16 PROCEDURE — 97161 PT EVAL LOW COMPLEX 20 MIN: CPT | Mod: GP | Performed by: PHYSICAL THERAPIST

## 2021-09-16 PROCEDURE — 99231 SBSQ HOSP IP/OBS SF/LOW 25: CPT | Performed by: INTERNAL MEDICINE

## 2021-09-16 PROCEDURE — 36415 COLL VENOUS BLD VENIPUNCTURE: CPT | Performed by: HOSPITALIST

## 2021-09-16 PROCEDURE — 120N000002 HC R&B MED SURG/OB UMMC

## 2021-09-16 PROCEDURE — 97116 GAIT TRAINING THERAPY: CPT | Mod: GP | Performed by: PHYSICAL THERAPIST

## 2021-09-16 PROCEDURE — 99207 PR CDG-MDM COMPONENT: MEETS MODERATE - DOWN CODED: CPT | Performed by: INTERNAL MEDICINE

## 2021-09-16 PROCEDURE — 97530 THERAPEUTIC ACTIVITIES: CPT | Mod: GP

## 2021-09-16 PROCEDURE — 97110 THERAPEUTIC EXERCISES: CPT | Mod: GP | Performed by: PHYSICAL THERAPIST

## 2021-09-16 PROCEDURE — 85018 HEMOGLOBIN: CPT | Performed by: PHYSICIAN ASSISTANT

## 2021-09-16 PROCEDURE — 97530 THERAPEUTIC ACTIVITIES: CPT | Mod: GP | Performed by: PHYSICAL THERAPIST

## 2021-09-16 PROCEDURE — 250N000013 HC RX MED GY IP 250 OP 250 PS 637: Performed by: PHYSICIAN ASSISTANT

## 2021-09-16 PROCEDURE — 97535 SELF CARE MNGMENT TRAINING: CPT | Mod: GO

## 2021-09-16 PROCEDURE — 97530 THERAPEUTIC ACTIVITIES: CPT | Mod: GO

## 2021-09-16 PROCEDURE — 250N000011 HC RX IP 250 OP 636: Performed by: PHYSICIAN ASSISTANT

## 2021-09-16 PROCEDURE — 97116 GAIT TRAINING THERAPY: CPT | Mod: GP

## 2021-09-16 RX ORDER — SODIUM CHLORIDE 9 MG/ML
INJECTION, SOLUTION INTRAVENOUS
Status: DISPENSED
Start: 2021-09-16 | End: 2021-09-16

## 2021-09-16 RX ADMIN — HYDROXYZINE HYDROCHLORIDE 25 MG: 25 TABLET, FILM COATED ORAL at 20:14

## 2021-09-16 RX ADMIN — HYDROMORPHONE HYDROCHLORIDE 2 MG: 2 TABLET ORAL at 08:57

## 2021-09-16 RX ADMIN — CEFAZOLIN 1 G: 1 INJECTION, POWDER, FOR SOLUTION INTRAMUSCULAR; INTRAVENOUS at 01:31

## 2021-09-16 RX ADMIN — ACETAMINOPHEN 975 MG: 325 TABLET, FILM COATED ORAL at 04:12

## 2021-09-16 RX ADMIN — HYDROMORPHONE HYDROCHLORIDE 4 MG: 2 TABLET ORAL at 21:14

## 2021-09-16 RX ADMIN — HYDROMORPHONE HYDROCHLORIDE 2 MG: 2 TABLET ORAL at 17:10

## 2021-09-16 RX ADMIN — ACETAMINOPHEN 975 MG: 325 TABLET, FILM COATED ORAL at 20:08

## 2021-09-16 RX ADMIN — POLYETHYLENE GLYCOL 3350 17 G: 17 POWDER, FOR SOLUTION ORAL at 08:01

## 2021-09-16 RX ADMIN — DOCUSATE SODIUM AND SENNOSIDES 1 TABLET: 8.6; 5 TABLET ORAL at 20:08

## 2021-09-16 RX ADMIN — ACETAMINOPHEN 975 MG: 325 TABLET, FILM COATED ORAL at 12:26

## 2021-09-16 RX ADMIN — METHOCARBAMOL 750 MG: 750 TABLET ORAL at 17:50

## 2021-09-16 RX ADMIN — HYDROMORPHONE HYDROCHLORIDE 2 MG: 2 TABLET ORAL at 14:20

## 2021-09-16 RX ADMIN — DOCUSATE SODIUM AND SENNOSIDES 1 TABLET: 8.6; 5 TABLET ORAL at 08:01

## 2021-09-16 ASSESSMENT — ACTIVITIES OF DAILY LIVING (ADL): IADL_COMMENTS: WILL HAVE ASSIST WITH IADLS

## 2021-09-16 NOTE — PLAN OF CARE
"BP 94/57 (BP Location: Right arm)   Pulse 62   Temp 98.1  F (36.7  C) (Oral)   Resp 16   Ht 1.676 m (5' 6\")   Wt 73.8 kg (162 lb 11.2 oz)   LMP 10/18/2012   SpO2 100%   BMI 26.26 kg/m     LS clear. O2% remains >92% on RA. IS use reinforced.     Aox4, CMS intact. Denies numbness and tingling upon assessment.  strength in hands 5/5.   Denies CP/SOB, n/v. Scopolamine patch in place, behind R ear.  Reg diet. Good appetite.   Yu tube patent, voiding adequate amounts, urine clear, straw colored.  LBM prior to surgery, BS hypoactive.   Up SBA/1 with walker/gait belt. Up in chair for almost two hours this afternoon. Log rolled/repositioned q2 hours.   Cold pack to back with aqua k machine. Pain managed well with tylenol, but writer encouraged pt to start small dose of dilaudid this AM as the IT morphine wears off. Pt took 2mg dose x2, with tylenol, stated pain tolerable. Skin WNL ex incision, small amt drainage marked, not extended. Hemovac patent, 50mL bright bloody drainage out.   R PIV TKO, L PIV SL.    at bedside. Needs XR once drains are out.   Addendum: Pt plans to go home once discharge criteria are met.     "

## 2021-09-16 NOTE — PROGRESS NOTES
09/16/21 1100   Quick Adds   Type of Visit Initial Occupational Therapy Evaluation   Living Environment   People in home spouse   Current Living Arrangements house   Home Accessibility stairs within home   Number of Stairs, Within Home, Primary 6   Transportation Anticipated car, drives self;family or friend will provide   Living Environment Comments ramp to enter home, 6 stairs immediately due to split level. Upstairs standard toilet, downstairs higher. Upstairs tub/shower, downstairs walk in shower with shower chair and HH shower head.    Self-Care   Usual Activity Tolerance good   Current Activity Tolerance good   Regular Exercise Yes   Activity/Exercise Type walking   Exercise Amount/Frequency 3-5 times/wk;1 hr   Equipment Currently Used at Home   (has shower chair)   Instrumental Activities of Daily Living (IADL)   IADL Comments will have assist with IADLs   Disability/Function   Hearing Difficulty or Deaf no   Wear Glasses or Blind yes   Vision Management reading bifocals   Concentrating, Remembering or Making Decisions Difficulty no   Difficulty Communicating no   Difficulty Eating/Swallowing no   Walking or Climbing Stairs Difficulty no   Dressing/Bathing Difficulty yes   Dressing/Bathing Management sits down to get dressed    Toileting issues no   Doing Errands Independently Difficulty (such as shopping) no   Fall history within last six months no   General Information   Onset of Illness/Injury or Date of Surgery 09/15/21   Referring Physician Olivia Strange PA-C   Patient/Family Therapy Goal Statement (OT) Home, back to baseline   Additional Occupational Profile Info/Pertinent History of Current Problem s/p decompression and transforaminal lumbar interbody fusion Lumbar 4 to 5   Existing Precautions/Restrictions fall;spinal   Left Upper Extremity (Weight-bearing Status) partial weight-bearing (PWB)  (10 lbs)   Right Upper Extremity (Weight-bearing Status) partial weight-bearing (PWB)  (10 lbs)    Cognitive Status Examination   Cognitive Status Comments no conerns    Visual Perception   Visual Impairment/Limitations corrective lenses full-time   Sensory   Sensory Comments baseline mild numbnes in toes    Pain Assessment   Patient Currently in Pain Yes, see Vital Sign flowsheet   Range of Motion Comprehensive   General Range of Motion no range of motion deficits identified   Strength Comprehensive (MMT)   Comment, General Manual Muscle Testing (MMT) Assessment not formally tested secondary to precautions    Muscle Tone Assessment   Muscle Tone Quick Adds No deficits were identified   Coordination   Upper Extremity Coordination No deficits were identified   Bed Mobility   Comment (Bed Mobility) NA during session   Sit-Stand Transfer   Sit-Stand Clayton (Transfers) contact guard;set up;verbal cues   Assistive Device (Sit-Stand Transfers) walker, front-wheeled   Toilet Transfer   Type (Toilet Transfer) sit-stand;stand-sit   Clayton Level (Toilet Transfer) supervision;set up   Assistive Device (Toilet Transfer) grab bars/safety frame   Lower Body Dressing Assessment   Clayton Level (Lower Body Dressing) minimum assist (75% patient effort)   Grooming Assessment   Clayton Level (Grooming) supervision;set up;verbal cues   Position (Grooming) supported standing   Clinical Impression   Criteria for Skilled Therapeutic Interventions Met (OT) yes   OT Diagnosis Decreased functional mobility and ADLs   OT Problem List-Impairments impacting ADL strength;pain;post-surgical precautions   Assessment of Occupational Performance 3-5 Performance Deficits   Identified Performance Deficits dressing, bathing, toileting, transfers, home mgmt   Planned Therapy Interventions (OT) ADL retraining;transfer training   Clinical Decision Making Complexity (OT) low complexity   Therapy Frequency (OT) Daily   Predicted Duration of Therapy 3 days   Anticipated Equipment Needs Upon Discharge (OT) reacher  (TBD)   Risk &  Benefits of therapy have been explained care plan/treatment goals reviewed;patient   OT Discharge Planning    OT Discharge Recommendation (DC Rec) Home with assist   OT Rationale for DC Rec Patient is mobilizing well, anticipate will progress to safe discharge home with assist from .    OT Brief overview of current status  CGA to stand from chair, SBA ambulation to/from bathroom using FWW and SBA toilet transfer.    Total Evaluation Time (Minutes)   Total Evaluation Time (Minutes) 8

## 2021-09-16 NOTE — PLAN OF CARE
"      VS: /73 (BP Location: Right arm)   Pulse 59   Temp 97.7  F (36.5  C) (Oral)   Resp 18   Ht 1.676 m (5' 6\")   Wt 73.8 kg (162 lb 11.2 oz)   LMP 10/18/2012   SpO2 99%   BMI 26.26 kg/m    Room air   Output: Gloria putting out adequate amount of urine, LBM 9/14   Lungs: CTA   Activity: Able to readjust in bed with A-1   Skin: Intact ex incision   Pain:   Tolerable with slight discomfort. Given tylenol   Neuro/CMS:   A&Ox4, denies numbness and tingling   Dressing(s):   Scant drainage- marked- no change   Diet:   Regular- tolerating small amount of liquid food- nausea resolving   LDA:   R PIV infusing, L PIV SL, aliyah gloria   Equipment:   IV pole, PCD's   Plan:   Continue to monitor and follow plan of care. Able to make needs known and call light within reach   Additional Info:          "

## 2021-09-16 NOTE — PLAN OF CARE
"      VS:   BP 94/57 (BP Location: Right arm)   Pulse 62   Temp 98.1  F (36.7  C) (Oral)   Resp 16   Ht 1.676 m (5' 6\")   Wt 73.8 kg (162 lb 11.2 oz)   LMP 10/18/2012   SpO2 100%   BMI 26.26 kg/m    VSS RA.   Output:   Yu, planning for removal POD#2, output adequate. LBM Tuesday.   Activity:   Assist of 1 with walker and gait belt   Skin: Ex spine incision.   Pain:   C/o moderate pain this evening given Oral dilaudid and robaxin.    Neuro/CMS:   A/Ox4 CMS intact.    Dressing(s):   Dressing marked with scant drainage unchanged   Diet:   Regular, tolerating.   LDA:   PIV    Equipment:   IV pole, call light, PCDs on   Plan:   Pt is able to make needs known and call light in reach   Additional Info:   Scop patch removed       "

## 2021-09-16 NOTE — PROGRESS NOTES
"[facility], [place]   Internal Medicine Daily Note           Interval History/Events     Overnight events reviewed  Reports doing well  No nausea, vomiting, chest pain, shortness of breath         Review of Systems        4 point ROS including Respiratory, CV, GI and , other than that noted above is negative      Medications   I have reviewed current medications  in the \"current medication\" section of Epic.  Relevant changes include:     Physical Exam   General:       Vital signs:    Blood pressure 94/57, pulse 62, temperature 98.1  F (36.7  C), temperature source Oral, resp. rate 16, height 1.676 m (5' 6\"), weight 73.8 kg (162 lb 11.2 oz), last menstrual period 10/18/2012, SpO2 100 %, not currently breastfeeding.  Estimated body mass index is 26.26 kg/m  as calculated from the following:    Height as of this encounter: 1.676 m (5' 6\").    Weight as of this encounter: 73.8 kg (162 lb 11.2 oz).      Intake/Output Summary (Last 24 hours) at 9/16/2021 1430  Last data filed at 9/16/2021 0617  Gross per 24 hour   Intake 300 ml   Output 1110 ml   Net -810 ml        Constitutional: Laying in bed in no acute distress  Eye: No icterus, no pallor  Mouth/ENT: Normal oral mucosa  Cardiovascular: S1, S2 normal.   Respiratory: B/L CTA  GI: Soft, NT, BS+  : No gloria's catheter  Neurology: Alert, awake, and oriented.   Psych: Mood stable.   MSK:   Integumentary:   Heme/Lymph/Imm:      Laboratory and Imaging Studies     I have reviewed  laboratory and imaging studies in the Epic. Pertinent findings are as below:    BMP  Recent Labs   Lab 09/16/21  0800 09/15/21  0555   GLC 94 107*     CBC  Recent Labs   Lab 09/16/21  0800   HGB 10.3*     INRNo lab results found in last 7 days.  LFTsNo lab results found in last 7 days.   PANCNo lab results found in last 7 days.        Impression/Plan          61 year old woman admitted on 9/15/2021. She has a history of GERD, spondylolisthesis with neurogenic " claudication s/p transforaminal lumbar interbody fusion L4-5 on 9/15.     #  S/p transforaminal lumbar interbody fusion L4-5   Ortho managing as primary team  Pain well controlled with hydromorphone  No NSAIDs x 3 months per orthopedics    # Anemia: Likely d/t blood loss  Hg level 10.3 gm/dl   - Transfuse if Hg less than 7 gm/dl      # History of GERD  - Continue home home esomeprazole (substituted as Protonix)            Pt's care was discussed with bedside RN, patient and  during Care Team Rounds.               Max Hyde MD  Hospitalist ( Internal medicine)  Pager: [phone]

## 2021-09-16 NOTE — PLAN OF CARE
VS:   Vital signs:  Temp: 97.7  F (36.5  C) Temp src: Oral BP: 97/64 Pulse: 60   Resp: 16 SpO2: 100 % O2 Device: None (Room air)   Patient denies chest pain and SOB  Patient can drop slightly bradycardic when sleeping, sats remained 100 and patient RR remained WNL.    Output:   Yu in place (remove on POD#2). Patient not passing gas. Abdomen soft and non-distended.   BS slightly hypoactive. Bowel meds administered per orders.    Activity:   Patient repositions self well in bed. Patient sat up at bedside X1. Rolls well in bed.    Skin: Intact except for incision.    Pain:   Patient denies pain. States that oxycodone makes self feel sick. PRN dilaudid and Robaxin available. Ice used.   Scheduled tylenol administered.    Neuro/CMS: Patient denies N&T. Patient is A&O X4.    Dressing(s): Posterior spinal dressing C/D/I.    Diet:   Patient tolerated chicken broth, saltines, and liquids. Was having some nausea but subsided with zofran. Regular diet.    LDA: 1 PIV infusing continuously. Yu. Hemovac.    Equipment: PIV. PCD's.    Plan: Continue with plan of care.    Additional Info:   Patient has call light within reach and patient is able to make needs known.

## 2021-09-16 NOTE — PLAN OF CARE
Physical Therapy Discharge Summary    Reason for therapy discharge:    All goals and outcomes met, no further needs identified.    Progress towards therapy goal(s). See goals on Care Plan in [facility] electronic health record for goal details.  Goals met    Therapy recommendation(s):    No further therapy is recommended.  Continue home exercise program.    Pt up IND, no AD needed. OK to get up to bathroom or ambulate in charles when disconnected from lines.

## 2021-09-16 NOTE — PHARMACY-ADMISSION MEDICATION HISTORY
Admission Medication History Completed by Pharmacy    See [facility] Admission Navigator for allergy information, preferred outpatient pharmacy, prior to admission medications and immunization status.     Medication history sources:  patient interview via phone    Pertinent changes made to PTA medication list:  Added: N/A  Deleted: N/A  Changed:   - cranberry supplement daily-->BID    Additional medication history information:   - Patient denies taking any additional Rx/OTC medications other than the ones listed below.    Prior to Admission medications    Medication Sig Last Dose Taking? Auth Provider   Calcium Carbonate-Vitamin D (CALCIUM + D) 600-200 MG-UNIT per tablet Take 2 tablets by mouth daily  Past Week Yes Reported, Patient   CRANBERRY PO Take 30,000 mg by mouth 2 times daily  Past Week Yes Reported, Patient   esomeprazole (NEXIUM) 20 MG DR capsule Take 20 mg by mouth daily as needed Take 30-60 minutes before eating. 9/14/2021 Yes Reported, Patient   Flaxseed, Linseed, (FLAX SEED OIL) 1000 MG capsule Take 1 capsule by mouth 2 times daily  Past Week Yes Reported, Patient   GLUCOSAMINE 500 MG OR CAPS Take 1,500 mg by mouth 2 times daily  Past Week Yes Reported, Patient   magnesium 250 MG tablet Take 1 tablet by mouth every evening  Past Week Yes Reported, Patient   MIRALAX OR POWD STIR 1 CAPFUL (17GM) IN 8 OZ OF LIQUID AND DRINK  Patient taking differently: Take 0.5 capfuls by mouth every morning  9/14/2021 Yes Carley Kern MD     Date completed: 09/16/21    Medication history completed by:   Trung Kern, PharmD, [facility]  [facility]  8A/5MSO: Ascom *[id number]

## 2021-09-16 NOTE — PROGRESS NOTES
Orthopaedic Surgery Progress Note 09/16/2021    S: No acute events overnight.  Pain well controlled on Oral meds. Denies numbness or tingling in the affected extremity. chest pain (-), SOB(-), nausea/vomiting(+ yesterday, improved today). Tolerating oral diet. BM(-) flatus(-). Yu in place until POD2.      O:  Temp: 98.1  F (36.7  C) Temp src: Oral BP: 94/57 Pulse: 62   Resp: 16 SpO2: 100 % O2 Device: None (Room air) Oxygen Delivery: 2 LPM    Exam:  Gen: No acute distress, resting comfortably in bed.  Resp: Non-labored breathing  CV: wwp  MSK:  Spine:  - Dressings c/d/i  - Drain patent with serosanguinous output  - DP pulses 2+ bilaterally, feet wwp bilaterally    Lumbar Spine:   Motor -    L2-3: Hip flexion R 5/5  And L 5/5 strength         L3/4:  Knee extension R 5/5 and L 5/5 strength        L4/5:  Foot dorsiflexion R 5/5 L 5/5 and      EHL dorsiflexion R 5/5 L 5/5 strength        S1:  Plantarflexion/Peroneal Muscles  R 5/5 and L 5/5 strength   Sensation: intact to light touch L3-S1 distribution BLE       Drain output: 210ml yesterday, 10ml this morning.    Recent Labs   Lab 09/16/21  0800   HGB 10.3*       Assessment: Mare Dominguez is a 61 year old female s/p decompression TLIF L4-5 on 9/15/21 with Dr. Romero. Doing well.    Heather Primary  Activity:   - Up with assist until independent. No excessive bending or twisting. No lifting >10 lbs x 6 weeks.   Weight bearing status: WBAT.  Pain management:   - 0.25mg intrathecal morphine administered at L3-4 at 1000 on 9/15/21  - IV lidocaine: discontinued at end of case  - Transition from IV to PO narcotics as tolerated. No NSAIDs x 3 months.   Antibiotics: Ancef x 24 hours.  Diet: Begin with clear fluids and progress diet as tolerated.   DVT prophylaxis: SCDs only. No chemical DVT ppx needed.  Imaging: XR Upright lumbar PTDC - ordered.  Labs: Hgb POD#1.  Bracing/Splinting: None.  Dressings: Keep Aquacel c/d/i x 7 days.  Drains: HV superficial. Document output  per shift, will be discontinued at Orthopedic Surgery discretion.  Yu catheter: Remove POD#2 (intrathecal morphine).  Physical Therapy/Occupational Therapy: Eval and treat.  Cultures: none.    Consults: Hospitalist.  Follow-up: Clinic with Dr. Romero in 6 weeks with repeat x-rays.   Disposition: Pending progress with therapies, pain control on orals, and medical stability, anticipate discharge to home on POD #3-5.          Future Appointments   Date Time Provider Department Center   9/16/2021  8:45 AM Florence Ramirez Pt, PT [facility] [place]   9/16/2021 10:15 AM Berenice Askew, OT [facility] [place]   9/16/2021  2:15 PM Jose Gonzalez, PT [facility] [place]   9/17/2021 10:00 AM Florence Ramirez Pt, PT [facility] [place]   9/17/2021  3:30 PM Florence Ramirez Pt, PT [facility] [place]   10/25/2021  7:40 AM Carley Kern MD [facility]   10/26/2021  3:00 PM Olivia Strange PA-C [facility]   12/14/2021  3:00 PM Olivia Strange PA-C [facility]       Patient discussed with Dr. Romero.    Olivia Strange PA-C  Orthopaedic Spine Surgery    Thank you for allowing me to participate in this patient's care. Please page me directly any questions/concerns.   Securely message with the Vocera Web Console (learn more here)  Text page via [facility] Paging/Directory    If there is no response, if it is a weekend, or if it is during evening hours, please page the orthopaedic surgery resident on call via [facility] Paging/Directory

## 2021-09-16 NOTE — PROGRESS NOTES
09/16/21 0950   Quick Adds   Type of Visit Initial PT Evaluation       Present no   Language English   Living Environment   People in home spouse   Current Living Arrangements house   Home Accessibility stairs within home   Number of Stairs, Within Home, Primary 6   Stair Railings, Within Home, Primary railing on right side (ascending)  (Spindles on left if needed)   Transportation Anticipated car, drives self;family or friend will provide   Living Environment Comments ramp to enter home, 6 stairs immediately due to split level   Self-Care   Usual Activity Tolerance good   Current Activity Tolerance good   Regular Exercise Yes   Activity/Exercise Type walking   Exercise Amount/Frequency 3-5 times/wk;1 hr   Equipment Currently Used at Home shower chair   Activity/Exercise/Self-Care Comment Used to do aerobics but stopped due to back pain. Adjustable bed with head controls.   Disability/Function   Hearing Difficulty or Deaf no   Wear Glasses or Blind yes   Vision Management reading bifocals   Concentrating, Remembering or Making Decisions Difficulty no   Difficulty Communicating no   Difficulty Eating/Swallowing no   Walking or Climbing Stairs Difficulty no   Dressing/Bathing Difficulty yes   Dressing/Bathing Management Personal changes (sit down to put pants on, etc) due to back pain.   Toileting issues no   Doing Errands Independently Difficulty (such as shopping) no   Fall history within last six months no   Change in Functional Status Since Onset of Current Illness/Injury no   General Information   Onset of Illness/Injury or Date of Surgery 09/15/21   Referring Physician Paul Romero MD   Patient/Family Therapy Goals Statement (PT) Get up and moving, decrease back pain, eventual return to aerobics, be able to ski on ski trip in February.   Pertinent History of Current Problem (include personal factors and/or comorbidities that impact the POC) s/p decompression and transforaminal  lumbar interbody fusion Lumbar 4 to 5   Existing Precautions/Restrictions spinal;fall   Weight-Bearing Status - LUE partial weight-bearing (% in comments)  (10#)   Weight-Bearing Status - RUE partial weight-bearing (% in comments)  (10#)   Weight-Bearing Status - LLE full weight-bearing   Weight-Bearing Status - RLE full weight-bearing   Heart Disease Risk Factors Overweight   General Observations Pt is pleasant and well-appearing.   Cognition   Orientation Status (Cognition) oriented x 4   Affect/Mental Status (Cognition) WNL   Follows Commands (Cognition) over 90% accuracy;follows one-step commands   Cognitive Status Comments WNL   Pain Assessment   Patient Currently in Pain Yes, see Vital Sign flowsheet   Integumentary/Edema   Integumentary/Edema Comments Incision not assessed due to dressing. Appears intact otherwise.   Posture    Posture Not impaired   Range of Motion (ROM)   ROM Comment Not formally assessed, appears WFL for functional mobility.   Strength   Strength Comments Not formally assessed, appears WFL for functional mobility.   Bed Mobility   Comment (Bed Mobility) supine > sit within precautions with SBAx1.   Transfers   Transfer Safety Comments sit <> stand within precautions with FWW and CGAx1.   Gait/Stairs (Locomotion)   Distance in Feet (Required for LE Total Joints) 120'   Comment (Gait/Stairs) Gait within precautions with FWW and CGAx1.   Balance   Balance Comments Not formally assessed, appears WFL for functional mobility.   Sensory Examination   Sensory Perception Comments Toes numb on both feet, happened prior to surgery.   Coordination   Coordination Comments Not formally assessed, appears WFL for functional mobility.   Muscle Tone   Muscle Tone Comments Not formally assessed, appears WFL for functional mobility.   Clinical Impression   Criteria for Skilled Therapeutic Intervention yes, treatment indicated   PT Diagnosis (PT) Decreased functional mobility, decreased range of motion.    Influenced by the following impairments pain, s/p spinal surgery.   Functional limitations due to impairments Decreased independence, decreased functional mobility.   Clinical Presentation Stable/Uncomplicated   Clinical Presentation Rationale Pt presents consistently with diagnosis   Clinical Decision Making (Complexity) low complexity   Therapy Frequency (PT) 2x/day   Predicted Duration of Therapy Intervention (days/wks) 3 days   Planned Therapy Interventions (PT) bed mobility training;gait training;stair training;strengthening;home exercise program;patient/family education;transfer training   Anticipated Equipment Needs at Discharge (PT) walker, rolling   Risk & Benefits of therapy have been explained care plan/treatment goals reviewed;evaluation/treatment results reviewed;participants voiced agreement with care plan;patient;participants included   PT Discharge Planning    PT Discharge Recommendation (DC Rec) home with assist;home with outpatient physical therapy   PT Rationale for DC Rec Pt has  support at home, is slightly below baseline but expected to progress relatively quickly. Pt should receive outpatient PT to progress to PLOF and reach longer term goals of return to high-level activity.   PT Brief overview of current status  supine > sit with SBAx1 and bedrail, sit <> stand and gait with FWW and CGAx1.   Total Evaluation Time   Total Evaluation Time (Minutes) 7

## 2021-09-17 ENCOUNTER — APPOINTMENT (OUTPATIENT)
Dept: OCCUPATIONAL THERAPY | Facility: CLINIC | Age: 61
End: 2021-09-17
Attending: ORTHOPAEDIC SURGERY
Payer: COMMERCIAL

## 2021-09-17 ENCOUNTER — APPOINTMENT (OUTPATIENT)
Dept: GENERAL RADIOLOGY | Facility: CLINIC | Age: 61
End: 2021-09-17
Attending: PHYSICIAN ASSISTANT
Payer: COMMERCIAL

## 2021-09-17 VITALS
HEART RATE: 77 BPM | HEIGHT: 66 IN | SYSTOLIC BLOOD PRESSURE: 117 MMHG | WEIGHT: 162.7 LBS | BODY MASS INDEX: 26.15 KG/M2 | OXYGEN SATURATION: 96 % | RESPIRATION RATE: 16 BRPM | DIASTOLIC BLOOD PRESSURE: 68 MMHG | TEMPERATURE: 99.8 F

## 2021-09-17 LAB — HGB BLD-MCNC: 9.9 G/DL (ref 11.7–15.7)

## 2021-09-17 PROCEDURE — 85018 HEMOGLOBIN: CPT | Performed by: PHYSICIAN ASSISTANT

## 2021-09-17 PROCEDURE — 72100 X-RAY EXAM L-S SPINE 2/3 VWS: CPT | Mod: 26 | Performed by: RADIOLOGY

## 2021-09-17 PROCEDURE — 250N000013 HC RX MED GY IP 250 OP 250 PS 637: Performed by: PHYSICIAN ASSISTANT

## 2021-09-17 PROCEDURE — 97535 SELF CARE MNGMENT TRAINING: CPT | Mod: GO | Performed by: OCCUPATIONAL THERAPIST

## 2021-09-17 PROCEDURE — 99231 SBSQ HOSP IP/OBS SF/LOW 25: CPT | Performed by: INTERNAL MEDICINE

## 2021-09-17 PROCEDURE — 36415 COLL VENOUS BLD VENIPUNCTURE: CPT | Performed by: PHYSICIAN ASSISTANT

## 2021-09-17 PROCEDURE — 72100 X-RAY EXAM L-S SPINE 2/3 VWS: CPT

## 2021-09-17 RX ORDER — METHOCARBAMOL 750 MG/1
750 TABLET, FILM COATED ORAL EVERY 6 HOURS PRN
Qty: 30 TABLET | Refills: 0 | Status: SHIPPED | OUTPATIENT
Start: 2021-09-17 | End: 2021-10-25

## 2021-09-17 RX ORDER — ACETAMINOPHEN 325 MG/1
650 TABLET ORAL EVERY 4 HOURS PRN
Qty: 100 TABLET | Refills: 0 | Status: SHIPPED | OUTPATIENT
Start: 2021-09-18 | End: 2021-10-18

## 2021-09-17 RX ORDER — HYDROMORPHONE HYDROCHLORIDE 2 MG/1
2-4 TABLET ORAL EVERY 4 HOURS PRN
Qty: 40 TABLET | Refills: 0 | Status: SHIPPED | OUTPATIENT
Start: 2021-09-17 | End: 2021-10-25

## 2021-09-17 RX ORDER — HYDROXYZINE HYDROCHLORIDE 25 MG/1
25 TABLET, FILM COATED ORAL EVERY 6 HOURS PRN
Qty: 30 TABLET | Refills: 0 | Status: SHIPPED | OUTPATIENT
Start: 2021-09-17 | End: 2021-10-25

## 2021-09-17 RX ORDER — AMOXICILLIN 250 MG
1 CAPSULE ORAL 2 TIMES DAILY PRN
Qty: 30 TABLET | Refills: 0 | Status: SHIPPED | OUTPATIENT
Start: 2021-09-17 | End: 2021-10-25

## 2021-09-17 RX ADMIN — METHOCARBAMOL 750 MG: 750 TABLET ORAL at 07:36

## 2021-09-17 RX ADMIN — POLYETHYLENE GLYCOL 3350 17 G: 17 POWDER, FOR SOLUTION ORAL at 08:03

## 2021-09-17 RX ADMIN — METHOCARBAMOL 750 MG: 750 TABLET ORAL at 13:46

## 2021-09-17 RX ADMIN — HYDROXYZINE HYDROCHLORIDE 25 MG: 25 TABLET, FILM COATED ORAL at 12:53

## 2021-09-17 RX ADMIN — ACETAMINOPHEN 975 MG: 325 TABLET, FILM COATED ORAL at 12:28

## 2021-09-17 RX ADMIN — METHOCARBAMOL 750 MG: 750 TABLET ORAL at 00:16

## 2021-09-17 RX ADMIN — HYDROMORPHONE HYDROCHLORIDE 4 MG: 2 TABLET ORAL at 01:23

## 2021-09-17 RX ADMIN — HYDROMORPHONE HYDROCHLORIDE 4 MG: 2 TABLET ORAL at 10:30

## 2021-09-17 RX ADMIN — HYDROMORPHONE HYDROCHLORIDE 4 MG: 2 TABLET ORAL at 05:15

## 2021-09-17 RX ADMIN — ACETAMINOPHEN 975 MG: 325 TABLET, FILM COATED ORAL at 04:13

## 2021-09-17 RX ADMIN — DOCUSATE SODIUM AND SENNOSIDES 1 TABLET: 8.6; 5 TABLET ORAL at 08:02

## 2021-09-17 NOTE — PROGRESS NOTES
"[facility], [place]   Internal Medicine Daily Note           Interval History/Events     Overnight events reviewed  Reports doing well  No fever, chills, nausea, vomiting, chest pain, shortness of breath       Review of Systems        4 point ROS including Respiratory, CV, GI and , other than that noted above is negative      Medications   I have reviewed current medications  in the \"current medication\" section of Epic.  Relevant changes include:     Physical Exam   General:       Vital signs:    Blood pressure 117/68, pulse 77, temperature 99.8  F (37.7  C), temperature source Oral, resp. rate 16, height 1.676 m (5' 6\"), weight 73.8 kg (162 lb 11.2 oz), last menstrual period 10/18/2012, SpO2 96 %, not currently breastfeeding.  Estimated body mass index is 26.26 kg/m  as calculated from the following:    Height as of this encounter: 1.676 m (5' 6\").    Weight as of this encounter: 73.8 kg (162 lb 11.2 oz).      Intake/Output Summary (Last 24 hours) at 9/16/2021 1430  Last data filed at 9/16/2021 0617  Gross per 24 hour   Intake 300 ml   Output 1110 ml   Net -810 ml        Constitutional: Laying in bed in no acute distress  Eye: No icterus, no pallor  Mouth/ENT: Normal oral mucosa  Cardiovascular: S1, S2 normal.   Respiratory: B/L CTA  GI: Soft, NT, BS+  : No gloria's catheter  Neurology: Alert, awake, and oriented.   Psych: Mood stable.   MSK:   Integumentary:   Heme/Lymph/Imm:      Laboratory and Imaging Studies     I have reviewed  laboratory and imaging studies in the Epic. Pertinent findings are as below:    BMP  Recent Labs   Lab 09/16/21  0800 09/15/21  0555   GLC 94 107*     CBC  Recent Labs   Lab 09/17/21  0705   HGB 9.9*     INRNo lab results found in last 7 days.  LFTsNo lab results found in last 7 days.   PANCNo lab results found in last 7 days.        Impression/Plan          61 year old woman admitted on 9/15/2021. She has a history of GERD, spondylolisthesis with " neurogenic claudication s/p transforaminal lumbar interbody fusion L4-5 on 9/15.     # s/p transforaminal lumbar interbody fusion L4-5:   Ortho managing as primary team  Pain well controlled with hydromorphone  No NSAIDs x 3 months per orthopedics    # Anemia: Likely d/t blood loss  Hg level 9.9 gm/dl on 09/17  - Transfuse if Hg less than 7 gm/dl      # History of GERD  - Continue home home esomeprazole (substituted as Protonix)            Pt's care was discussed with bedside RN, patient and  during Care Team Rounds.               Max Hyde MD  Hospitalist ( Internal medicine)  Pager: [phone]

## 2021-09-17 NOTE — PROGRESS NOTES
"Orthopaedic Surgery Progress Note   09/17/2021    Subjective: No acute events overnight, some anterior thigh pain yesterday but controlled today. Pain well controlled. Tolerating diet. Gloria still in . +Flatus, no BM. Denies fever or chills, CP, SOB, numbness or tingling, motor dysfunction or weakness. C/o pain over drain site.      Objective: /68 (BP Location: Left arm)   Pulse 74   Temp 99.7  F (37.6  C) (Oral)   Resp 15   Ht 1.676 m (5' 6\")   Wt 73.8 kg (162 lb 11.2 oz)   LMP 10/18/2012   SpO2 98%   BMI 26.26 kg/m      General: NAD, alert and oriented, cooperative with exam   Respiratory: Non-labored breathing  MSK: 5/5 iliopsoas, 5/5 quadriceps, 5/5 hamstrings, 5/5 anterior tibialis, 5/5 extensor hallucis longus, 5/5 gastrocnemius.    Neuro: sensation grossly intact  Incision: clean, dry, intact with dressing. Drains with serosanguineous output.  Surgical drain removed- pain resolved after removal    I/O last 3 completed shifts:  In: -   Out: 5210 [Urine:5150; Drains:60]  Labs:   Recent Results (from the past 24 hour(s))   Hemoglobin    Collection Time: 09/16/21  8:00 AM   Result Value Ref Range    Hemoglobin 10.3 (L) 11.7 - 15.7 g/dL   Glucose    Collection Time: 09/16/21  8:00 AM   Result Value Ref Range    Glucose 94 70 - 99 mg/dL       Imaging: pending    Assessment and Plan:   Mare Dominguez is a 61 year old female s/p decompression TLIF L4-5 on 9/15/21 with Dr. Romero. Doing well.    Today  - gloria out  - upright XR  - BM  - if the above is done and passes therapy- discharge home    Orthopedic Primary  Activity: Up with assist until independent. No excessive bending or twisting. No lifting >10 lbs x 6 weeks. Encourage ambulation.    Weight bearing status: WBAT.  Pain management:   Oxycodone PO, IV dilaudid prn, APAP, muscle relaxants   Antibiotics: Antibiotics x 24 hours - done  Diet: Begin with clear fluids and progress diet as tolerated.   DVT prophylaxis: SCDs only. No chemical DVT ppx " needed.  Imaging: XR Upright today.  Labs:  prn  Bracing/Splinting: None.  Dressings: Keep dressing c/d/i x 7 days  Drains: remove today.  Yu catheter: Remove today  Physical Therapy/Occupational Therapy: Eval and treat.  Cultures: none.    Consults: Hospitalist.  Follow-up: Clinic with Dr. Romero in 6 weeks with repeat x-rays.   Disposition: Pending progress with therapies, pain control on orals, and medical stability, anticipate discharge to home today.    9/17/2021 7:41 AM  Orthopaedic Surgery     Thank you for allowing me to participate in this patient's care. Please page me directly any questions/concerns.   Securely message with the Vocera Web Console (learn more here)  Text page via [employer]    If there is no response, if it is a weekend, or if it is during evening hours, please page the orthopaedic surgery resident on call via [employer] Paging/Directory

## 2021-09-17 NOTE — PLAN OF CARE
"      VS:   /61   Pulse 74   Temp 98.1  F (36.7  C) (Oral)   Resp 16   Ht 1.676 m (5' 6\")   Wt 73.8 kg (162 lb 11.2 oz)   LMP 10/18/2012   SpO2 97%   BMI 26.26 kg/m       Output:   Yu in place. LBM Tuesday. Not passing gas. BS audible.   Activity:   Assist of 1 with walker and gait belt.   Skin: Ex spine incision   Pain:   C/o moderate pain. Given Oral dilaudid 2-4mg every 4 hrs, robaxin, atarax and scheduled tylenol.   Neuro/CMS:   A/Ox4. CMS intact.   Dressing(s):   Dressing marked-unchanged.   Diet:   Regular,  tolerating.   LDA:   PIV infusing TKO.   Equipment:   IV pole, call light.    Plan:   Pt is able to make needs known and call light in reach   Additional Info:   Hemovac  output 50ml       "

## 2021-09-17 NOTE — PLAN OF CARE
Occupational Therapy Discharge Summary    Reason for therapy discharge:    All goals and outcomes met, no further needs identified.    Progress towards therapy goal(s). See goals on Care Plan in [facility] electronic health record for goal details.  Goals met    Therapy recommendation(s):    Continue home exercise program.

## 2021-09-17 NOTE — DISCHARGE SUMMARY
Orthopedic Surgery Discharge Summary    Date of service: 9/17/2021     Admission date: 9/15/2021    Discharge date: 9/17/2021     Service: Orthopedic Spine Surgery    Admitting Physician:  Dr. Romero    Admitting diagnosis:    Flatback syndrome, spondylolisthesis with spinal stenosis and instability    Discharge Diagnosis:   same as above        Procedures:   L4-L5 SPO, TLIF and posterior fusion     Complications:  none    Consults: Internal Medicine    Pre Operative History:   The patient is a 61-year-old female who has had back and radicular symptoms, right greater than left, for several years, worsening since the springtime with radiating pains.  She has essentially unlimited sitting, but she has pain with walking, which radiates into a sciatic nerve distribution.  Imaging showed that she had a degenerative spondylolisthesis, which had significant mobility on flexion and extension.  She had a high fluid signal within the facet joints at L4-5 and significant central and lateral recess stenosis with marked facet arthropathy.  Her lordosis distribution index was significantly decreased as she has had a history in the past of a lumbar diskectomy.      Hospital Course:   Mare was admitted on 9/15/2021 and underwent above-stated procedure. There were no intra-operative complications and procedure was well-tolerated.  Please see operative report for full detail.    Post-operatively Mare was admitted to the neuro floor and had improvement of pre-operative symptoms of leg pain.  Mare was able to have adequate pain control on oral pain medications. she was tolerating a general diet without nausea or vomiting. No problems with voiding were noted and patient is passing flatus. Mare was seen by physical therapy and occupational therapy and was tolerating treatments well and ambulating without concerns. she was discharged from inpatient therapy. Incision healing well without any signs of infection. Drains removed prior to  "discharge after low output. Antibiotics given annemarie-operatively. Vital signs and labs have remained stable. Hospitalist service did follow Mare during hospital stay for postoperative medical managment.     Post-operative upright films done showing anatomic alignment and implants showing no migration or loosening.    Disposition:  Mare was discharged to home in good condition    Labs:  Recent Labs   Lab Test 09/17/21  0705 09/16/21  0800 08/20/21  0824 08/20/21  0823 10/21/20  0755 08/09/19  1602   HGB 9.9* 10.3*  --  12.8  --  11.7   HCT  --   --   --  38.3  --  37.3   MCV  --   --   --  93  --  98   PLT  --   --   --  237  --  247   NA  --   --  138  --  135 140       Physical Exam:  /68 (BP Location: Right arm)   Pulse 77   Temp 99.8  F (37.7  C) (Oral)   Resp 16   Ht 1.676 m (5' 6\")   Wt 73.8 kg (162 lb 11.2 oz)   LMP 10/18/2012   SpO2 96%   BMI 26.26 kg/m    General:  in no acute distress.  Alert and oriented x 3  Incision: clean, dry and intact. Healing well.  Neuro: CN II-XII intact. Speech clear and fluent in character and context. Sensation intact on extremities.   Respiratory:  Breathing unlabored  Abdomen: no distention noted   Skin:  No areas of skin breakdown or ulceration  Musculoskeletal: confrontation testing reveals 5/5 iliopsoas, 5/5 quadriceps, 5/5 hamstrings, 5/5 anterior tibialis, 5/5 extensor hallucis longus, 5/5 gastrocnemius.      Discharge medications:  Current Discharge Medication List        START taking these medications    Details   acetaminophen (TYLENOL) 325 MG tablet Take 2 tablets (650 mg) by mouth every 4 hours as needed for pain (For optimal non-opioid multimodal pain management to improve pain control.)  Qty: 100 tablet, Refills: 0    Associated Diagnoses: Acute post-operative pain      HYDROmorphone (DILAUDID) 2 MG tablet Take 1-2 tablets (2-4 mg) by mouth every 4 hours as needed for severe pain  Qty: 40 tablet, Refills: 0    Associated Diagnoses: Acute post-operative " pain      hydrOXYzine (ATARAX) 25 MG tablet Take 1 tablet (25 mg) by mouth every 6 hours as needed for itching  Qty: 30 tablet, Refills: 0    Associated Diagnoses: Acute post-operative pain      methocarbamol (ROBAXIN) 750 MG tablet Take 1 tablet (750 mg) by mouth every 6 hours as needed for muscle spasms  Qty: 30 tablet, Refills: 0    Associated Diagnoses: Acute post-operative pain      senna-docusate (SENOKOT-S/PERICOLACE) 8.6-50 MG tablet Take 1 tablet by mouth 2 times daily as needed for constipation  Qty: 30 tablet, Refills: 0    Associated Diagnoses: Acute post-operative pain           CONTINUE these medications which have NOT CHANGED    Details   Calcium Carbonate-Vitamin D (CALCIUM + D) 600-200 MG-UNIT per tablet Take 2 tablets by mouth daily       CRANBERRY PO Take 30,000 mg by mouth 2 times daily       esomeprazole (NEXIUM) 20 MG DR capsule Take 20 mg by mouth daily as needed Take 30-60 minutes before eating.      Flaxseed, Linseed, (FLAX SEED OIL) 1000 MG capsule Take 1 capsule by mouth 2 times daily       GLUCOSAMINE 500 MG OR CAPS Take 1,500 mg by mouth 2 times daily       magnesium 250 MG tablet Take 1 tablet by mouth every evening       MIRALAX OR POWD STIR 1 CAPFUL (17GM) IN 8 OZ OF LIQUID AND DRINK  Qty: 1 Bottle, Refills: prn    Comments: Generic polyethylene glycol ok  Associated Diagnoses: Unspecified constipation             Discharge instructions:  Discharge Information:  Condition at discharge: Stable  Discharge destination:  Discharged to home       Instructions were given to call clinic or hospital for any questions or concerns, especially change in neurological status or signs of infection.    Carolyn King PA-C

## 2021-09-17 NOTE — PLAN OF CARE
"      VS: /68 (BP Location: Left arm)   Pulse 74   Temp 99.7  F (37.6  C) (Oral)   Resp 15   Ht 1.676 m (5' 6\")   Wt 73.8 kg (162 lb 11.2 oz)   LMP 10/18/2012   SpO2 98%   BMI 26.26 kg/m       Output: Yu: 3100 mL  LBM: 9/14 - BS hypoactive, not passing gas yet  HV: 10 mL   Lungs: Clear bilaterally, on RA. Denies chest pain or SOB.    Activity: Not OOB overnight. Assist x1 w/ walker & GB. Repos ind.   Skin: WDL ex spine incision   Pain:   Managed well w/ PRN Dilaudid 4 mg q4h and Robaxin q6h. Scheduled Tylenol. Aqua K machine   Neuro/CMS:   A & O x4. CMS intact. Denies N/T.    Dressing(s):   Spine drsg - scant drainage   Diet: Regular diet - good appetite   LDA:   R PIV - infusing TKO   Equipment:   IV pole, walker, GB   Plan:   Remove Yu today, encourage activity and control pain. Continue w/ POC.    Additional Info:   Needs x-rays after drains out      "

## 2021-09-17 NOTE — PLAN OF CARE
"      VS:   /68 (BP Location: Right arm)   Pulse 77   Temp 99.8  F (37.7  C) (Oral)   Resp 16   Ht 1.676 m (5' 6\")   Wt 73.8 kg (162 lb 11.2 oz)   LMP 10/18/2012   SpO2 96%   BMI 26.26 kg/m      RA   Output:   Pt voids bladder appropriately. LBM 9/15 before surgery. Pt is passing gas.   Activity:   Up ad concetta.   Skin: Posterior lumbar incision   Pain:   Pt rates mild to moderate pain. Pt given tylenol, dilaudid and robaxin.   Neuro/CMS:   A/Ox4, no n/t   Dressing(s):   Posterior lumbar - CDI   Diet:   Regular   LDA:   R PIV - SL   Equipment:   IV pole, IV pump   Plan:   Discharge today.   Additional Info:   Pt. discharged at 2:15pm to home, was accompanied by spouse, and left with personal belongings. Pt. received complete discharge paperwork and opiate medications as filled by discharge pharmacy. Pt. was given times of last dose for all discharge medications in writing on discharge medication sheets. Discharge teaching included opiate medication, pain management, activity restrictions, dressing changes, and signs and symptoms of infection. Pt. to follow up with [facility] on Oct 26. Pt. had no further questions at the time of discharge and no unmet needs were identified.         "

## 2021-09-20 ENCOUNTER — PATIENT OUTREACH (OUTPATIENT)
Dept: FAMILY MEDICINE | Facility: CLINIC | Age: 61
End: 2021-09-20

## 2021-09-20 NOTE — TELEPHONE ENCOUNTER
"Hospital/TCU/ED for chronic condition Discharge Protocol    \"Hi, my name is Yaz Grimes RN, a registered nurse, and I am calling from [facility].  I am calling to follow up and see how things are going for you after your recent emergency visit/hospital/TCU stay.\"    Tell me how you are doing now that you are home?\" Patient reports that she is doing well. She tried to taper off of the pain medication too soon, but is doing well now. She does not need a follow up appointment at this time.  aYz Grimes RN                   "

## 2021-09-24 ENCOUNTER — PRE VISIT (OUTPATIENT)
Dept: SURGERY | Facility: CLINIC | Age: 61
End: 2021-09-24

## 2021-10-19 DIAGNOSIS — Z98.1 S/P SPINAL FUSION: Primary | ICD-10-CM

## 2021-10-25 ENCOUNTER — OFFICE VISIT (OUTPATIENT)
Dept: FAMILY MEDICINE | Facility: CLINIC | Age: 61
End: 2021-10-25
Payer: COMMERCIAL

## 2021-10-25 VITALS
BODY MASS INDEX: 26.68 KG/M2 | OXYGEN SATURATION: 99 % | SYSTOLIC BLOOD PRESSURE: 132 MMHG | WEIGHT: 166 LBS | DIASTOLIC BLOOD PRESSURE: 78 MMHG | HEIGHT: 66 IN | HEART RATE: 70 BPM | TEMPERATURE: 97.2 F | RESPIRATION RATE: 14 BRPM

## 2021-10-25 DIAGNOSIS — Z00.00 ROUTINE GENERAL MEDICAL EXAMINATION AT A HEALTH CARE FACILITY: Primary | ICD-10-CM

## 2021-10-25 DIAGNOSIS — Z12.4 SCREENING FOR CERVICAL CANCER: ICD-10-CM

## 2021-10-25 DIAGNOSIS — D64.9 POSTOPERATIVE ANEMIA: ICD-10-CM

## 2021-10-25 DIAGNOSIS — Z23 NEED FOR VACCINATION: ICD-10-CM

## 2021-10-25 LAB
CHOLEST SERPL-MCNC: 212 MG/DL
FASTING STATUS PATIENT QL REPORTED: YES
HDLC SERPL-MCNC: 97 MG/DL
HGB BLD-MCNC: 11.9 G/DL (ref 11.7–15.7)
LDLC SERPL CALC-MCNC: 103 MG/DL
NONHDLC SERPL-MCNC: 115 MG/DL
TRIGL SERPL-MCNC: 60 MG/DL

## 2021-10-25 PROCEDURE — 80061 LIPID PANEL: CPT | Performed by: FAMILY MEDICINE

## 2021-10-25 PROCEDURE — 90682 RIV4 VACC RECOMBINANT DNA IM: CPT | Performed by: FAMILY MEDICINE

## 2021-10-25 PROCEDURE — 90472 IMMUNIZATION ADMIN EACH ADD: CPT | Performed by: FAMILY MEDICINE

## 2021-10-25 PROCEDURE — 99396 PREV VISIT EST AGE 40-64: CPT | Mod: 25 | Performed by: FAMILY MEDICINE

## 2021-10-25 PROCEDURE — G0145 SCR C/V CYTO,THINLAYER,RESCR: HCPCS | Performed by: FAMILY MEDICINE

## 2021-10-25 PROCEDURE — 87624 HPV HI-RISK TYP POOLED RSLT: CPT | Performed by: FAMILY MEDICINE

## 2021-10-25 PROCEDURE — 90471 IMMUNIZATION ADMIN: CPT | Performed by: FAMILY MEDICINE

## 2021-10-25 PROCEDURE — 36415 COLL VENOUS BLD VENIPUNCTURE: CPT | Performed by: FAMILY MEDICINE

## 2021-10-25 PROCEDURE — 85018 HEMOGLOBIN: CPT | Performed by: FAMILY MEDICINE

## 2021-10-25 PROCEDURE — 90715 TDAP VACCINE 7 YRS/> IM: CPT | Performed by: FAMILY MEDICINE

## 2021-10-25 ASSESSMENT — ENCOUNTER SYMPTOMS
COUGH: 0
FEVER: 0
FREQUENCY: 0
SORE THROAT: 0
PALPITATIONS: 0
HEARTBURN: 0
PARESTHESIAS: 0
HEMATOCHEZIA: 0
DIZZINESS: 0
DIARRHEA: 0
DYSURIA: 0
ARTHRALGIAS: 0
NERVOUS/ANXIOUS: 0
NAUSEA: 0
HEMATURIA: 0
WEAKNESS: 0
CHILLS: 0
SHORTNESS OF BREATH: 0
EYE PAIN: 0
BREAST MASS: 0
MYALGIAS: 0
ABDOMINAL PAIN: 0
CONSTIPATION: 0
JOINT SWELLING: 0
HEADACHES: 0

## 2021-10-25 ASSESSMENT — PAIN SCALES - GENERAL: PAINLEVEL: NO PAIN (0)

## 2021-10-25 ASSESSMENT — MIFFLIN-ST. JEOR: SCORE: 1339.72

## 2021-10-25 NOTE — PATIENT INSTRUCTIONS
Our Clinic hours are:  Mondays    7:20 am - 7 pm  Tues - Fri  7:20 am - 5 pm    Clinic Phone: [phone]    The clinic lab opens at 7:30 am Mon - Fri and appointments are required.    [facility]. [phone]  Monday  8 am - 7pm  Tues - Fri 8 am - 5:30 pm         Preventive Health Recommendations  Female Ages 50 - 64    Yearly exam: See your health care provider every year in order to  o Review health changes.   o Discuss preventive care.    o Review your medicines if your doctor has prescribed any.      Get a Pap test every three years (unless you have an abnormal result and your provider advises testing more often).    If you get Pap tests with HPV test, you only need to test every 5 years, unless you have an abnormal result.     You do not need a Pap test if your uterus was removed (hysterectomy) and you have not had cancer.    You should be tested each year for STDs (sexually transmitted diseases) if you're at risk.     Have a mammogram every 1 to 2 years.    Have a colonoscopy at age 50, or have a yearly FIT test (stool test). These exams screen for colon cancer.      Have a cholesterol test every 5 years, or more often if advised.    Have a diabetes test (fasting glucose) every three years. If you are at risk for diabetes, you should have this test more often.     If you are at risk for osteoporosis (brittle bone disease), think about having a bone density scan (DEXA).    Shots: Get a flu shot each year. Get a tetanus shot every 10 years.    Nutrition:     Eat at least 5 servings of fruits and vegetables each day.    Eat whole-grain bread, whole-wheat pasta and brown rice instead of white grains and rice.    Get adequate Calcium and Vitamin D.     Lifestyle    Exercise at least 150 minutes a week (30 minutes a day, 5 days a week). This will help you control your weight and prevent disease.    Limit alcohol to one drink per day.    No smoking.     Wear sunscreen to prevent skin  cancer.     See your dentist every six months for an exam and cleaning.    See your eye doctor every 1 to 2 years.

## 2021-10-25 NOTE — PROGRESS NOTES
SUBJECTIVE:   CC: Mare Dominguez is an 61 year old woman who presents for preventive health visit.       Patient has been advised of split billing requirements and indicates understanding: Yes  Healthy Habits:     Getting at least 3 servings of Calcium per day:  Yes    Bi-annual eye exam:  NO    Dental care twice a year:  NO    Sleep apnea or symptoms of sleep apnea:  None    Diet:  Regular (no restrictions)    Frequency of exercise:  4-5 days/week    Duration of exercise:  Greater than 60 minutes    Taking medications regularly:  Yes    Medication side effects:  None    PHQ-2 Total Score: 0    Additional concerns today:  Yes              Today's PHQ-2 Score:   PHQ-2 ( 1999 [employer]) 10/25/2021   Q1: Little interest or pleasure in doing things 0   Q2: Feeling down, depressed or hopeless 0   PHQ-2 Score 0   Q1: Little interest or pleasure in doing things Not at all   Q2: Feeling down, depressed or hopeless Not at all   PHQ-2 Score 0       Abuse: Current or Past (Physical, Sexual or Emotional) - No  Do you feel safe in your environment? Yes        Social History     Tobacco Use     Smoking status: Never Smoker     Smokeless tobacco: Never Used   Substance Use Topics     Alcohol use: Yes     Comment: every day, 1-2     If you drink alcohol do you typically have >3 drinks per day or >7 drinks per week? No    Alcohol Use 10/25/2021   Prescreen: >3 drinks/day or >7 drinks/week? Yes   Prescreen: >3 drinks/day or >7 drinks/week? -   AUDIT SCORE  7     AUDIT - Alcohol Use Disorders Identification Test - Reproduced from the World Health Organization Audit 2001 (Second Edition) 10/25/2021   1.  How often do you have a drink containing alcohol? 4 or more times a week   2.  How many drinks containing alcohol do you have on a typical day when you are drinking? 1 or 2   3.  How often do you have five or more drinks on one occasion? Weekly   4.  How often during the last year have you found that you were not able to stop  drinking once you had started? Never   5.  How often during the last year have you failed to do what was normally expected of you because of drinking? Never   6.  How often during the last year have you needed a first drink in the morning to get yourself going after a heavy drinking session? Never   7.  How often during the last year have you had a feeling of guilt or remorse after drinking? Never   8.  How often during the last year have you been unable to remember what happened the night before because of your drinking? Never   9.  Have you or someone else been injured because of your drinking? No   10. Has a relative, friend, doctor or other health care worker been concerned about your drinking or suggested you cut down? No   TOTAL SCORE 7       Reviewed orders with patient.  Reviewed health maintenance and updated orders accordingly - Yes  Lab work is in process    Breast Cancer Screening:    Breast CA Risk Assessment (FHS-7) 10/25/2021   Do you have a family history of breast, colon, or ovarian cancer? No / Unknown           Pertinent mammograms are reviewed under the imaging tab.    History of abnormal Pap smear: NO - age 30-65 PAP every 5 years with negative HPV co-testing recommended  PAP / HPV Latest Ref Rng & Units 10/5/2018 7/28/2017 11/21/2014   PAP (Historical) - NIL NIL NIL   HPV16 NEG:Negative Negative Negative -   HPV18 NEG:Negative Negative Negative -   HRHPV NEG:Negative Negative Positive(A) -     Reviewed and updated as needed this visit by clinical staff  Tobacco  Allergies  Meds   Med Hx  Surg Hx  Fam Hx  Soc Hx        Reviewed and updated as needed this visit by Provider                    Review of Systems   Constitutional: Negative for chills and fever.   HENT: Negative for congestion, ear pain, hearing loss and sore throat.    Eyes: Negative for pain and visual disturbance.   Respiratory: Negative for cough and shortness of breath.    Cardiovascular: Negative for chest pain,  "palpitations and peripheral edema.   Gastrointestinal: Negative for abdominal pain, constipation, diarrhea, heartburn, hematochezia and nausea.   Breasts:  Negative for tenderness, breast mass and discharge.   Genitourinary: Negative for dysuria, frequency, genital sores, hematuria, pelvic pain, urgency, vaginal bleeding and vaginal discharge.   Musculoskeletal: Negative for arthralgias, joint swelling and myalgias.   Skin: Negative for rash.   Neurological: Negative for dizziness, weakness, headaches and paresthesias.   Psychiatric/Behavioral: Negative for mood changes. The patient is not nervous/anxious.           OBJECTIVE:   /78   Pulse 70   Temp 97.2  F (36.2  C) (Tympanic)   Resp 14   Ht 1.676 m (5' 6\")   Wt 75.3 kg (166 lb)   LMP 10/18/2012   SpO2 99%   Breastfeeding No   BMI 26.79 kg/m    Physical Exam  GENERAL: healthy, alert and no distress  EYES: Eyes grossly normal to inspection, PERRL and conjunctivae and sclerae normal  HENT: ear canals and TM's normal, nose and mouth without ulcers or lesions  NECK: no adenopathy, no asymmetry, masses, or scars and thyroid normal to palpation  RESP: lungs clear to auscultation - no rales, rhonchi or wheezes  BREAST: normal without masses, tenderness or nipple discharge and no palpable axillary masses or adenopathy  CV: regular rate and rhythm, normal S1 S2, no S3 or S4, no murmur, click or rub, no peripheral edema and peripheral pulses strong  ABDOMEN: soft, nontender, no hepatosplenomegaly, no masses and bowel sounds normal   (female): normal female external genitalia, normal urethral meatus , vaginal mucosa pink, moist, well rugated, normal cervix, adnexae, and uterus without masses. and pap obtained  MS: no gross musculoskeletal defects noted, no edema  SKIN: no suspicious lesions or rashes  NEURO: Normal strength and tone, mentation intact and speech normal  PSYCH: mentation appears normal, affect normal/bright    Diagnostic Test Results:  Labs " "reviewed in Epic    ASSESSMENT/PLAN:   (Z00.00) Routine general medical examination at a health care facility  (primary encounter diagnosis)  Comment:    Plan: PAP screen with HPV - recommended age 30 - 65         years, Lipid panel reflex to direct LDL Fasting             (Z23) Need for vaccination  Comment:    Plan: TDAP VACCINE (Adacel, Boostrix)  [[id number]]             (Z12.4) Screening for cervical cancer  Comment:    Plan:      (D64.9) Postoperative anemia  Comment:    Plan: Hemoglobin               Patient has been advised of split billing requirements and indicates understanding: Yes  COUNSELING:  Reviewed preventive health counseling, as reflected in patient instructions       Regular exercise    Estimated body mass index is 26.79 kg/m  as calculated from the following:    Height as of this encounter: 1.676 m (5' 6\").    Weight as of this encounter: 75.3 kg (166 lb).    Weight management plan: Discussed healthy diet and exercise guidelines    She reports that she has never smoked. She has never used smokeless tobacco.      Counseling Resources:  ATP IV Guidelines  Pooled Cohorts Equation Calculator  Breast Cancer Risk Calculator  BRCA-Related Cancer Risk Assessment: FHS-7 Tool  FRAX Risk Assessment  ICSI Preventive Guidelines  Dietary Guidelines for Americans, 2010  USDA's MyPlate  ASA Prophylaxis  Lung CA Screening    Carley Kern MD  [facility]  "

## 2021-10-26 ENCOUNTER — OFFICE VISIT (OUTPATIENT)
Dept: ORTHOPEDICS | Facility: CLINIC | Age: 61
End: 2021-10-26
Payer: COMMERCIAL

## 2021-10-26 ENCOUNTER — ANCILLARY PROCEDURE (OUTPATIENT)
Dept: GENERAL RADIOLOGY | Facility: CLINIC | Age: 61
End: 2021-10-26
Attending: PHYSICIAN ASSISTANT
Payer: COMMERCIAL

## 2021-10-26 DIAGNOSIS — Z98.1 S/P SPINAL FUSION: Primary | ICD-10-CM

## 2021-10-26 PROCEDURE — 72100 X-RAY EXAM L-S SPINE 2/3 VWS: CPT | Performed by: RADIOLOGY

## 2021-10-26 PROCEDURE — 99024 POSTOP FOLLOW-UP VISIT: CPT | Performed by: PHYSICIAN ASSISTANT

## 2021-10-26 NOTE — LETTER
10/26/2021         RE: Mare Dominguez  [address] [place] [address]        Dear Colleague,    Thank you for referring your patient, Mare Dominguez, to the [facility] [place]. Please see a copy of my visit note below.    I have reviewed and updated the patient's Past Medical History, Social History, Family History and Medication List.    ALLERGIES  Hydrocodone, Latex, Percocet [oxycodone-acetaminophen], and Sulfa drugs    Spine Surgery Follow Up    REFERRING PHYSICIAN: Referred Self   PRIMARY CARE PHYSICIAN: Carley Kern           Chief Complaint:   Procedure (DOS 9/15/21 S/P Decompression and transforaminal lumbar interbody fusion Lumbar 4 to 5)      History of Present Illness:  Symptom Profile Including: location of symptoms, onset, severity, exacerbating/alleviating factors, previous treatments:        Mare Dominguez is a 61 year old female who presents to clinic today for routine 6-week follow-up S/P decompression and TLIF L4-5 on 9/15/2021 with Dr. Romero.  Patient says that she is overall doing very well postoperatively.  She still feels somewhat stiff in her low back, and will need to rest and ice the back if she does too much during the day.  She has been walking 3 to 4 miles a day.  She is taking Tylenol as needed for pain management.  She says her incision is healed nicely.  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  No other complaints or concerns today.        ANASTACIA Scores:  Oswestry (ANASTACIA) Questionnaire    OSWESTRY DISABILITY INDEX 10/25/2021   Count 9   Sum 1   Oswestry Score (%) 2.22   Some recent data might be hidden      Visual Analog Pain Scale  Back Pain Scale 0-10: 0  Right leg pain: 0  Left leg pain: 0    PROMIS-10 Scores:  Global Mental Health Score: (P) 17  Global Physical Health Score: (P) 19  PROMIS TOTAL - SUBSCORES:  (P) 36         Physical Exam:   PHYSICAL EXAM:   Constitutional - Patient is healthy, well-nourished and appears stated age   Respiratory - Patient is breathing normally and in no respiratory distress.   Skin - No suspicious rashes or lesions.   Psychiatric - Normal mood and affect.   Cardiovascular - Extremities warm and well perfused.   Eyes - Visual acuity is normal to the written word.   ENT - Hearing intact to the spoken word.   GI - No abdominal distention.   Musculoskeletal - Non-antalgic gait without use of assistive devices.  Strength 5/5 bilateral lower extremities.  Midline lumbar surgical incision well-healed without evidence of infection.                  Imaging:   I ordered and independently reviewed new radiographs at this clinic visit. The results were discussed with the patient.  Findings include:  10/26/2021 XR lumbar spine AP/lateral views: Stable surgical hardware present from L4-5 without evidence of hardware failure or acute fracture.  Stable appearance of advanced degenerative changes to L5-S1 disc space.             Assessment and Plan:   Assessment:  61 year old female here for 6-week follow-up S/P decompression and TLIF L4-5 on 9/15/2021 with Dr. Romero; progressing better than expected.     Plan:  Congratulated patient on her progress so far. Reviewed x-rays which demonstrate stable fusion hardware without fracture or hardware complication. She can continue to be active with walking and also discussed that she could do stationary bike.  Advised against road bike in case of falling off the bike.  Okay to return to work with restrictions as discussed in clinic.  She can call us if she would like to advance to 8-hour days and we can reassess at that time.        - Filled out paperwork for patient's work.  Restrictions of maximum of 4 hours a day for 4 weeks.  Maximum lifting 20 pounds.  Avoid repetitive and excessive bending/lifting/twisting.  - Follow up in 6 weeks with repeat lumbar  AP/lateral view x-rays.    Respectfully,  Olivia Strange (herminio Lo), PAErinC  Orthopaedic Spine Surgery  Dept Orthopaedic Surgery, [facility] Phone: [phone]    Dictation Disclaimer: Some of this Note has been completed with voice-recognition dictation software. Although errors are generally corrected real-time, there is the potential for a rare error to be present in the completed chart.

## 2021-10-26 NOTE — PROGRESS NOTES
I have reviewed and updated the patient's Past Medical History, Social History, Family History and Medication List.    ALLERGIES  Hydrocodone, Latex, Percocet [oxycodone-acetaminophen], and Sulfa drugs    Spine Surgery Follow Up    REFERRING PHYSICIAN: Referred Self   PRIMARY CARE PHYSICIAN: Carley Kern           Chief Complaint:   Procedure (DOS 9/15/21 S/P Decompression and transforaminal lumbar interbody fusion Lumbar 4 to 5)      History of Present Illness:  Symptom Profile Including: location of symptoms, onset, severity, exacerbating/alleviating factors, previous treatments:        Mare Dominguez is a 61 year old female who presents to clinic today for routine 6-week follow-up S/P decompression and TLIF L4-5 on 9/15/2021 with Dr. Romero.  Patient says that she is overall doing very well postoperatively.  She still feels somewhat stiff in her low back, and will need to rest and ice the back if she does too much during the day.  She has been walking 3 to 4 miles a day.  She is taking Tylenol as needed for pain management.  She says her incision is healed nicely.  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  No other complaints or concerns today.        ANASTACIA Scores:  Oswestry (ANASTACIA) Questionnaire    OSWESTRY DISABILITY INDEX 10/25/2021   Count 9   Sum 1   Oswestry Score (%) 2.22   Some recent data might be hidden      Visual Analog Pain Scale  Back Pain Scale 0-10: 0  Right leg pain: 0  Left leg pain: 0    PROMIS-10 Scores:  Global Mental Health Score: (P) 17  Global Physical Health Score: (P) 19  PROMIS TOTAL - SUBSCORES: (P) 36         Physical Exam:   PHYSICAL EXAM:   Constitutional - Patient is healthy, well-nourished and appears stated age   Respiratory - Patient is breathing normally and in no respiratory distress.   Skin - No suspicious rashes or lesions.   Psychiatric - Normal mood and  affect.   Cardiovascular - Extremities warm and well perfused.   Eyes - Visual acuity is normal to the written word.   ENT - Hearing intact to the spoken word.   GI - No abdominal distention.   Musculoskeletal - Non-antalgic gait without use of assistive devices.  Strength 5/5 bilateral lower extremities.  Midline lumbar surgical incision well-healed without evidence of infection.                  Imaging:   I ordered and independently reviewed new radiographs at this clinic visit. The results were discussed with the patient.  Findings include:  10/26/2021 XR lumbar spine AP/lateral views: Stable surgical hardware present from L4-5 without evidence of hardware failure or acute fracture.  Stable appearance of advanced degenerative changes to L5-S1 disc space.             Assessment and Plan:   Assessment:  61 year old female here for 6-week follow-up S/P decompression and TLIF L4-5 on 9/15/2021 with Dr. Romero; progressing better than expected.     Plan:  Congratulated patient on her progress so far. Reviewed x-rays which demonstrate stable fusion hardware without fracture or hardware complication. She can continue to be active with walking and also discussed that she could do stationary bike.  Advised against road bike in case of falling off the bike.  Okay to return to work with restrictions as discussed in clinic.  She can call us if she would like to advance to 8-hour days and we can reassess at that time.        - Filled out paperwork for patient's work.  Restrictions of maximum of 4 hours a day for 4 weeks.  Maximum lifting 20 pounds.  Avoid repetitive and excessive bending/lifting/twisting.  - Follow up in 6 weeks with repeat lumbar AP/lateral view x-rays.    Respectfully,  Olivia Strange (herminio Lo)CORRY  Orthopaedic Spine Surgery  Dept Orthopaedic Surgery, [facility]  [facility] Phone: [phone]    Dictation Disclaimer: Some of this Note has been completed with voice-recognition dictation software.  Although errors are generally corrected real-time, there is the potential for a rare error to be present in the completed chart.

## 2021-10-26 NOTE — RESULT ENCOUNTER NOTE
Mare,        All of the labs were normal or acceptable.    The American Heart Association and American College of Cardiology recommends statins for anyone who's 10 year risk exceeds 7.5%, your risk is 2.5%, therefore a statin (cholesterol lowering drug) is NOT recommended.      The 10-year ASCVD risk score (Javier SUAZO Jr., et al., 2013) is: 2.5%    Values used to calculate the score:      Age: 61 years      Sex: Female      Is Non- : No      Diabetic: No      Tobacco smoker: No      Systolic Blood Pressure: 132 mmHg      Is BP treated: No      HDL Cholesterol: 97 mg/dL      Total Cholesterol: 212 mg/dL      Please contact my office if you have questions.    Carley Kern M.D.

## 2021-10-27 LAB
BKR LAB AP GYN ADEQUACY: NORMAL
BKR LAB AP GYN INTERPRETATION: NORMAL
BKR LAB AP HPV REFLEX: NO
BKR LAB AP PREVIOUS ABNORMAL: NORMAL
PATH REPORT.COMMENTS IMP SPEC: NORMAL
PATH REPORT.RELEVANT HX SPEC: NORMAL

## 2021-10-28 LAB
HUMAN PAPILLOMA VIRUS 16 DNA: NEGATIVE
HUMAN PAPILLOMA VIRUS 18 DNA: NEGATIVE
HUMAN PAPILLOMA VIRUS FINAL DIAGNOSIS: NORMAL
HUMAN PAPILLOMA VIRUS OTHER HR: NEGATIVE

## 2021-11-02 ENCOUNTER — HOSPITAL ENCOUNTER (OUTPATIENT)
Dept: PHYSICAL THERAPY | Facility: CLINIC | Age: 61
Setting detail: THERAPIES SERIES
End: 2021-11-02
Attending: PHYSICIAN ASSISTANT
Payer: COMMERCIAL

## 2021-11-02 DIAGNOSIS — Z98.1 S/P SPINAL FUSION: ICD-10-CM

## 2021-11-02 PROCEDURE — 97161 PT EVAL LOW COMPLEX 20 MIN: CPT | Mod: GP | Performed by: PHYSICAL THERAPIST

## 2021-11-02 PROCEDURE — 97110 THERAPEUTIC EXERCISES: CPT | Mod: GP | Performed by: PHYSICAL THERAPIST

## 2021-11-02 NOTE — PROGRESS NOTES
11/02/21 1400   General Information   Type of Visit Initial OP Ortho PT Evaluation   Start of Care Date 11/02/21   Referring Physician Olivia Strange   Patient/Family Goals Statement improve stiffness in the low back, improve ROM and strength, downhill skiing, camping, biking/kayaking    Orders Evaluate and Treat   Date of Order 10/26/21   Certification Required? No   Medical Diagnosis S/P spinal fusion    Surgical/Medical history reviewed Yes   Precautions/Limitations spinal precautions  (no lifting over 20# )   General Information Comments PMH: L4-5 fusion decompression, L ACL    Body Part(s)   Body Part(s) Lumbar Spine/SI   Presentation and Etiology   Pertinent history of current problem (include personal factors and/or comorbidities that impact the POC) DOS 9/15/21 S/P Decompression and transforaminal lumbar interbody fusion Lumbar 4 to 5.  Overall feeling pretty good in her back but just a bit stiff. Has been walking a few miles every day. Has been sticking to the restrictions.   Next f/u is 12/14/21. Pt is active skiier, camper, kayaking, hiker.    Impairments E. Decreased flexibility   Functional Limitations perform required work activities;perform desired leisure / sports activities   Symptom Location low back    How/Where did it occur   (s/p surgery )   Onset date of current episode/exacerbation 09/15/21   Chronicity New   Pain rating (0-10 point scale) Best (/10);Worst (/10)   Best (/10) 0   Worst (/10) 2   Pain quality C. Aching;H. Other  (stiff)   Frequency of pain/symptoms B. Intermittent   Pain/symptoms are: Worse during the day   Pain/symptoms exacerbated by A. Sitting   Pain/symptoms eased by C. Rest;E. Changing positions;B. Walking   Progression of symptoms since onset: Improved   Current / Previous Interventions   Diagnostic Tests: X-ray   X-ray Results Results   X-ray results see chart, L4-5 fusion    Current Level of Function   Patient role/employment history A. Employed   Employment Comments  works as a PA in Pediatrics    Fall Risk Screen   Fall screen completed by PT   Have you fallen 2 or more times in the past year? No   Have you fallen and had an injury in the past year? No   Is patient a fall risk? No   Abuse Screen (yes response referral indicated)   Feels Unsafe at Home or Work/School no   Feels Threatened by Someone no   Does Anyone Try to Keep You From Having Contact with Others or Doing Things Outside Your Home? no   Physical Signs of Abuse Present no   System Outcome Measures   Outcome Measures Low Back Pain (see Oswestry and Louise)   Lumbar Spine/SI Objective Findings   Integumentary incision healing well, no signs/symptoms of infection    Posture good posture, mild decrease in lumbar lordosis    Gait/Locomotion normal    Balance/Proprioception (Single Leg Stance) 10 sec B no LOB    Flexion ROM finger tips 12 inches from floor    Right Side Bending ROM finger tips 6 inches from knee joint line, stiffness    Left Side Bending ROM finger tips 6 inches from knee joint line, stiffness    Lumbar ROM Comment trunk rotation 25% limited: mild tightness B    Hip Screen hip flexion WNL B, ER 25% limited on R and normal on L, FADIR some pulling in piriformis    Transversus Abdominus Strength (Cuong Leg Lowering-deg) weakness, TrA + double leg lower able to get 1/2 way down    Hip Flexion (L2) Strength 4+/5 B    Hip Abduction Strength right: 4/5, left 4+/5    Hip Extension Strength right 4/5, left 4+/5    Knee Flexion Strength 5/5 B   Knee Extension (L3) Strength 5/5 B    Ankle Dorsiflexion (L4) Strength 5/5 B    Hamstring Flexibility mild/mod tightness B: 90/90: 25 degrees from 0 B    Hip Flexor Flexibility mild tightness B (pulling in low back with mild pain)    Piriformis Flexibility mod tightness right, normal L    SLR - B    Slump Test - B    Palpation mild increase in tone and pain with palpation over lower lumbar paraspinals on right side and along glut med on right    Planned Therapy  Interventions   Planned Therapy Interventions balance training;bed mobility training;gait training;joint mobilization;manual therapy;neuromuscular re-education;ROM;strengthening;stretching;transfer training   Clinical Impression   Criteria for Skilled Therapeutic Interventions Met yes, treatment indicated   PT Diagnosis decreased lumbar ROM    Influenced by the following impairments decreased lumbar ROM, muscle tightness, core weakness    Functional limitations due to impairments hiking, kayaking, work, bending,    Clinical Presentation Stable/Uncomplicated   Clinical Presentation Rationale clinical decision making and chart review    Clinical Decision Making (Complexity) Low complexity   Therapy Frequency 1 time/week   Predicted Duration of Therapy Intervention (days/wks) 6 weeks   Risk & Benefits of therapy have been explained Yes   Patient, Family & other staff in agreement with plan of care Yes   Clinical Impression Comments Mare is a 61 year old female who is s/p DOS 9/15/21 S/P Decompression and transforaminal lumbar interbody fusion Lumbar 4 to 5   Education Assessment   Preferred Learning Style Listening;Pictures/video;Demonstration   Barriers to Learning No barriers   ORTHO GOALS   PT Ortho Eval Goals 1;2;3;4   Ortho Goal 1   Goal Identifier 1   Goal Description Patient will be able to return to 1/2 days at work and report no stiffness or soreness in her low back with regular work activities.    Target Date 11/23/21   Ortho Goal 2   Goal Identifier 2   Goal Description Patient will be able to bend down to tie shoes without feeling any tightness in low back.    Target Date 11/23/21   Ortho Goal 3   Goal Identifier 3   Goal Description Patient will be independent with HEP in order to maxmize recovery and improve strength for long term positive results.    Target Date 12/14/21   Ortho Goal 4   Goal Identifier 4   Goal Description Patient will improve right hip abd strength to 4+/5 in order to decrease low back  strain with walking and hiking activities.    Target Date 12/14/21   Total Evaluation Time   PT Eval, Low Complexity Minutes ([id number]) 14       Susanna Broussard  PT, DPT       11/2/2021   [address] [address]  [email or web address]  [email or web address]: [phone]

## 2021-11-09 ENCOUNTER — HOSPITAL ENCOUNTER (OUTPATIENT)
Dept: PHYSICAL THERAPY | Facility: CLINIC | Age: 61
Setting detail: THERAPIES SERIES
End: 2021-11-09
Attending: PHYSICIAN ASSISTANT
Payer: COMMERCIAL

## 2021-11-09 PROCEDURE — 97110 THERAPEUTIC EXERCISES: CPT | Mod: GP | Performed by: PHYSICAL THERAPIST

## 2021-11-09 PROCEDURE — 97140 MANUAL THERAPY 1/> REGIONS: CPT | Mod: GP | Performed by: PHYSICAL THERAPIST

## 2021-11-24 ENCOUNTER — HOSPITAL ENCOUNTER (OUTPATIENT)
Dept: PHYSICAL THERAPY | Facility: CLINIC | Age: 61
Setting detail: THERAPIES SERIES
End: 2021-11-24
Attending: PHYSICIAN ASSISTANT
Payer: COMMERCIAL

## 2021-11-24 PROCEDURE — 97110 THERAPEUTIC EXERCISES: CPT | Mod: GP | Performed by: PHYSICAL THERAPIST

## 2021-11-24 NOTE — PROGRESS NOTES
Addendum: 2022   This note acts as a discharge note as patient did not need to return for continued therapy. At last attended visit when progress note was written, patient was meeting all their goals and the plan was to discharge if no new symptoms arose within 30 days.                                                                                [facility]    Outpatient Physical Therapy Progress Note  Patient: Mare Dominguez  : 1960    Beginning/End Dates of Reporting Period:  21 to 21    Referring Provider: Olivia Nixon Diagnosis: decreased lumbar ROM      Client Self Report: Not having any soreness with any activity     Objective Measurements:  Objective Measure: trunk flexion   Details: can reach toes with no pain  Objective Measure: strength   Details: plank 5 seconds from knees      Goals:  Goal Identifier 1   Goal Description Patient will be able to return to 1/2 days at work and report no stiffness or soreness in her low back with regular work activities.    Target Date 21   Date Met  21   Progress (detail required for progress note):       Goal Identifier 2   Goal Description Patient will be able to bend down to tie shoes without feeling any tightness in low back.    Target Date 21   Date Met  21   Progress (detail required for progress note):       Goal Identifier 3   Goal Description Patient will be independent with HEP in order to maxmize recovery and improve strength for long term positive results.    Target Date 21   Date Met  21   Progress (detail required for progress note):       Goal Identifier 4   Goal Description Patient will improve right hip abd strength to 4+/5 in order to decrease low back strain with walking and hiking activities.    Target Date 21   Date Met  21   Progress (detail required for progress note):         Plan:  Discharge from therapy in 30 days if patient  doesn't return.     Discharge:    Reason for Discharge: Patient has met all goals.    Equipment Issued: theraband    Discharge Plan: Patient to continue home program.

## 2021-12-10 DIAGNOSIS — Z98.1 S/P SPINAL FUSION: Primary | ICD-10-CM

## 2021-12-14 ENCOUNTER — ANCILLARY PROCEDURE (OUTPATIENT)
Dept: GENERAL RADIOLOGY | Facility: CLINIC | Age: 61
End: 2021-12-14
Attending: PHYSICIAN ASSISTANT
Payer: COMMERCIAL

## 2021-12-14 ENCOUNTER — OFFICE VISIT (OUTPATIENT)
Dept: ORTHOPEDICS | Facility: CLINIC | Age: 61
End: 2021-12-14
Payer: COMMERCIAL

## 2021-12-14 DIAGNOSIS — Z98.1 S/P SPINAL FUSION: Primary | ICD-10-CM

## 2021-12-14 DIAGNOSIS — Z98.1 S/P SPINAL FUSION: ICD-10-CM

## 2021-12-14 DIAGNOSIS — M43.10 DEGENERATIVE SPONDYLOLISTHESIS: ICD-10-CM

## 2021-12-14 PROCEDURE — 99024 POSTOP FOLLOW-UP VISIT: CPT | Performed by: PHYSICIAN ASSISTANT

## 2021-12-14 PROCEDURE — 72100 X-RAY EXAM L-S SPINE 2/3 VWS: CPT | Performed by: RADIOLOGY

## 2021-12-14 NOTE — LETTER
12/14/2021         RE: Mare Dominguze  [address] [facility] [address]        Dear Colleague,    Thank you for referring your patient, Mare Dominguez, to the [facility] [place]. Please see a copy of my visit note below.    I have reviewed and updated the patient's Past Medical History, Social History, Family History and Medication List.    ALLERGIES  Hydrocodone, Latex, Percocet [oxycodone-acetaminophen], and Sulfa drugs    Spine Surgery Follow Up    REFERRING PHYSICIAN: Referred Self   PRIMARY CARE PHYSICIAN: Carley Kern           Chief Complaint:   Surgical Followup (DOS 9/15/21 S/P Decompression and transforaminal lumbar interbody fusion Lumbar 4 to 5)      History of Present Illness:  Symptom Profile Including: location of symptoms, onset, severity, exacerbating/alleviating factors, previous treatments:        Mare Dominguez is a 61 year old female who presents today for routine 3 month follow up s/p decompression and TLIF L4-5 on 9/15/2021 with Dr. Romero. Last seen 10/26/21 for routine 6-week visit and she was doing well at that time.   Today, patient reports that she continues to do well in her recovery.  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  She notes that she has occasional stiffness in her low back when she does too much, but is overall very happy with surgical outcomes.  She is planning to go on a ski trip in February and is wondering about restrictions going forward.        ANASTACIA Scores:  Oswestry (ANASTACIA) Questionnaire    OSWESTRY DISABILITY INDEX 12/14/2021   Count 9   Sum 4   Oswestry Score (%) 8.89   Some recent data might be hidden            Neck Disability Index (NDI) Questionnaire    No flowsheet data found.         Visual Analog Pain Scale  Back Pain Scale 0-10: 0  Right leg pain: 0  Left leg pain: 0    PROMIS-10 Scores:  Global Mental Health Score: (P) 17  Global Physical Health  Score: (P) 19  PROMIS TOTAL - SUBSCORES: (P) 36         Physical Exam:   PHYSICAL EXAM:   Constitutional - Patient is healthy, well-nourished and appears stated age   Respiratory - Patient is breathing normally and in no respiratory distress.   Skin - No suspicious rashes or lesions.   Psychiatric - Normal mood and affect.   Cardiovascular - Extremities warm and well perfused.   Eyes - Visual acuity is normal to the written word.   ENT - Hearing intact to the spoken word.   GI - No abdominal distention.   Musculoskeletal - Non-antalgic gait without use of assistive devices.        Lumbar Spine:    Appearance - Normal    Motor -        LOWER EXTREMITY Left Right   Hip flexion 5/5 5/5   Knee flexion 5/5 5/5   Knee extension 5/5 5/5   Ankle dorsiflexion 5/5 5/5   Ankle plantarflexion 5/5 5/5   Great toe extension 5/5 5/5             Imaging:   I ordered and independently reviewed new radiographs at this clinic visit. The results were discussed with the patient.  Findings include:    12/15/2021 XR lumbar spine AP/lateral view: L4-5 fusion hardware present without evidence of loosening, fracture or migration. Stable appearance of degenerative changes at L5-S1.             Assessment and Plan:   Assessment:  61 year old female here for routine 3 month follow up s/p decompression and TLIF L4-5 on 9/15/2021 with Dr. Romero; progressing as expected     Plan:  Reviewed patient's updated XR images with her today.  Congratulated her on her progress so far.  Am happy that she has been able to work and participate in activities without difficulties with her back.  At this point, she can start slowly progressing activities as tolerated.  Discussed that when she goes skiing she should try to avoid high-impact activities such as moguls and jumps, but otherwise her back should be stable enough to ski without restrictions.  Follow-up in 3 months for routine 6-month follow-up with repeat AP/lateral view lumbar spine  x-ray    Respectfully,  Olivia Strange (herminio Lo), CORRY  Orthopaedic Spine Surgery  Dept Orthopaedic Surgery, [facility] Phone: [phone]    Dictation Disclaimer: Some of this Note has been completed with voice-recognition dictation software. Although errors are generally corrected real-time, there is the potential for a rare error to be present in the completed chart.

## 2021-12-14 NOTE — PROGRESS NOTES
I have reviewed and updated the patient's Past Medical History, Social History, Family History and Medication List.    ALLERGIES  Hydrocodone, Latex, Percocet [oxycodone-acetaminophen], and Sulfa drugs    Spine Surgery Follow Up    REFERRING PHYSICIAN: Referred Self   PRIMARY CARE PHYSICIAN: Carley Kern           Chief Complaint:   Surgical Followup (DOS 9/15/21 S/P Decompression and transforaminal lumbar interbody fusion Lumbar 4 to 5)      History of Present Illness:  Symptom Profile Including: location of symptoms, onset, severity, exacerbating/alleviating factors, previous treatments:        Mare Dominguez is a 61 year old female who presents today for routine 3 month follow up s/p decompression and TLIF L4-5 on 9/15/2021 with Dr. Romero. Last seen 10/26/21 for routine 6-week visit and she was doing well at that time.   Today, patient reports that she continues to do well in her recovery.  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  She notes that she has occasional stiffness in her low back when she does too much, but is overall very happy with surgical outcomes.  She is planning to go on a ski trip in February and is wondering about restrictions going forward.        ANASTACIA Scores:  Oswestry (ANASTACIA) Questionnaire    OSWESTRY DISABILITY INDEX 12/14/2021   Count 9   Sum 4   Oswestry Score (%) 8.89   Some recent data might be hidden            Neck Disability Index (NDI) Questionnaire    No flowsheet data found.         Visual Analog Pain Scale  Back Pain Scale 0-10: 0  Right leg pain: 0  Left leg pain: 0    PROMIS-10 Scores:  Global Mental Health Score: (P) 17  Global Physical Health Score: (P) 19  PROMIS TOTAL - SUBSCORES: (P) 36         Physical Exam:   PHYSICAL EXAM:   Constitutional - Patient is healthy, well-nourished and appears stated age   Respiratory - Patient is breathing normally and in no respiratory distress.   Skin - No suspicious rashes or  lesions.   Psychiatric - Normal mood and affect.   Cardiovascular - Extremities warm and well perfused.   Eyes - Visual acuity is normal to the written word.   ENT - Hearing intact to the spoken word.   GI - No abdominal distention.   Musculoskeletal - Non-antalgic gait without use of assistive devices.        Lumbar Spine:    Appearance - Normal    Motor -        LOWER EXTREMITY Left Right   Hip flexion 5/5 5/5   Knee flexion 5/5 5/5   Knee extension 5/5 5/5   Ankle dorsiflexion 5/5 5/5   Ankle plantarflexion 5/5 5/5   Great toe extension 5/5 5/5             Imaging:   I ordered and independently reviewed new radiographs at this clinic visit. The results were discussed with the patient.  Findings include:    12/15/2021 XR lumbar spine AP/lateral view: L4-5 fusion hardware present without evidence of loosening, fracture or migration. Stable appearance of degenerative changes at L5-S1.             Assessment and Plan:   Assessment:  61 year old female here for routine 3 month follow up s/p decompression and TLIF L4-5 on 9/15/2021 with Dr. Romero; progressing as expected     Plan:  Reviewed patient's updated XR images with her today.  Congratulated her on her progress so far.  Am happy that she has been able to work and participate in activities without difficulties with her back.  At this point, she can start slowly progressing activities as tolerated.  Discussed that when she goes skiing she should try to avoid high-impact activities such as moguls and jumps, but otherwise her back should be stable enough to ski without restrictions.  Follow-up in 3 months for routine 6-month follow-up with repeat AP/lateral view lumbar spine x-ray    Respectfully,  Olivia Strange (herminio Lo), PAGENNY  Orthopaedic Spine Surgery  Dept Orthopaedic Surgery, [facility]  [facility] Phone: [phone]    Dictation Disclaimer: Some of this Note has been completed with voice-recognition dictation software. Although errors are generally  corrected real-time, there is the potential for a rare error to be present in the completed chart.

## 2022-03-15 DIAGNOSIS — Z98.1 S/P SPINAL FUSION: Primary | ICD-10-CM

## 2022-03-17 NOTE — PROGRESS NOTES
Spine Surgery Follow Up    REFERRING PHYSICIAN: Referred Self   PRIMARY CARE PHYSICIAN: Carley Kern           Chief Complaint:   Surgical Followup (DOS 9/15/21 S/P Decompression and transforaminal lumbar interbody fusion Lumbar 4 to 5)      History of Present Illness:  Symptom Profile Including: location of symptoms, onset, severity, exacerbating/alleviating factors, previous treatments:        Mare Dominguez is a 62 year old female who presents today for 6 month follow up s/p decompression and TLIF L4-5 on 9/15/2021 with Dr. Romero. Last seen at routine 3 month post op and was doing well, was planning on going on a ski trip.    Today, she is doing great. No back or leg pain. Not limited by her back at all.     Oswestry (ANASTACIA) Questionnaire    OSWESTRY DISABILITY INDEX 3/22/2022   Count 10   Sum 4   Oswestry Score (%) 8   Some recent data might be hidden           Physical Exam:   PHYSICAL EXAM:  Constitutional - Patient is healthy, well-nourished and appears stated age  Respiratory - Patient is breathing normally and in no respiratory distress.  Skin - No suspicious rashes or lesions. Well healed lumbar midline incision.  Musculoskeletal - Non-antalgic gait without use of assistive devices. Motor - 5/5 iliopsoas, 5/5 quadriceps, 5/5 hamstrings, 5/5 anterior tibialis, 5/5 extensor hallucis longus, 5/5 gastrocnemius.    Neurologic - Sensation intact to light touch bilaterally.          Imaging:   I ordered and independently reviewed new radiographs at this clinic visit. The results were discussed with the patient.  Findings include:  Lumbar AP/lat XR 3/22/2022   L4-L5 interbody cage, pedicle screws. Instrumentation is intact without evidence of loosening, fracture or migration. Stable severe L5-S1 disc space collapse.            Assessment and Plan:   Assessment:  62 year old female 6 months s/p decompression and TLIF L4-5 on 9/15/2021 with Dr. Romero.     Plan:  Follow up in 6 months for routine 1 year follow  up with Dr. Romero.  Discussed avoiding repetitive heavy lifting/twisting, maintaining a healthy core. Otherwise ok to do all activities as tolerated.     Respectfully,  Carolyn King PA-C

## 2022-03-21 ASSESSMENT — ENCOUNTER SYMPTOMS
JOINT SWELLING: 0
MUSCLE CRAMPS: 1
BACK PAIN: 1
NECK PAIN: 1
MYALGIAS: 1
ARTHRALGIAS: 0
MUSCLE WEAKNESS: 0
STIFFNESS: 0

## 2022-03-22 ENCOUNTER — ANCILLARY PROCEDURE (OUTPATIENT)
Dept: GENERAL RADIOLOGY | Facility: CLINIC | Age: 62
End: 2022-03-22
Attending: PHYSICIAN ASSISTANT
Payer: COMMERCIAL

## 2022-03-22 ENCOUNTER — OFFICE VISIT (OUTPATIENT)
Dept: ORTHOPEDICS | Facility: CLINIC | Age: 62
End: 2022-03-22
Payer: COMMERCIAL

## 2022-03-22 DIAGNOSIS — M43.10 DEGENERATIVE SPONDYLOLISTHESIS: Primary | ICD-10-CM

## 2022-03-22 DIAGNOSIS — Z98.1 S/P SPINAL FUSION: ICD-10-CM

## 2022-03-22 PROCEDURE — 99213 OFFICE O/P EST LOW 20 MIN: CPT | Performed by: PHYSICIAN ASSISTANT

## 2022-03-22 PROCEDURE — 72100 X-RAY EXAM L-S SPINE 2/3 VWS: CPT | Performed by: RADIOLOGY

## 2022-03-22 NOTE — LETTER
3/22/2022     RE: Mare Dominguez  [address] [facility] [address]    Dear Colleague,    Thank you for referring your patient, Mare Dominguez, to the [facility] [place]. Please see a copy of my visit note below.    Spine Surgery Follow Up    REFERRING PHYSICIAN: Referred Self   PRIMARY CARE PHYSICIAN: Carley Kern           Chief Complaint:   Surgical Followup (DOS 9/15/21 S/P Decompression and transforaminal lumbar interbody fusion Lumbar 4 to 5)      History of Present Illness:  Symptom Profile Including: location of symptoms, onset, severity, exacerbating/alleviating factors, previous treatments:        Mare Dominguez is a 62 year old female who presents today for 6 month follow up s/p decompression and TLIF L4-5 on 9/15/2021 with Dr. Romero. Last seen at routine 3 month post op and was doing well, was planning on going on a ski trip.    Today, she is doing great. No back or leg pain. Not limited by her back at all.     Oswestry (ANASTACIA) Questionnaire    OSWESTRY DISABILITY INDEX 3/22/2022   Count 10   Sum 4   Oswestry Score (%) 8   Some recent data might be hidden           Physical Exam:   PHYSICAL EXAM:  Constitutional - Patient is healthy, well-nourished and appears stated age  Respiratory - Patient is breathing normally and in no respiratory distress.  Skin - No suspicious rashes or lesions. Well healed lumbar midline incision.  Musculoskeletal - Non-antalgic gait without use of assistive devices. Motor - 5/5 iliopsoas, 5/5 quadriceps, 5/5 hamstrings, 5/5 anterior tibialis, 5/5 extensor hallucis longus, 5/5 gastrocnemius.    Neurologic - Sensation intact to light touch bilaterally.          Imaging:   I ordered and independently reviewed new radiographs at this clinic visit. The results were discussed with the patient.  Findings include:  Lumbar AP/lat XR 3/22/2022   L4-L5 interbody cage, pedicle screws. Instrumentation is intact without evidence of  loosening, fracture or migration. Stable severe L5-S1 disc space collapse.            Assessment and Plan:   Assessment:  62 year old female 6 months s/p decompression and TLIF L4-5 on 9/15/2021 with Dr. Romero.     Plan:  Follow up in 6 months for routine 1 year follow up with Dr. Romero.  Discussed avoiding repetitive heavy lifting/twisting, maintaining a healthy core. Otherwise ok to do all activities as tolerated.     Respectfully,  Carolyn King PA-C

## 2022-03-31 ENCOUNTER — E-VISIT (OUTPATIENT)
Dept: FAMILY MEDICINE | Facility: CLINIC | Age: 62
End: 2022-03-31
Payer: COMMERCIAL

## 2022-03-31 DIAGNOSIS — N39.0 ACUTE UTI (URINARY TRACT INFECTION): Primary | ICD-10-CM

## 2022-03-31 PROCEDURE — 99421 OL DIG E/M SVC 5-10 MIN: CPT | Performed by: FAMILY MEDICINE

## 2022-03-31 RX ORDER — NITROFURANTOIN 25; 75 MG/1; MG/1
100 CAPSULE ORAL 2 TIMES DAILY
Qty: 10 CAPSULE | Refills: 0 | Status: SHIPPED | OUTPATIENT
Start: 2022-03-31 | End: 2022-04-05

## 2022-03-31 NOTE — PATIENT INSTRUCTIONS
Dear Mare Dominguez    After reviewing your responses, I've been able to diagnose you with a urinary tract infection, which is a common infection of the bladder with bacteria.  This is not a sexually transmitted infection, though urinating immediately after intercourse can help prevent infections.  Drinking lots of fluids is also helpful to clear your current infection and prevent the next one.      I have sent a prescription for antibiotics to your pharmacy to treat this infection.    It is important that you take all of your prescribed medication even if your symptoms are improving after a few doses.  Taking all of your medicine helps prevent the symptoms from returning.     If your symptoms worsen, you develop pain in your back or stomach, develop fevers, or are not improving in 5 days, please contact your primary care provider for an appointment or visit any of our convenient Walk-in or Urgent Care Centers to be seen, which can be found on our website here.    Thanks again for choosing us as your health care partner,    Carley Kern MD

## 2022-05-23 ENCOUNTER — E-VISIT (OUTPATIENT)
Dept: FAMILY MEDICINE | Facility: CLINIC | Age: 62
End: 2022-05-23
Payer: COMMERCIAL

## 2022-05-23 DIAGNOSIS — N39.0 ACUTE UTI (URINARY TRACT INFECTION): Primary | ICD-10-CM

## 2022-05-23 PROCEDURE — 99421 OL DIG E/M SVC 5-10 MIN: CPT | Performed by: FAMILY MEDICINE

## 2022-05-23 RX ORDER — CEFDINIR 300 MG/1
300 CAPSULE ORAL 2 TIMES DAILY
Qty: 14 CAPSULE | Refills: 0 | Status: SHIPPED | OUTPATIENT
Start: 2022-05-23 | End: 2022-05-30

## 2022-05-23 NOTE — PATIENT INSTRUCTIONS
Dear Mare Dominguez    After reviewing your responses, I've been able to diagnose you with a urinary tract infection, which is a common infection of the bladder with bacteria.  This is not a sexually transmitted infection, though urinating immediately after intercourse can help prevent infections.  Drinking lots of fluids is also helpful to clear your current infection and prevent the next one.      I have sent a prescription for antibiotics to your pharmacy to treat this infection.    It is important that you take all of your prescribed medication even if your symptoms are improving after a few doses.  Taking all of your medicine helps prevent the symptoms from returning.     If your symptoms worsen, you develop pain in your back or stomach, develop fevers, or are not improving in 5 days, please contact your primary care provider for an appointment or visit any of our convenient Walk-in or Urgent Care Centers to be seen, which can be found on our website here.    Thanks again for choosing us as your health care partner,    Carley Kern MD    Urinary Tract Infections in Women  Urinary tract infections (UTIs) are most often caused by bacteria. These bacteria enter the urinary tract. The bacteria may come from inside the body. Or they may travel from the skin outside the rectum or vagina into the urethra. Female anatomy makes it easy for bacteria from the bowel to enter a woman s urinary tract, which is the most common source of UTI. This means women develop UTIs more often than men. Pain in or around the urinary tract is a common UTI symptom. But the only way to know for sure if you have a UTI for the healthcare provider to test your urine. The two tests that may be done are the urinalysis and urine culture.     Types of UTIs    Cystitis. A bladder infection (cystitis) is the most common UTI in women. You may have urgent or frequent need to pee. You may also have pain, burning when you pee, and bloody  urine.    Urethritis. This is an inflamed urethra, which is the tube that carries urine from the bladder to outside the body. You may have lower stomach or back pain. You may also have urgent or frequent need to pee.    Pyelonephritis. This is a kidney infection. If not treated, it can be serious and damage your kidneys. In severe cases, you may need to stay in the hospital. You may have a fever and lower back pain.    Medicines to treat a UTI  Most UTIs are treated with antibiotics. These kill the bacteria. The length of time you need to take them depends on the type of infection. It may be as short as 3 days. If you have repeated UTIs, you may need a low-dose antibiotic for several months. Take antibiotics exactly as directed. Don t stop taking them until all of the medicine is gone. If you stop taking the antibiotic too soon, the infection may not go away. You may also develop a resistance to the antibiotic. This can make it much harder to treat.   Lifestyle changes to treat and prevent UTIs   The lifestyle changes below will help get rid of your UTI. They may also help prevent future UTIs.     Drink plenty of fluids. This includes water, juice, or other caffeine-free drinks. Fluids help flush bacteria out of your body.    Empty your bladder. Always empty your bladder when you feel the urge to pee. And always pee before going to sleep. Urine that stays in your bladder can lead to infection. Try to pee before and after sex as well.    Practice good personal hygiene. Wipe yourself from front to back after using the toilet. This helps keep bacteria from getting into the urethra.    Use condoms during sex. These help prevent UTIs caused by sexually transmitted bacteria. Also don't use spermicides during sex. These can increase the risk for UTIs. Choose other forms of birth control instead. For women who tend to get UTIs after sex, a low-dose of a preventive antibiotic may be used. Be sure to discuss this option with  your healthcare provider.    Follow up with your healthcare provider as directed. He or she may test to make sure the infection has cleared. If needed, more treatment may be started.  [employer] last reviewed this educational content on 7/1/2019 2000-2021 [employer]. All rights reserved. This information is not intended as a substitute for professional medical care. Always follow your healthcare professional's instructions.

## 2022-06-24 ENCOUNTER — OFFICE VISIT (OUTPATIENT)
Dept: FAMILY MEDICINE | Facility: CLINIC | Age: 62
End: 2022-06-24
Payer: COMMERCIAL

## 2022-06-24 VITALS
WEIGHT: 159 LBS | RESPIRATION RATE: 18 BRPM | HEIGHT: 65 IN | OXYGEN SATURATION: 98 % | DIASTOLIC BLOOD PRESSURE: 72 MMHG | SYSTOLIC BLOOD PRESSURE: 128 MMHG | TEMPERATURE: 97.5 F | HEART RATE: 56 BPM | BODY MASS INDEX: 26.49 KG/M2

## 2022-06-24 DIAGNOSIS — R10.13 EPIGASTRIC PAIN: Primary | ICD-10-CM

## 2022-06-24 DIAGNOSIS — L30.9 DERMATITIS: ICD-10-CM

## 2022-06-24 DIAGNOSIS — N39.0 RECURRENT UTI: ICD-10-CM

## 2022-06-24 DIAGNOSIS — Z23 HIGH PRIORITY FOR 2019-NCOV VACCINE: ICD-10-CM

## 2022-06-24 DIAGNOSIS — B00.9 HERPETIC LESION: ICD-10-CM

## 2022-06-24 LAB
ALBUMIN SERPL-MCNC: 3.9 G/DL (ref 3.4–5)
ALP SERPL-CCNC: 51 U/L (ref 40–150)
ALT SERPL W P-5'-P-CCNC: 26 U/L (ref 0–50)
ANION GAP SERPL CALCULATED.3IONS-SCNC: 2 MMOL/L (ref 3–14)
AST SERPL W P-5'-P-CCNC: 21 U/L (ref 0–45)
BILIRUB SERPL-MCNC: 0.8 MG/DL (ref 0.2–1.3)
BUN SERPL-MCNC: 13 MG/DL (ref 7–30)
CALCIUM SERPL-MCNC: 9.3 MG/DL (ref 8.5–10.1)
CHLORIDE BLD-SCNC: 107 MMOL/L (ref 94–109)
CO2 SERPL-SCNC: 29 MMOL/L (ref 20–32)
CREAT SERPL-MCNC: 0.68 MG/DL (ref 0.52–1.04)
GFR SERPL CREATININE-BSD FRML MDRD: >90 ML/MIN/1.73M2
GLUCOSE BLD-MCNC: 98 MG/DL (ref 70–99)
LIPASE SERPL-CCNC: 196 U/L (ref 73–393)
POTASSIUM BLD-SCNC: 4.6 MMOL/L (ref 3.4–5.3)
PROT SERPL-MCNC: 7.6 G/DL (ref 6.8–8.8)
SODIUM SERPL-SCNC: 138 MMOL/L (ref 133–144)

## 2022-06-24 PROCEDURE — 99214 OFFICE O/P EST MOD 30 MIN: CPT | Mod: 25 | Performed by: FAMILY MEDICINE

## 2022-06-24 PROCEDURE — 0054A COVID-19,PF,PFIZER (12+ YRS): CPT | Performed by: FAMILY MEDICINE

## 2022-06-24 PROCEDURE — 80053 COMPREHEN METABOLIC PANEL: CPT | Performed by: FAMILY MEDICINE

## 2022-06-24 PROCEDURE — 91305 COVID-19,PF,PFIZER (12+ YRS): CPT | Performed by: FAMILY MEDICINE

## 2022-06-24 PROCEDURE — 36415 COLL VENOUS BLD VENIPUNCTURE: CPT | Performed by: FAMILY MEDICINE

## 2022-06-24 PROCEDURE — 83690 ASSAY OF LIPASE: CPT | Performed by: FAMILY MEDICINE

## 2022-06-24 RX ORDER — TRIAMCINOLONE ACETONIDE 1 MG/G
CREAM TOPICAL 2 TIMES DAILY
Qty: 45 G | Refills: 1 | Status: SHIPPED | OUTPATIENT
Start: 2022-06-24 | End: 2022-06-27

## 2022-06-24 RX ORDER — VALACYCLOVIR HYDROCHLORIDE 500 MG/1
500 TABLET, FILM COATED ORAL 2 TIMES DAILY
Qty: 6 TABLET | Refills: 0 | Status: SHIPPED | OUTPATIENT
Start: 2022-06-24 | End: 2023-01-11

## 2022-06-24 ASSESSMENT — PAIN SCALES - GENERAL: PAINLEVEL: NO PAIN (0)

## 2022-06-24 NOTE — PATIENT INSTRUCTIONS
Thank you for choosing [facility].      Our Clinic hours are:  Mondays    7:20 am - 7 pm  Tues -  Fri  7:20 am - 5 pm      The clinic lab opens at 7:30 am Mon - Fri and appointments are required.    [facility]  Ph. [phone]  Monday-Thursday 8 am - 7pm  Tues/Wed/Fri 8 am - 5:30 pm

## 2022-06-24 NOTE — NURSING NOTE
After obtaining informed consent, the covid-19 booster immunization is given by GARRISON Swan LPN on 6/24/2022 at 11:22 AM.

## 2022-06-24 NOTE — PROGRESS NOTES
"  Assessment & Plan     Epigastric pain  Differntial diagnosis: GB, PUD/gastritis, pancreatitis  Given the correlation to higher alcohol use (on vacation) and the vomiting, pain I favor pancreatitis vs gastritis.   Will get labs now and then have her repeat them in the midst of an episode if possible.     - Comprehensive metabolic panel (BMP + Alb, Alk Phos, ALT, AST, Total. Bili, TP); Future  - Lipase; Future  - US Abdomen Complete; Future  - Comprehensive metabolic panel (BMP + Alb, Alk Phos, ALT, AST, Total. Bili, TP)  - Lipase    Herpetic lesion  I'm not 100% convinced this is herpetic in nature, but risk of tx is low.   Consider derm if not improving  - valACYclovir (VALTREX) 500 MG tablet; Take 1 tablet (500 mg) by mouth 2 times daily for 3 days    Dermatitis     - triamcinolone (KENALOG) 0.1 % external cream; Apply topically 2 times daily    Recurrent UTI  Post coital UTIs - will prophylax with antibiotic   - cephALEXin (KEFLEX) 250 MG capsule; Take 1 capsule (250 mg) by mouth once as needed (UTI prophylaxis)    High priority for 2019-nCoV vaccine     - COVID-19,PF,PFIZER (12+ Yrs GRAY LABEL)             BMI:   Estimated body mass index is 26.08 kg/m  as calculated from the following:    Height as of this encounter: 1.663 m (5' 5.47\").    Weight as of this encounter: 72.1 kg (159 lb).           No follow-ups on file.    Carley Kern MD  [facility]   Mare is a 62 year old, presenting for the following health issues:  Rash and Abdominal Pain (Nausea and vomiting)      History of Present Illness       Reason for visit:  Intermittent abdominal discomfort, nausea, vomiting.  Skin rash.  Urinary symptoms  Symptom onset:  More than a month (comes and goes, starting in May and has had 2 episodes. The first time ended up with a lot of vomiting)  Symptoms include:  Intermittent abdominal discomfort, nausea, vomiting.  Skin rash.  Urinary symptoms (epigastric discomfort last " for about a week and then it gets better)  Symptom intensity:  Mild (Typically comes on when she is traveling and is eating things that are outside of her normal diet. She thinks alcohol use may play a part in it)  Symptom progression:  Staying the same  Had these symptoms before:  No  What makes it worse:  No  What makes it better:  No    She eats 2-3 servings of fruits and vegetables daily.She consumes 0 sweetened beverage(s) daily.She exercises with enough effort to increase her heart rate 30 to 60 minutes per day.  She exercises with enough effort to increase her heart rate 4 days per week.   She is taking medications regularly.     Two episodes each lasting about a week of abdominal pain, vomiting.  First episode also ran a fever x 24 hours.   Each time has been around vacation where they are eating heavier foods, more alcohol, etc    No significant GERD symptoms. No melena, no hematochezia.  No coffee ground emesis. No postprandial pain.    Stopped PPI a month ago or so.     Rash  Onset/Duration: about a week  Description  Location: left shoulder down her left shoulder blade and down to her left flank. She is vaccinated for shingles but thinks it may be shingles  Character: clusters of little small pustules is how she describes the rash  Itching: moderate  Intensity:  moderate  Progression of Symptoms:  Improving but it started on her shoulder blade and it spread up and down. She had a cluster of the same type of rash on her face a couple months ago that she has scarring from  Accompanying signs and symptoms:   Fever: no  Body aches or joint pain: no  Sore throat symptoms: no  Recent cold symptoms: no  History:           Previous episodes of similar rash: none  New exposures:  None  Recent travel: no  Exposure to similar rash: no  Precipitating or alleviating factors: caladril lotion for itching helps  Therapies tried and outcome: none and calamine lotion        Review of Systems   Constitutional, HEENT,  "cardiovascular, pulmonary, gi and gu systems are negative, except as otherwise noted.      Objective    /72 (BP Location: Right arm, Patient Position: Sitting, Cuff Size: Adult Regular)   Pulse 56   Temp 97.5  F (36.4  C) (Tympanic)   Resp 18   Ht 1.663 m (5' 5.47\")   Wt 72.1 kg (159 lb)   LMP 10/18/2012   SpO2 98%   Breastfeeding No   BMI 26.08 kg/m    Body mass index is 26.08 kg/m .  Physical Exam   GENERAL: healthy, alert and no distress  NECK: no adenopathy, no asymmetry, masses, or scars and thyroid normal to palpation  RESP: lungs clear to auscultation - no rales, rhonchi or wheezes  CV: regular rate and rhythm, normal S1 S2, no S3 or S4, no murmur, click or rub, no peripheral edema and peripheral pulses strong  ABDOMEN: soft, nontender, no hepatosplenomegaly, no masses and bowel sounds normal  MS: no gross musculoskeletal defects noted, no edema  SKIN: papular lesions with evidence of excoriations over left shoulderblade area and left shoulder/neck                    .  ..  "

## 2022-06-26 ENCOUNTER — HOSPITAL ENCOUNTER (OUTPATIENT)
Dept: ULTRASOUND IMAGING | Facility: CLINIC | Age: 62
Discharge: HOME | End: 2022-06-26
Attending: FAMILY MEDICINE | Admitting: FAMILY MEDICINE
Payer: COMMERCIAL

## 2022-06-26 DIAGNOSIS — R10.13 EPIGASTRIC PAIN: ICD-10-CM

## 2022-06-26 PROCEDURE — 76700 US EXAM ABDOM COMPLETE: CPT

## 2022-09-19 DIAGNOSIS — Z98.1 S/P SPINAL FUSION: Primary | ICD-10-CM

## 2022-09-28 ENCOUNTER — OFFICE VISIT (OUTPATIENT)
Dept: ORTHOPEDICS | Facility: CLINIC | Age: 62
End: 2022-09-28
Payer: COMMERCIAL

## 2022-09-28 ENCOUNTER — ANCILLARY PROCEDURE (OUTPATIENT)
Dept: GENERAL RADIOLOGY | Facility: CLINIC | Age: 62
End: 2022-09-28
Attending: ORTHOPAEDIC SURGERY
Payer: COMMERCIAL

## 2022-09-28 VITALS — HEIGHT: 65 IN | BODY MASS INDEX: 27.16 KG/M2 | WEIGHT: 163 LBS

## 2022-09-28 DIAGNOSIS — Z98.1 S/P SPINAL FUSION: ICD-10-CM

## 2022-09-28 DIAGNOSIS — Z98.1 S/P SPINAL FUSION: Primary | ICD-10-CM

## 2022-09-28 PROCEDURE — 72170 X-RAY EXAM OF PELVIS: CPT | Performed by: STUDENT IN AN ORGANIZED HEALTH CARE EDUCATION/TRAINING PROGRAM

## 2022-09-28 PROCEDURE — 72100 X-RAY EXAM L-S SPINE 2/3 VWS: CPT | Performed by: STUDENT IN AN ORGANIZED HEALTH CARE EDUCATION/TRAINING PROGRAM

## 2022-09-28 PROCEDURE — 99213 OFFICE O/P EST LOW 20 MIN: CPT | Performed by: ORTHOPAEDIC SURGERY

## 2022-09-28 NOTE — NURSING NOTE
"Reason For Visit:   Chief Complaint   Patient presents with     RECHECK     DOS 9-15-21 TLIF L4-5 for Degen. Spondy 6 month follow up         Primary MD: Carley Kern  Ref. MD: Est    ?  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  Work status?  Full time.     Date of injury: No  Type of injury: No     Date of surgery: 1999  Type of surgery: Discectomy L4-5.     9/15/21 Decompression and transforaminal lumbar interbody fusion Lumbar 4 to 5    Smoker: No  Request smoking cessation information: No    Ht 1.653 m (5' 5.08\")   Wt 73.9 kg (163 lb)   LMP 10/18/2012   BMI 27.06 kg/m      Pain Assessment  Patient Currently in Pain: Denies    Oswestry (ANASTACIA) Questionnaire    OSWESTRY DISABILITY INDEX 9/28/2022   Count 9   Sum 3   Oswestry Score (%) 6.67   Some recent data might be hidden            Visual Analog Pain Scale  Back Pain Scale 0-10: 0  Right leg pain: 0  Left leg pain: 0  Neck Pain Scale 0-10: 0  Right arm pain: 0  Left arm pain: 0    Promis 10 Assessment    PROMIS 10 9/28/2022   In general, would you say your health is: Very good   In general, would you say your quality of life is: Very good   In general, how would you rate your physical health? Very good   In general, how would you rate your mental health, including your mood and your ability to think? Very good   In general, how would you rate your satisfaction with your social activities and relationships? Very good   In general, please rate how well you carry out your usual social activities and roles Very good   To what extent are you able to carry out your everyday physical activities such as walking, climbing stairs, carrying groceries, or moving a chair? Completely   How often have you been bothered by emotional problems such as feeling anxious, depressed or irritable? Never   How would you rate your fatigue on average? None   How would you rate your pain on average?   0 = No Pain  to  10 = Worst Imaginable Pain 2   In " general, would you say your health is: 4   In general, would you say your quality of life is: 4   In general, how would you rate your physical health? 4   In general, how would you rate your mental health, including your mood and your ability to think? 4   In general, how would you rate your satisfaction with your social activities and relationships? 4   In general, please rate how well you carry out your usual social activities and roles. (This includes activities at home, at work and in your community, and responsibilities as a parent, child, spouse, employee, friend, etc.) 4   To what extent are you able to carry out your everyday physical activities such as walking, climbing stairs, carrying groceries, or moving a chair? 5   In the past 7 days, how often have you been bothered by emotional problems such as feeling anxious, depressed, or irritable? 1   In the past 7 days, how would you rate your fatigue on average? 1   In the past 7 days, how would you rate your pain on average, where 0 means no pain, and 10 means worst imaginable pain? 2   Global Mental Health Score 17   Global Physical Health Score 18   PROMIS TOTAL - SUBSCORES 35   Some recent data might be hidden                Maci Mcgee LPN

## 2022-09-28 NOTE — PROGRESS NOTES
REASON FOR VISIT: RECHECK    REFERRING PHYSICIAN: Referred Self     PRIMARY CARE PHYSICIAN: Carley Kern    HISTORY OF PRESENT ILLNESS: Mare Dominguez is a 62 year old female who presents for 1 year follow-up on decompression and TLIF L4-5 on 9/15/2021 with Dr. Romero.  Patient reports today that she is overall doing well.  She has been on a ski trip without any issues regarding her back.  She continues to be able to participate in work without difficulty.      Of note, patient says that she has started to have new onset left upper trapezius pain with radiation down the left arm into the fourth and fifth digits.  She had a similar episode of much more severe pain several years ago for which she had an injection which resolved the pain.    Oswestry score: 6.67   PHYSICAL EXAM:   Constitutional - Patient is healthy, well-nourished and appears stated age.   Respiratory - Patient is breathing normally and in no respiratory distress.   Skin - No suspicious rashes or lesions.   Psychiatric - Normal mood and affect.   Cardiovascular - Peripheral pulses are normal.   Eyes - Visual acuity is normal to the written word.   ENT - Hearing intact to the spoken word.   GI - No abdominal distention.   Musculoskeletal - Non-antalgic gait without use of assistive devices.           Lumbar Spine:    Appearance - Normal    Palpation - Non-tender to palpation    Motor -        LOWER EXTREMITY Left Right   Hip flexion 5/5 5/5   Knee flexion 5/5 5/5   Knee extension 5/5 5/5   Ankle dorsiflexion 5/5 5/5   Ankle plantarflexion 5/5 5/5   Great toe extension 5/5 5/5   Great toe flexion 5/5 5/5              IMAGIN XR lumbar spine AP/lateral views: L4-5 instrumentation appears stable without evidence of loosening, fracture or migration. Stable appearance of degenerative changes at L5-S1. See full radiologic report in chart.      CLINICAL ASSESSMENT:   1. 1 year s/p decompression and TLIF L4-5 on 9/15/2021 with Dr. Romero;  progressing as expected  2. Left cervical radiculopathy    DISCUSSION/PLAN:   Reviewed today's XR images with patient which demonstrate stable fusion construct.  Stable appearance of adjacent degenerative changes at L5-S1.  Congratulated patient on her progress so far.  Recommend she follow-up in 1 year with repeat lumbar AP/lateral view x-rays.    For left arm radicular symptoms: Recommend use of home cervical traction unit.  If left arm symptoms persist, then recommend updated cervical MRI and follow-up with one of Dr. Romero's colleagues (Snehal Knight/ Colton/ Jean-Claude/ Jon). Could also see one of spine PAs for initial evaluation & treatment.    All questions and concerns were answered to the patient's apparent satisfaction before leaving the clinic.     Thank you for allowing Dr. Romero and I to participate in the care of Mare Dominguez.    Respectfully,  Olivia Strange PA-C    I saw and evaluated the patient and developed the plan.  Paul Romero MD      CC  Copy to patient    [id number] [facility] [id number]

## 2022-09-28 NOTE — PROGRESS NOTES
REASON FOR VISIT: RECHECK    REFERRING PHYSICIAN: Referred Self     PRIMARY CARE PHYSICIAN: Carley Kern    HISTORY OF PRESENT ILLNESS: Mare Dominguez is a 62 year old female who presents for follow-up on ***      Oswestry score:   Oswestry (ANASTACIA) Questionnaire    OSWESTRY DISABILITY INDEX 3/22/2022   Count 10   Sum 4   Oswestry Score (%) 8   Some recent data might be hidden       [id number]:   PROMIS-10                                           Pain scale: ***      PHYSICAL EXAM:    Vitals: LMP 10/18/2012     Constitutional: Patient is healthy, well-nourished and appears stated age.    Respiratory: Patient is breathing normally and in no respiratory distress.    Skin: No suspicious rashes or lesions. Incision ***    Gait: Non-antalgic gait without use of assistive devices. Able to tandem gait without difficulty.     Neurologic - Sensation intact to light touch bilaterally***. Romberg ***. Deep tendon reflexes ***.     Musculoskeletal: Strength: ***    Spine: overall good sagittal balance  o Cervical spine:  - Normal lordosis  - Non-tender to palpation  - Full ROM without pain  - Lhermitte's Test: ***  - Spurling's Test: ***  o Thoracic Spine:  - Appearance - Normal kyphosis  - Palpation - Non-tender to palpation   o Lumbosacral Spine:  - Normal lordosis  - Non-tender to palpation, facets non-tender. Facet loading negative.   - ROM - Full, no pain      Hips: No pain with hip log roll and no tenderness over the greater trochanters. Carlos negative.    SI joint non-tender to palpation. Provacation tests: ***. Piriformis stretch test ***.    IMAGING:   The following imaging was independently reviewed and interpreted in clinic. See full radiologic report in chart.  XR ***      MRI ***      CT ***      CLINICAL ASSESSMENT:   Mare Dominguez is a 62 year old female    DISCUSSION/PLAN:   1. ***    All questions and concerns were answered to the patient's apparent satisfaction before leaving the clinic. We used  models, the patients imaging, and drawn diagrams to explain the pathophysiology, and treatments options including surgical and non-surgical.     Thank you for allowing Dr. Romero and I to participate in the care of Mare Dominguez. The above plan was formulated by Dr. Romero who also saw and examined the patient.     Respectfully,  Bishop ZULMA Arambula PA-C

## 2022-09-28 NOTE — LETTER
2022         RE: Mare Dominguez  [id number] [facility] [id number]        Dear Colleague,    Thank you for referring your patient, Mare Dominguez, to the [facility] [place]. Please see a copy of my visit note below.    REASON FOR VISIT: RECHECK    REFERRING PHYSICIAN: Referred Self     PRIMARY CARE PHYSICIAN: Carley Kern    HISTORY OF PRESENT ILLNESS: Mare Dominguez is a 62 year old female who presents for 1 year follow-up on decompression and TLIF L4-5 on 9/15/2021 with Dr. Romero.  Patient reports today that she is overall doing well.  She has been on a ski trip without any issues regarding her back.  She continues to be able to participate in work without difficulty.      Of note, patient says that she has started to have new onset left upper trapezius pain with radiation down the left arm into the fourth and fifth digits.  She had a similar episode of much more severe pain several years ago for which she had an injection which resolved the pain.    Oswestry score: 6.67   PHYSICAL EXAM:   Constitutional - Patient is healthy, well-nourished and appears stated age.   Respiratory - Patient is breathing normally and in no respiratory distress.   Skin - No suspicious rashes or lesions.   Psychiatric - Normal mood and affect.   Cardiovascular - Peripheral pulses are normal.   Eyes - Visual acuity is normal to the written word.   ENT - Hearing intact to the spoken word.   GI - No abdominal distention.   Musculoskeletal - Non-antalgic gait without use of assistive devices.           Lumbar Spine:    Appearance - Normal    Palpation - Non-tender to palpation    Motor -        LOWER EXTREMITY Left Right   Hip flexion 5/5 5/5   Knee flexion 5/5 5/5   Knee extension 5/5 5/5   Ankle dorsiflexion 5/5 5/5   Ankle plantarflexion 5/5 5/5   Great toe extension 5/5 5/5   Great toe flexion 5/5 5/5              IMAGIN XR lumbar spine AP/lateral views:  L4-5 instrumentation appears stable without evidence of loosening, fracture or migration. Stable appearance of degenerative changes at L5-S1. See full radiologic report in chart.      CLINICAL ASSESSMENT:   1. 1 year s/p decompression and TLIF L4-5 on 9/15/2021 with Dr. Romero; progressing as expected  2. Left cervical radiculopathy    DISCUSSION/PLAN:   Reviewed today's XR images with patient which demonstrate stable fusion construct.  Stable appearance of adjacent degenerative changes at L5-S1.  Congratulated patient on her progress so far.  Recommend she follow-up in 1 year with repeat lumbar AP/lateral view x-rays.    For left arm radicular symptoms: Recommend use of home cervical traction unit.  If left arm symptoms persist, then recommend updated cervical MRI and follow-up with one of Dr. Romero's colleagues (Snehal Knight/ Colton/ Jean-Claude/ Jon). Could also see one of spine PAs for initial evaluation & treatment.    All questions and concerns were answered to the patient's apparent satisfaction before leaving the clinic.     Thank you for allowing Dr. Romero and I to participate in the care of Mare Dominguez.    Respectfully,  Olivia Strange PA-C    I saw and evaluated the patient and developed the plan.  Paul Romero MD      CC  Copy to patient    [id number] [facility] [id number]

## 2022-10-23 ENCOUNTER — HEALTH MAINTENANCE LETTER (OUTPATIENT)
Age: 62
End: 2022-10-23

## 2022-12-10 ENCOUNTER — HEALTH MAINTENANCE LETTER (OUTPATIENT)
Age: 62
End: 2022-12-10

## 2023-01-10 ASSESSMENT — ENCOUNTER SYMPTOMS
MYALGIAS: 0
HEMATURIA: 0
NAUSEA: 0
PALPITATIONS: 0
SORE THROAT: 0
COUGH: 0
ARTHRALGIAS: 1
FREQUENCY: 0
ABDOMINAL PAIN: 0
DIARRHEA: 0
WEAKNESS: 0
FEVER: 0
HEMATOCHEZIA: 0
EYE PAIN: 0
HEARTBURN: 1
DYSURIA: 1
HEADACHES: 0
JOINT SWELLING: 1
DIZZINESS: 0
BREAST MASS: 0
SHORTNESS OF BREATH: 0
PARESTHESIAS: 0
CHILLS: 0
NERVOUS/ANXIOUS: 0
CONSTIPATION: 1

## 2023-01-11 ENCOUNTER — OFFICE VISIT (OUTPATIENT)
Dept: FAMILY MEDICINE | Facility: CLINIC | Age: 63
End: 2023-01-11
Payer: COMMERCIAL

## 2023-01-11 ENCOUNTER — HOSPITAL ENCOUNTER (OUTPATIENT)
Dept: MAMMOGRAPHY | Facility: CLINIC | Age: 63
Discharge: HOME | End: 2023-01-11
Attending: FAMILY MEDICINE | Admitting: FAMILY MEDICINE
Payer: COMMERCIAL

## 2023-01-11 VITALS
OXYGEN SATURATION: 96 % | TEMPERATURE: 97.1 F | SYSTOLIC BLOOD PRESSURE: 112 MMHG | WEIGHT: 163 LBS | BODY MASS INDEX: 27.16 KG/M2 | RESPIRATION RATE: 12 BRPM | HEIGHT: 65 IN | DIASTOLIC BLOOD PRESSURE: 66 MMHG | HEART RATE: 60 BPM

## 2023-01-11 DIAGNOSIS — B00.9 HERPETIC LESION: ICD-10-CM

## 2023-01-11 DIAGNOSIS — Z12.31 VISIT FOR SCREENING MAMMOGRAM: ICD-10-CM

## 2023-01-11 DIAGNOSIS — Z78.0 POST-MENOPAUSAL: ICD-10-CM

## 2023-01-11 DIAGNOSIS — Z00.00 ROUTINE GENERAL MEDICAL EXAMINATION AT A HEALTH CARE FACILITY: Primary | ICD-10-CM

## 2023-01-11 DIAGNOSIS — Z12.11 SCREEN FOR COLON CANCER: ICD-10-CM

## 2023-01-11 PROCEDURE — 77067 SCR MAMMO BI INCL CAD: CPT

## 2023-01-11 PROCEDURE — 99396 PREV VISIT EST AGE 40-64: CPT | Performed by: FAMILY MEDICINE

## 2023-01-11 RX ORDER — VALACYCLOVIR HYDROCHLORIDE 500 MG/1
500 TABLET, FILM COATED ORAL 2 TIMES DAILY
Qty: 6 TABLET | Refills: 3 | Status: SHIPPED | OUTPATIENT
Start: 2023-01-11 | End: 2024-05-13

## 2023-01-11 ASSESSMENT — ENCOUNTER SYMPTOMS
DIZZINESS: 0
CONSTIPATION: 1
MYALGIAS: 0
COUGH: 0
SHORTNESS OF BREATH: 0
BREAST MASS: 0
WEAKNESS: 0
HEADACHES: 0
DIARRHEA: 0
PARESTHESIAS: 0
SORE THROAT: 0
EYE PAIN: 0
ARTHRALGIAS: 1
CHILLS: 0
JOINT SWELLING: 1
FREQUENCY: 0
FEVER: 0
PALPITATIONS: 0
HEMATURIA: 0
NERVOUS/ANXIOUS: 0
NAUSEA: 0
DYSURIA: 1
HEMATOCHEZIA: 0
ABDOMINAL PAIN: 0
HEARTBURN: 1

## 2023-01-11 ASSESSMENT — PAIN SCALES - GENERAL: PAINLEVEL: NO PAIN (0)

## 2023-01-11 NOTE — PROGRESS NOTES
SUBJECTIVE:   CC: Mare is an 62 year old who presents for preventive health visit.   Patient has been advised of split billing requirements and indicates understanding: Yes     Healthy Habits:     Getting at least 3 servings of Calcium per day:  Yes    Bi-annual eye exam:  Yes    Dental care twice a year:  Yes    Sleep apnea or symptoms of sleep apnea:  None    Diet:  Regular (no restrictions)    Frequency of exercise:  6-7 days/week    Duration of exercise:  30-45 minutes    Taking medications regularly:  Yes    Medication side effects:  Not applicable    PHQ-2 Total Score: 0    Additional concerns today:  No          Today's PHQ-2 Score:   PHQ-2 ( 1999 Pf[employer]) 1/10/2023   Q1: Little interest or pleasure in doing things 0   Q2: Feeling down, depressed or hopeless 0   PHQ-2 Score 0   PHQ-2 Total Score (12-17 Years)- Positive if 3 or more points; Administer PHQ-A if positive -   Q1: Little interest or pleasure in doing things Not at all   Q2: Feeling down, depressed or hopeless Not at all   PHQ-2 Score 0           Social History     Tobacco Use     Smoking status: Never     Smokeless tobacco: Never   Substance Use Topics     Alcohol use: Yes     Comment: every day, 1-2     If you drink alcohol do you typically have >3 drinks per day or >7 drinks per week? Yes      AUDIT - Alcohol Use Disorders Identification Test - Reproduced from the World Health Organization Audit 2001 (Second Edition) 1/10/2023   1.  How often do you have a drink containing alcohol? 4 or more times a week   2.  How many drinks containing alcohol do you have on a typical day when you are drinking? 1 or 2   3.  How often do you have five or more drinks on one occasion? Less than monthly   4.  How often during the last year have you found that you were not able to stop drinking once you had started? Never   5.  How often during the last year have you failed to do what was normally expected of you because of drinking? Never   6.  How often during  the last year have you needed a first drink in the morning to get yourself going after a heavy drinking session? Never   7.  How often during the last year have you had a feeling of guilt or remorse after drinking? Never   8.  How often during the last year have you been unable to remember what happened the night before because of your drinking? Never   9.  Have you or someone else been injured because of your drinking? No   10. Has a relative, friend, doctor or other health care worker been concerned about your drinking or suggested you cut down? No   TOTAL SCORE 5       Reviewed orders with patient.  Reviewed health maintenance and updated orders accordingly - Yes  Lab work is in process  Labs reviewed in EPIC    Breast Cancer Screening:    Breast CA Risk Assessment (FHS-7) 10/25/2021   Do you have a family history of breast, colon, or ovarian cancer? No / Unknown       click delete button to remove this line now  Mammogram Screening: Recommended mammography every 1-2 years with patient discussion and risk factor consideration  Pertinent mammograms are reviewed under the imaging tab.    History of abnormal Pap smear: NO - age 30-65 PAP every 5 years with negative HPV co-testing recommended  PAP / HPV Latest Ref Rng & Units 10/25/2021 10/5/2018 7/28/2017   PAP   Negative for Intraepithelial Lesion or Malignancy (NILM) - -   PAP (Historical) - - NIL NIL   HPV16 Negative Negative Negative Negative   HPV18 Negative Negative Negative Negative   HRHPV Negative Negative Negative Positive(A)     Reviewed and updated as needed this visit by clinical staff   Tobacco  Allergies  Meds              Reviewed and updated as needed this visit by Provider                   Review of Systems   Constitutional: Negative for chills and fever.   HENT: Negative for congestion, ear pain, hearing loss and sore throat.    Eyes: Negative for pain and visual disturbance.   Respiratory: Negative for cough and shortness of breath.   "  Cardiovascular: Negative for chest pain, palpitations and peripheral edema.   Gastrointestinal: Positive for constipation and heartburn. Negative for abdominal pain, diarrhea, hematochezia and nausea.   Breasts:  Negative for tenderness, breast mass and discharge.   Genitourinary: Positive for dysuria and urgency. Negative for frequency, genital sores, hematuria, pelvic pain, vaginal bleeding and vaginal discharge.   Musculoskeletal: Positive for arthralgias and joint swelling. Negative for myalgias.   Skin: Negative for rash.   Neurological: Negative for dizziness, weakness, headaches and paresthesias.   Psychiatric/Behavioral: Negative for mood changes. The patient is not nervous/anxious.           OBJECTIVE:   /66   Pulse 60   Temp 97.1  F (36.2  C) (Tympanic)   Resp 12   Ht 1.657 m (5' 5.25\")   Wt 73.9 kg (163 lb)   LMP 10/18/2012   SpO2 96%   BMI 26.92 kg/m    Physical Exam  GENERAL: healthy, alert and no distress  NECK: no adenopathy, no asymmetry, masses, or scars and thyroid normal to palpation  RESP: lungs clear to auscultation - no rales, rhonchi or wheezes  CV: regular rate and rhythm, normal S1 S2, no S3 or S4, no murmur, click or rub, no peripheral edema and peripheral pulses strong  ABDOMEN: soft, nontender, no hepatosplenomegaly, no masses and bowel sounds normal  MS: no gross musculoskeletal defects noted, no edema  SKIN: right lateral bra line 5 mm lentigo        ASSESSMENT/PLAN:   (Z00.00) Routine general medical examination at a health care facility  (primary encounter diagnosis)  Comment:    Plan:      (Z12.11) Screen for colon cancer  Comment:    Plan: Colonoscopy Screening  Referral             (B00.9) Herpetic lesion  Comment:    Plan: valACYclovir (VALTREX) 500 MG tablet             (Z78.0) Post-menopausal  Comment:    Plan: DX Hip/Pelvis/Spine               Patient has been advised of split billing requirements and indicates understanding: " "Yes      COUNSELING:  Reviewed preventive health counseling, as reflected in patient instructions       Regular exercise       Healthy diet/nutrition      BMI:   Estimated body mass index is 26.92 kg/m  as calculated from the following:    Height as of this encounter: 1.657 m (5' 5.25\").    Weight as of this encounter: 73.9 kg (163 lb).         She reports that she has never smoked. She has never used smokeless tobacco.      Carley Kern MD  [facility]  "

## 2023-01-11 NOTE — PATIENT INSTRUCTIONS
"Call to schedule imaging in [phone]  DEXA scan      Sign up for an account on [facility]    Send me a message or call my care team regarding having a prescription sent to them after the account is created.      This can result in significant cost savings for you!        Thank you for choosing [facility].      When you are out of refills or the refills say \"zero\", it is time to schedule your next appointment in clinic!    Our Clinic hours are:      7:20 am - 7 pm  Tues -  Fri  7:20 am - 5 pm    To speak to the care team, call [phone] option #2      The clinic lab opens at 7:30 am Mon - Fri and appointments are required.    [facility]. [phone]  Monday-Thursday 8 am - 7pm  Tues/Wed/Fri 8 am - 5:30 pm       "

## 2023-01-20 ENCOUNTER — TELEPHONE (OUTPATIENT)
Dept: SURGERY | Facility: CLINIC | Age: 63
End: 2023-01-20
Payer: COMMERCIAL

## 2023-01-20 NOTE — TELEPHONE ENCOUNTER
"    Screening Questions  BLUE  KIND OF PREP RED  LOCATION [review exclusion criteria] GREEN  SEDATION TYPE        Y  Are you active on mychart?       Erlin Howe Ordering/Referring Provider?        BCBS What type of coverage do you have?      n Have you had a positive covid test in the last 14 days?     26.92 1. BMI  [BMI 40+ - review exclusion criteria]    Y  2. Are you able to give consent for your medical care? [IF NO,RN REVIEW]          N  3. Are you taking any prescription pain medications on a routine schedule   (ex narcotics: tramadol, oxycodone, roxicodone, oxycontin,  and percocet)?        N  3a. EXTENDED PREP What kind of prescription?     N 4. Do you have any chemical dependencies such as alcohol, street drugs, or methadone?        **If yes 3- 5 , please schedule with MAC sedation.**          IF YES TO ANY 6 - 10 - HOSPITAL SETTING ONLY.     N 6.   Do you need assistance transferring?     N 7.   Have you had a heart or lung transplant?    N 8.   Are you currently on dialysis?   N 9.   Do you use daily home oxygen?   N 10. Do you take nitroglycerin?   10a. N If yes, how often?     11. [FEMALES]  N Are you currently pregnant?    11a. N If yes, how many weeks? [ Greater than 12 weeks, OR NEEDED]    N 12. Do you have Pulmonary Hypertension? *NEED PAC APPT AT [facility]*     N 13. [review exclusion criteria]  Do you have any implantable devices in your body (pacemaker, defib, LVAD)?    N 14. In the past 6 months, have you had any heart related issues including cardiomyopathy or heart attack?     14a. N If yes, did it require cardiac stenting if so when?     N 15. Have you had a stroke or Transient ischemic attack (TIA - aka  mini stroke ) within 6 months?      N 16. Do you have mod to severe Obstructive Sleep Apnea?  [Hospital only]    N 17. Do you have SEVERE AND UNCONTROLLED asthma? *NEED PAC APPT AT [facility]*     N 18. Are you currently taking any blood thinners?     18a. If yes, inform patient to \"follow up w/ " "ordering provider for bridging instructions.\"    N 19. Do you take the medication Phentermine?    19a. If yes, \"Hold for 7 days before procedure.  Please consult your prescribing provider if you have questions about holding this medication.\"     N  20. Do you have chronic kidney disease?      N  21. Do you have a diagnosis of diabetes?     N  22. On a regular basis do you go 3-5 days between bowel movements?     See below 23. Preferred [facility] for Pre Prescription    [ LIST ONLY ONE PHARMACY]        [id number] - [place], [place] - [address] [facility]        - CLOSING REMINDERS -    Informed patient they will need an adult    Cannot take any type of public or medical transportation alone    Conscious Sedation- Needs  for 6 hours after the procedure       MAC/General-Needs  for 24 hours after procedure    Pre-Procedure Covid test to be completed [[facility] PCR Testing Required]    Confirmed Nurse will call to complete assessment       - SCHEDULING DETAILS -  N Hospital Setting Required? If yes, what is the exclusion?: TIMBO Sheikh  Surgeon    5-11-23  Date of Procedure  Lower Endoscopy [Colonoscopy]  Type of Procedure Scheduled  [facility]-If you answer yes to questions #8, #20, #21Which Colonoscopy Prep was Sent?     GEN Sedation Type     N PAC / Pre-op Required                 "

## 2023-02-08 ENCOUNTER — TELEPHONE (OUTPATIENT)
Dept: FAMILY MEDICINE | Facility: CLINIC | Age: 63
End: 2023-02-08
Payer: COMMERCIAL

## 2023-02-08 NOTE — TELEPHONE ENCOUNTER
Patient Quality Outreach    Patient is due for the following:   Colonoscopy    Next Steps:   - Schedule colon screen  - Update Covid19 vaccine    Type of outreach:    Sent [employer] message.      Questions for provider review:    None     Jazz Pulliam, CMA

## 2023-02-16 ENCOUNTER — HOSPITAL ENCOUNTER (OUTPATIENT)
Dept: MAMMOGRAPHY | Facility: CLINIC | Age: 63
Discharge: HOME | End: 2023-02-16
Attending: FAMILY MEDICINE
Payer: COMMERCIAL

## 2023-02-16 DIAGNOSIS — R92.8 ABNORMAL MAMMOGRAM: ICD-10-CM

## 2023-02-16 PROCEDURE — 77061 BREAST TOMOSYNTHESIS UNI: CPT | Mod: RT

## 2023-02-16 NOTE — LETTER
Mare Dominguez  [address] [facility] [address]        February 16, 2023       Dear Mare:    Thank you for your recent visit.     Breast Imaging Result: We are pleased to inform you that the results of your recent breast imaging show no evidence of malignancy (cancer).    Breast Density: Your breast imaging shows that you have dense breast tissue. This means you have slightly more risk of getting breast cancer. It also means your breast imaging will be harder to read. While it's harder to read, it's still important to do breast imaging. In fact, yearly breast imaging is even more important for anyone at higher risk. You may need to do more testing if you have enough risk.    If you are having any problems such as a lump or pain in your breast, please discuss this with your health care team if you haven't already. You may need more testing.    As you know, it's important to find cancer early. Not all cancers are found through breast imaging, but it's the most accurate method for finding cancer early. A complete check should include breast imaging, physical exams, and breast self-exams. The American College of Radiology and the Society of Breast Imaging advises that yearly breast imaging should start at age 40.    A report of your breast imaging results was sent to: Carley Kern    Your breast imaging will become part of your medical file here at [facility] for at least 10 years. You are responsible for telling any new health care team or breast imaging site of the date and place of this exam.     We appreciate the opportunity to participate in your health care.    Sincerely,    Axel Boyd MD  [facility]

## 2023-02-27 ENCOUNTER — ANCILLARY PROCEDURE (OUTPATIENT)
Dept: BONE DENSITY | Facility: CLINIC | Age: 63
End: 2023-02-27
Attending: FAMILY MEDICINE
Payer: COMMERCIAL

## 2023-02-27 DIAGNOSIS — Z78.0 POST-MENOPAUSAL: ICD-10-CM

## 2023-02-27 PROCEDURE — 77080 DXA BONE DENSITY AXIAL: CPT | Mod: TC | Performed by: STUDENT IN AN ORGANIZED HEALTH CARE EDUCATION/TRAINING PROGRAM

## 2023-05-02 RX ORDER — BISACODYL 5 MG/1
TABLET, DELAYED RELEASE ORAL
Qty: 4 TABLET | Refills: 0 | Status: SHIPPED | OUTPATIENT
Start: 2023-05-02 | End: 2024-05-13

## 2023-05-09 ENCOUNTER — ANESTHESIA EVENT (OUTPATIENT)
Dept: GASTROENTEROLOGY | Facility: CLINIC | Age: 63
End: 2023-05-09
Payer: COMMERCIAL

## 2023-05-09 NOTE — ANESTHESIA PREPROCEDURE EVALUATION
Anesthesia Pre-Procedure Evaluation    Patient: Mare Dominguez   MRN: [id number] : 1960        Preoperative Diagnosis: Degenerative spondylolisthesis [M43.10]   Procedure : Procedure(s):  Decompression and transforaminal lumbar interbody fusion Lumbar 4 to 5     Past Medical History:   Diagnosis Date     Basal cell carcinoma      Cervical high risk HPV (human papillomavirus) test positive 2017    Not 16/18     Cervicalgia     , MRI 10/05 c6-c7 sever left foraminal stenosis, s/p epidural injection 10/20/05     Degeneration of lumbar or lumbosacral intervertebral disc     degen disc lumbar      Other and unspecified malignant neoplasm of skin of other and unspecified parts of face     Basal cell cancer right nose     Other and unspecified ovarian cyst     ovarian cyst -Mare thought that her right ovary was removed, but in fact in retrospect it appears that the left has been removed.     S/P lumpectomy of breast 10/22/2001    excision left breast      Past Surgical History:   Procedure Laterality Date     ABDOMEN SURGERY      Ovarian cyst removed     ARTHROSCOPIC RECONSTRUCTION ANTERIOR CRUCIATE LIGAMENT Left      BIOPSY BREAST       COLONOSCOPY  2012    Procedure: COLONOSCOPY;  Colonoscopy;  Surgeon: Norberto Rivera MD;  Location: [facility]     GYN SURGERY      Ovarian cyst removed     LAPAROSCOPIC APPENDECTOMY N/A 2019    Procedure: APPENDECTOMY, LAPAROSCOPIC;  Surgeon: Paul Rodriguez MD;  Location: [facility]     LUMPECTOMY BREAST       OPTICAL TRACKING SYSTEM FUSION POSTERIOR SPINE LUMBAR N/A 09/15/2021    Procedure: Decompression and transforaminal lumbar interbody fusion Lumbar 4 to 5;  Surgeon: Paul Romero MD;  Location:  OR     PUN/ASPIR BREAST CYST      early  [facility]     SURGICAL HISTORY OF 1978    Ovarian cyst laparotomy      SURGICAL HISTORY OF -   1999    Discectomy lumbar     SURGICAL HISTORY OF -   1994    [facility]  excision basal cell carcinoma right nasal root      Allergies   Allergen Reactions     Hydrocodone Nausea and Vomiting     Latex Rash     Topical rash from gloves     Percocet [Oxycodone-Acetaminophen] Nausea and Vomiting     Sulfa Antibiotics Hives      Social History     Tobacco Use     Smoking status: Never     Smokeless tobacco: Never   Vaping Use     Vaping status: Never Used   Substance Use Topics     Alcohol use: Yes     Comment: every day, 1-2      Wt Readings from Last 1 Encounters:   01/11/23 73.9 kg (163 lb)        Anesthesia Evaluation   Pt has had prior anesthetic. Type: General and MAC.    No history of anesthetic complications       ROS/MED HX  ENT/Pulmonary:  - neg pulmonary ROS     Neurologic:  - neg neurologic ROS     Cardiovascular:  - neg cardiovascular ROS     METS/Exercise Tolerance: >4 METS    Hematologic:  - neg hematologic  ROS     Musculoskeletal: Comment: H/o lumbar discectomy  H/o left ACL repair      GI/Hepatic:     (+) GERD, Asymptomatic on medication,     Renal/Genitourinary:  - neg Renal ROS     Endo:  - neg endo ROS     Psychiatric/Substance Use:  - neg psychiatric ROS     Infectious Disease:  - neg infectious disease ROS     Malignancy:   (+) Malignancy, History of Skin.    Other:  - neg other ROS          Physical Exam    Airway  airway exam normal      Mallampati: II   TM distance: > 3 FB   Neck ROM: full   Mouth opening: > 3 cm    Respiratory Devices and Support         Dental  no notable dental history         Cardiovascular   cardiovascular exam normal       Rhythm and rate: regular and normal     Pulmonary           breath sounds clear to auscultation           OUTSIDE LABS:  CBC:   Lab Results   Component Value Date    WBC 4.3 08/20/2021    WBC 14.3 (H) 08/09/2019    HGB 11.9 10/25/2021    HGB 9.9 (L) 09/17/2021    HCT 38.3 08/20/2021    HCT 37.3 08/09/2019     08/20/2021     08/09/2019     BMP:   Lab Results   Component Value Date     06/24/2022      08/20/2021    POTASSIUM 4.6 06/24/2022    POTASSIUM 4.3 08/20/2021    CHLORIDE 107 06/24/2022    CHLORIDE 106 08/20/2021    CO2 29 06/24/2022    CO2 30 08/20/2021    BUN 13 06/24/2022    BUN 11 08/20/2021    CR 0.68 06/24/2022    CR 0.70 08/20/2021    GLC 98 06/24/2022    GLC 94 09/16/2021     COAGS: No results found for: PTT, INR, FIBR  POC: No results found for: BGM, HCG, HCGS  HEPATIC:   Lab Results   Component Value Date    ALBUMIN 3.9 06/24/2022    PROTTOTAL 7.6 06/24/2022    ALT 26 06/24/2022    AST 21 06/24/2022    ALKPHOS 51 06/24/2022    BILITOTAL 0.8 06/24/2022     OTHER:   Lab Results   Component Value Date    LD 9.3 06/24/2022    MAG 2.3 10/21/2020    LIPASE 196 06/24/2022    TSH 2.16 10/21/2020    SED 7 07/27/2005       Anesthesia Plan    ASA Status:  2      Anesthesia Type: General.     - Airway: Native airway   Induction: Intravenous, Propofol.   Maintenance: Balanced.        Consents    Anesthesia Plan(s) and associated risks, benefits, and realistic alternatives discussed. Questions answered and patient/representative(s) expressed understanding.     - Discussed: Risks, Benefits and Alternatives for BOTH SEDATION and the PROCEDURE were discussed     - Discussed with:  Patient      - Extended Intubation/Ventilatory Support Discussed: No.      - Patient is DNR/DNI Status: No    Use of blood products discussed: No .     Postoperative Care       PONV prophylaxis: Background Propofol Infusion     Comments:                PAC Discussion and Assessment    ASA Classification: 2  Case is suitable for: [place]  Anesthetic techniques and relevant risks discussed: GA  Invasive monitoring and risk discussed: No    Possibility and Risk of blood transfusion discussed: Yes            PAC Resident/NP Anesthesia Assessment: Mare Dominguez is a 61 year old female scheduled for Decompression and transforaminal lumbar interbody fusion Lumbar 4 to 5 on 9/15/21 by Dr. Romero in treatment of degenerative  spondylolisthesis.  PAC referral for risk assessment and optimization for anesthesia:    Pre-operative considerations:  1.  Cardiac:  Functional status- METS >4.  Walks 3-4 miles but is not doing any high intensity exercise due to back pain.  Intermediate risk surgery with 0.4% (RCRI #) risk of major adverse cardiac event.   --denies chest pain, chest pressure, SOB, WILLARD, palpitations, edema  --no known cardiac history    2.  Pulm:  Airway feasible.  CLAUDIA risk: Low  --non smoker    3.  GI:  Risk of PONV score = 3.  If > 2, anti-emetic intervention recommended.  --h/o GERD, controlled with Nexium prn.  May take DOS if needed  --constipation, Miralax daily    VTE risk: 0.5%    Patient is optimized and is acceptable candidate for the proposed procedure.  No further diagnostic evaluation is needed.         **For further details of assessment, testing, and physical exam please see H and P completed on same date.          Kyara Mchugh PA-C, [facility]    Reviewed and Signed by PAC Mid-Level Provider/Resident  Mid-Level Provider/Resident: Kyara Mchugh  Date: 8/20/21      Attending Anesthesiologist Anesthesia Assessment:   Last intubation; GETA  nelida 8/19: easy Macgrath 3, 7.0, grade 1 view of cords  Reviewed and Signed by PAC Anesthesiologist  Anesthesiologist: Tanvir  Date: 8/20/21                     SANDIE Garcia CRNA

## 2023-05-11 ENCOUNTER — HOSPITAL ENCOUNTER (OUTPATIENT)
Facility: CLINIC | Age: 63
Discharge: HOME | End: 2023-05-11
Attending: SURGERY | Admitting: SURGERY
Payer: COMMERCIAL

## 2023-05-11 ENCOUNTER — ANESTHESIA (OUTPATIENT)
Dept: GASTROENTEROLOGY | Facility: CLINIC | Age: 63
End: 2023-05-11
Payer: COMMERCIAL

## 2023-05-11 VITALS
HEIGHT: 65 IN | SYSTOLIC BLOOD PRESSURE: 137 MMHG | HEART RATE: 62 BPM | TEMPERATURE: 97 F | OXYGEN SATURATION: 100 % | WEIGHT: 155 LBS | DIASTOLIC BLOOD PRESSURE: 88 MMHG | RESPIRATION RATE: 16 BRPM | BODY MASS INDEX: 25.83 KG/M2

## 2023-05-11 DIAGNOSIS — Z12.11 SPECIAL SCREENING FOR MALIGNANT NEOPLASMS, COLON: Primary | ICD-10-CM

## 2023-05-11 LAB — COLONOSCOPY: NORMAL

## 2023-05-11 PROCEDURE — 250N000009 HC RX 250: Performed by: NURSE ANESTHETIST, CERTIFIED REGISTERED

## 2023-05-11 PROCEDURE — 88305 TISSUE EXAM BY PATHOLOGIST: CPT | Mod: 26 | Performed by: PATHOLOGY

## 2023-05-11 PROCEDURE — 45385 COLONOSCOPY W/LESION REMOVAL: CPT | Mod: PT | Performed by: SURGERY

## 2023-05-11 PROCEDURE — 45385 COLONOSCOPY W/LESION REMOVAL: CPT | Performed by: SURGERY

## 2023-05-11 PROCEDURE — 250N000011 HC RX IP 250 OP 636: Performed by: NURSE ANESTHETIST, CERTIFIED REGISTERED

## 2023-05-11 PROCEDURE — 370N000017 HC ANESTHESIA TECHNICAL FEE, PER MIN: Performed by: SURGERY

## 2023-05-11 PROCEDURE — 88305 TISSUE EXAM BY PATHOLOGIST: CPT | Mod: TC | Performed by: SURGERY

## 2023-05-11 PROCEDURE — 250N000011 HC RX IP 250 OP 636: Performed by: SURGERY

## 2023-05-11 PROCEDURE — 45380 COLONOSCOPY AND BIOPSY: CPT | Performed by: SURGERY

## 2023-05-11 PROCEDURE — 258N000003 HC RX IP 258 OP 636: Performed by: SURGERY

## 2023-05-11 RX ORDER — NALOXONE HYDROCHLORIDE 0.4 MG/ML
0.2 INJECTION, SOLUTION INTRAMUSCULAR; INTRAVENOUS; SUBCUTANEOUS
Status: DISCONTINUED | OUTPATIENT
Start: 2023-05-11 | End: 2023-05-11 | Stop reason: HOSPADM

## 2023-05-11 RX ORDER — PROPOFOL 10 MG/ML
INJECTION, EMULSION INTRAVENOUS PRN
Status: DISCONTINUED | OUTPATIENT
Start: 2023-05-11 | End: 2023-05-11

## 2023-05-11 RX ORDER — GLYCOPYRROLATE 0.2 MG/ML
INJECTION, SOLUTION INTRAMUSCULAR; INTRAVENOUS PRN
Status: DISCONTINUED | OUTPATIENT
Start: 2023-05-11 | End: 2023-05-11

## 2023-05-11 RX ORDER — SODIUM CHLORIDE, SODIUM LACTATE, POTASSIUM CHLORIDE, CALCIUM CHLORIDE 600; 310; 30; 20 MG/100ML; MG/100ML; MG/100ML; MG/100ML
INJECTION, SOLUTION INTRAVENOUS CONTINUOUS
Status: DISCONTINUED | OUTPATIENT
Start: 2023-05-11 | End: 2023-05-11 | Stop reason: HOSPADM

## 2023-05-11 RX ORDER — FLUMAZENIL 0.1 MG/ML
0.2 INJECTION, SOLUTION INTRAVENOUS
Status: DISCONTINUED | OUTPATIENT
Start: 2023-05-11 | End: 2023-05-11 | Stop reason: HOSPADM

## 2023-05-11 RX ORDER — PROCHLORPERAZINE MALEATE 10 MG
10 TABLET ORAL EVERY 6 HOURS PRN
Status: DISCONTINUED | OUTPATIENT
Start: 2023-05-11 | End: 2023-05-11 | Stop reason: HOSPADM

## 2023-05-11 RX ORDER — ONDANSETRON 2 MG/ML
4 INJECTION INTRAMUSCULAR; INTRAVENOUS EVERY 6 HOURS PRN
Status: DISCONTINUED | OUTPATIENT
Start: 2023-05-11 | End: 2023-05-11 | Stop reason: HOSPADM

## 2023-05-11 RX ORDER — NALOXONE HYDROCHLORIDE 0.4 MG/ML
0.4 INJECTION, SOLUTION INTRAMUSCULAR; INTRAVENOUS; SUBCUTANEOUS
Status: DISCONTINUED | OUTPATIENT
Start: 2023-05-11 | End: 2023-05-11 | Stop reason: HOSPADM

## 2023-05-11 RX ORDER — ONDANSETRON 4 MG/1
4 TABLET, ORALLY DISINTEGRATING ORAL EVERY 6 HOURS PRN
Status: DISCONTINUED | OUTPATIENT
Start: 2023-05-11 | End: 2023-05-11 | Stop reason: HOSPADM

## 2023-05-11 RX ORDER — LIDOCAINE 40 MG/G
CREAM TOPICAL
Status: DISCONTINUED | OUTPATIENT
Start: 2023-05-11 | End: 2023-05-11 | Stop reason: HOSPADM

## 2023-05-11 RX ADMIN — PROPOFOL 50 MG: 10 INJECTION, EMULSION INTRAVENOUS at 08:35

## 2023-05-11 RX ADMIN — PROPOFOL 50 MG: 10 INJECTION, EMULSION INTRAVENOUS at 08:39

## 2023-05-11 RX ADMIN — PROPOFOL 50 MG: 10 INJECTION, EMULSION INTRAVENOUS at 08:45

## 2023-05-11 RX ADMIN — PROPOFOL 50 MG: 10 INJECTION, EMULSION INTRAVENOUS at 08:29

## 2023-05-11 RX ADMIN — GLYCOPYRROLATE 0.2 MG: 0.2 INJECTION, SOLUTION INTRAMUSCULAR; INTRAVENOUS at 08:27

## 2023-05-11 RX ADMIN — PROPOFOL 50 MG: 10 INJECTION, EMULSION INTRAVENOUS at 08:37

## 2023-05-11 RX ADMIN — PROPOFOL 50 MG: 10 INJECTION, EMULSION INTRAVENOUS at 08:27

## 2023-05-11 RX ADMIN — ONDANSETRON 4 MG: 2 INJECTION INTRAMUSCULAR; INTRAVENOUS at 08:00

## 2023-05-11 RX ADMIN — SODIUM CHLORIDE, POTASSIUM CHLORIDE, SODIUM LACTATE AND CALCIUM CHLORIDE: 600; 310; 30; 20 INJECTION, SOLUTION INTRAVENOUS at 08:02

## 2023-05-11 RX ADMIN — PROPOFOL 50 MG: 10 INJECTION, EMULSION INTRAVENOUS at 08:50

## 2023-05-11 RX ADMIN — PROPOFOL 50 MG: 10 INJECTION, EMULSION INTRAVENOUS at 08:31

## 2023-05-11 RX ADMIN — PROPOFOL 50 MG: 10 INJECTION, EMULSION INTRAVENOUS at 08:33

## 2023-05-11 ASSESSMENT — ACTIVITIES OF DAILY LIVING (ADL): ADLS_ACUITY_SCORE: 35

## 2023-05-11 NOTE — ANESTHESIA CARE TRANSFER NOTE
Patient: Mare Dominguez    Procedure: Procedure(s):  COLONOSCOPY, FLEXIBLE, WITH LESION REMOVAL USING SNARE  COLONOSCOPY, WITH POLYPECTOMY AND BIOPSY       Diagnosis: Screen for colon cancer [Z12.11]  Diagnosis Additional Information: No value filed.    Anesthesia Type:   General     Note:    Oropharynx: oropharynx clear of all foreign objects and spontaneously breathing  Level of Consciousness: drowsy  Oxygen Supplementation: room air    Independent Airway: airway patency satisfactory and stable  Dentition: dentition unchanged  Vital Signs Stable: post-procedure vital signs reviewed and stable  Report to RN Given: handoff report given  Patient transferred to: Phase II    Handoff Report: Identifed the Patient, Identified the Reponsible Provider, Reviewed the pertinent medical history, Discussed the surgical course, Reviewed Intra-OP anesthesia mangement and issues during anesthesia, Set expectations for post-procedure period and Allowed opportunity for questions and acknowledgement of understanding      Vitals:  Vitals Value Taken Time   BP     Temp     Pulse     Resp     SpO2         Electronically Signed By: SANDIE William CRNA  May 11, 2023  8:59 AM

## 2023-05-11 NOTE — LETTER
"May 18, 2023      Mare Dominguez  [address] [facility] [address]        Dear ,    We are writing to inform you of your test results.    Your pathology report was:  Normal, please follow up by having a repeat colonoscopy in 10 YEARS.    If you do have further questions please don t hesitate to call the below number.      To make an appointment Please call [phone], for  [facility] or  for [facility], to schedule a follow up appointment in 2 weeks.       Resulted Orders   Surgical Pathology Exam   Result Value Ref Range    Case Report       Surgical Pathology Report                         Case: [id number]                                  Authorizing Provider:  Tk Sheikh MD Collected:           05/11/2023 08:51 AM          Ordering Location:     [facility]   Received:            05/11/2023 09:11 AM                                 [place]                                                                      Pathologist:           Dionte Moe MD PhD                                                      Specimen:    Large Intestine, Colon, Colon Polyps 10cm                                                  Final Diagnosis       A. Colon, polyps at 10 cm, polypectomy:  -Hyperplastic polyps  -Negative for dysplasia or malignancy.        Clinical Information       Procedure:  COLONOSCOPY, FLEXIBLE, WITH LESION REMOVAL USING SNARE  COLONOSCOPY, WITH POLYPECTOMY AND BIOPSY  Pre-op Diagnosis: Screen for colon cancer [Z12.11]  Post-op Diagnosis: Z12.11 - Screen for colon cancer [ICD-10-CM]        Gross Description       A(1). Large Intestine, Colon, Colon Polyps 10cm:  The specimen is received in formalin, labeled with the patient's name, medical record number and other identifying information designated \"colon polyps at 10 cm\". It consists of a single, 1.0 x 0.7 x 0.1 cm tan-pink polyp (inked black) which is sectioned into " 4 pieces, is submitted entirely in 1 cassette.   (Joseline Garvin)      Microscopic Description       Microscopic examination was performed.        Performing Labs       The technical component of this testing was completed at [facility]        Case Images         If you have any questions or concerns, please call the clinic at the number listed above.       Sincerely,      Tk Sheikh MD

## 2023-05-11 NOTE — ANESTHESIA POSTPROCEDURE EVALUATION
Patient: Mare Dominguez    Procedure: Procedure(s):  COLONOSCOPY, FLEXIBLE, WITH LESION REMOVAL USING SNARE  COLONOSCOPY, WITH POLYPECTOMY AND BIOPSY       Anesthesia Type:  General    Note:  Disposition: Outpatient   Postop Pain Control: Uneventful            Sign Out: Well controlled pain   PONV: No   Neuro/Psych: Uneventful            Sign Out: Acceptable/Baseline neuro status   Airway/Respiratory: Uneventful            Sign Out: Acceptable/Baseline resp. status   CV/Hemodynamics: Uneventful            Sign Out: Acceptable CV status; No obvious hypovolemia; No obvious fluid overload   Other NRE: NONE   DID A NON-ROUTINE EVENT OCCUR? No           Last vitals:  Vitals Value Taken Time   /88 05/11/23 0915   Temp     Pulse 62 05/11/23 0915   Resp 16 05/11/23 0915   SpO2 99 % 05/11/23 0916   Vitals shown include unvalidated device data.    Electronically Signed By: SANDIE William CRNA  May 11, 2023  9:17 AM

## 2023-05-11 NOTE — H&P
ENDOSCOPY PRE-SEDATION H&P FOR OUTPATIENT PROCEDURES    Mare Dominguez  [id number]  1960    Procedure:   Colonoscopy possible biopsy, possible polypectomy, with MAC sedation.     Pre-procedure diagnosis: screen    Past medical history:   Past Medical History:   Diagnosis Date     Basal cell carcinoma      Cervical high risk HPV (human papillomavirus) test positive 07/28/2017    Not 16/18     Cervicalgia     , MRI 10/05 c6-c7 sever left foraminal stenosis, s/p epidural injection 10/20/05     Degeneration of lumbar or lumbosacral intervertebral disc     degen disc lumbar      Other and unspecified malignant neoplasm of skin of other and unspecified parts of face 1994    Basal cell cancer right nose     Other and unspecified ovarian cyst 1978    ovarian cyst -Mare thought that her right ovary was removed, but in fact in retrospect it appears that the left has been removed.     S/P lumpectomy of breast 10/22/2001    excision left breast       Past surgical history:   Past Surgical History:   Procedure Laterality Date     ABDOMEN SURGERY  1978    Ovarian cyst removed     ARTHROSCOPIC RECONSTRUCTION ANTERIOR CRUCIATE LIGAMENT Left 2013     BIOPSY BREAST       COLONOSCOPY  07/26/2012    Procedure: COLONOSCOPY;  Colonoscopy;  Surgeon: Norberto Rivera MD;  Location: [facility]     GYN SURGERY  1978    Ovarian cyst removed     LAPAROSCOPIC APPENDECTOMY N/A 08/09/2019    Procedure: APPENDECTOMY, LAPAROSCOPIC;  Surgeon: Paul Rodriguez MD;  Location: [facility]     LUMPECTOMY BREAST       OPTICAL TRACKING SYSTEM FUSION POSTERIOR SPINE LUMBAR N/A 09/15/2021    Procedure: Decompression and transforaminal lumbar interbody fusion Lumbar 4 to 5;  Surgeon: Paul Romero MD;  Location:  OR     [facility] BREAST CYST      early 2020 [facility]     SURGICAL HISTORY OF -   01/01/1978    Ovarian cyst laparotomy      SURGICAL HISTORY OF -   01/01/1999    Discectomy lumbar     SURGICAL HISTORY OF -   08/30/1994    [facility]  excision basal cell carcinoma right nasal root       No current facility-administered medications for this encounter.       Allergies   Allergen Reactions     Hydrocodone Nausea and Vomiting     Latex Rash     Topical rash from gloves     Percocet [Oxycodone-Acetaminophen] Nausea and Vomiting     Sulfa Antibiotics Hives       History of Anesthesia/Sedation Problems: no    Physical Exam:    Mental status: alert  Heart: Normal  Lung: Normal  Assessment of patient's airway: Normal  Other as pertinent for procedure: None     ASA Score: See Provation note    Mallampati score:  I - Faucial pillars, soft palate, and uvula are visible    Assessment/Plan:     The patient is an appropriate candidate to receive sedation.    Informed consent was discussed with the patient/family, including the risks, benefits, potential complications and any alternative options associated with sedation.    Patient assessment completed just prior to sedation and while under constant observation by the provider. Condition determined to be adequate for proceeding with sedation.    The specific risks for the procedure were discussed with the patient at the time of informed consent and include but are not limited to perforation which could require surgery, missing significant neoplasm or lesion, hemorrhage and adverse sedative complication.      Tk Sheikh MD

## 2023-05-12 LAB
PATH REPORT.COMMENTS IMP SPEC: NORMAL
PATH REPORT.COMMENTS IMP SPEC: NORMAL
PATH REPORT.FINAL DX SPEC: NORMAL
PATH REPORT.GROSS SPEC: NORMAL
PATH REPORT.MICROSCOPIC SPEC OTHER STN: NORMAL
PATH REPORT.RELEVANT HX SPEC: NORMAL
PHOTO IMAGE: NORMAL

## 2023-07-17 DIAGNOSIS — N39.0 RECURRENT UTI: ICD-10-CM

## 2023-08-02 ENCOUNTER — MYC MEDICAL ADVICE (OUTPATIENT)
Dept: FAMILY MEDICINE | Facility: CLINIC | Age: 63
End: 2023-08-02
Payer: COMMERCIAL

## 2023-09-22 DIAGNOSIS — Z98.1 S/P SPINAL FUSION: Primary | ICD-10-CM

## 2023-09-27 ENCOUNTER — ANCILLARY PROCEDURE (OUTPATIENT)
Dept: GENERAL RADIOLOGY | Facility: CLINIC | Age: 63
End: 2023-09-27
Attending: ORTHOPAEDIC SURGERY
Payer: COMMERCIAL

## 2023-09-27 ENCOUNTER — OFFICE VISIT (OUTPATIENT)
Dept: ORTHOPEDICS | Facility: CLINIC | Age: 63
End: 2023-09-27
Payer: COMMERCIAL

## 2023-09-27 VITALS — BODY MASS INDEX: 25.88 KG/M2 | HEIGHT: 66 IN | WEIGHT: 161 LBS

## 2023-09-27 DIAGNOSIS — Z98.1 S/P SPINAL FUSION: ICD-10-CM

## 2023-09-27 DIAGNOSIS — M43.10 DEGENERATIVE SPONDYLOLISTHESIS: Primary | ICD-10-CM

## 2023-09-27 PROCEDURE — 72100 X-RAY EXAM L-S SPINE 2/3 VWS: CPT | Performed by: STUDENT IN AN ORGANIZED HEALTH CARE EDUCATION/TRAINING PROGRAM

## 2023-09-27 PROCEDURE — 99213 OFFICE O/P EST LOW 20 MIN: CPT | Performed by: ORTHOPAEDIC SURGERY

## 2023-09-27 NOTE — NURSING NOTE
"Reason For Visit:   Chief Complaint   Patient presents with    RECHECK     1 Year f/u- DOS 9-15-21 TLIF L4-5 for Degen. Spondy 6 month follow up         Primary MD: Carley Kern  Ref. MD: EST    ?  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  Work status?  Full time.     Date of injury: No  Type of injury: No     Date of surgery: 1999  Type of surgery: Discectomy L4-5.     9/15/21 Decompression and transforaminal lumbar interbody fusion Lumbar 4 to 5     Smoker: No  Request smoking cessation information: No    Ht 1.67 m (5' 5.75\")   Wt 73 kg (161 lb)   LMP 10/18/2012   BMI 26.19 kg/m      Pain Assessment  Patient Currently in Pain: Denies    Oswestry (ANASTACIA) Questionnaire        9/27/2023     4:06 PM   OSWESTRY DISABILITY INDEX   Count 9   Sum 0   Oswestry Score (%) 0 %        Visual Analog Pain Scale  Back Pain Scale 0-10: 0  Right leg pain: 0  Left leg pain: 0  Neck Pain Scale 0-10: 0  Right arm pain: 0  Left arm pain: 0    Promis 10 Assessment        9/27/2023     4:07 PM   PROMIS 10   In general, would you say your health is: Excellent   In general, would you say your quality of life is: Excellent   In general, how would you rate your physical health? Excellent   In general, how would you rate your mental health, including your mood and your ability to think? Excellent   In general, how would you rate your satisfaction with your social activities and relationships? Excellent   In general, please rate how well you carry out your usual social activities and roles Excellent   To what extent are you able to carry out your everyday physical activities such as walking, climbing stairs, carrying groceries, or moving a chair? Completely   In the past 7 days, how often have you been bothered by emotional problems such as feeling anxious, depressed, or irritable? Never   In the past 7 days, how would you rate your fatigue on average? None   In the past 7 days, how would you rate your pain on " average, where 0 means no pain, and 10 means worst imaginable pain? 0   In general, would you say your health is: 5   In general, would you say your quality of life is: 5   In general, how would you rate your physical health? 5   In general, how would you rate your mental health, including your mood and your ability to think? 5   In general, how would you rate your satisfaction with your social activities and relationships? 5   In general, please rate how well you carry out your usual social activities and roles. (This includes activities at home, at work and in your community, and responsibilities as a parent, child, spouse, employee, friend, etc.) 5   To what extent are you able to carry out your everyday physical activities such as walking, climbing stairs, carrying groceries, or moving a chair? 5   In the past 7 days, how often have you been bothered by emotional problems such as feeling anxious, depressed, or irritable? 1   In the past 7 days, how would you rate your fatigue on average? 1   In the past 7 days, how would you rate your pain on average, where 0 means no pain, and 10 means worst imaginable pain? 0   Global Mental Health Score 20   Global Physical Health Score 20   PROMIS TOTAL - SUBSCORES 40                Maci Mcgee LPN

## 2023-09-27 NOTE — LETTER
9/27/2023         RE: Mare Dominguez  [address] [facility] [address]        Dear Colleague,    Thank you for referring your patient, Mare Dominguez, to the [facility] [place]. Please see a copy of my visit note below.    Mare returns 2 years out from a L4-5 decompression and fusion with a transforaminal lumbar interbody fusion for degenerative spondylolisthesis.  She feels that things are essentially perfect.  Her Oswestry score is a 0.  Visual analog pain scale is a 0.    Objective: Physical exam reveals a well-developed well-nourished nourished female appearing younger than her stated age.  Gait and station appear normal.  Manual muscle testing of the lower extremities intact for hip flexion knee extension foot dorsi and plantarflexion.    Imaging: AP and lateral lumbar spine images are obtained.  This shows the instrumentation in place with what appears to be a solid interbody fusion and no evidence of L3-4 degeneration.  Of note is there is loss of the L5-S1 disc space height with slight loss of lordosis as well at this level.  I have independently reviewed and interpreted these images.  I have also reviewed them with the patient.        Assessment: Degenerative spondylolisthesis status post decompression and fusion doing quite well.      Plan: Activities as tolerated follow-up in 1 year.    Sincerely,        Paul Romero MD

## 2023-09-27 NOTE — PROGRESS NOTES
Mare returns 2 years out from a L4-5 decompression and fusion with a transforaminal lumbar interbody fusion for degenerative spondylolisthesis.  She feels that things are essentially perfect.  Her Oswestry score is a 0.  Visual analog pain scale is a 0.    Objective: Physical exam reveals a well-developed well-nourished nourished female appearing younger than her stated age.  Gait and station appear normal.  Manual muscle testing of the lower extremities intact for hip flexion knee extension foot dorsi and plantarflexion.    Imaging: AP and lateral lumbar spine images are obtained.  This shows the instrumentation in place with what appears to be a solid interbody fusion and no evidence of L3-4 degeneration.  Of note is there is loss of the L5-S1 disc space height with slight loss of lordosis as well at this level.  I have independently reviewed and interpreted these images.  I have also reviewed them with the patient.        Assessment: Degenerative spondylolisthesis status post decompression and fusion doing quite well.      Plan: Activities as tolerated follow-up in 1 year.

## 2024-01-04 ENCOUNTER — PATIENT OUTREACH (OUTPATIENT)
Dept: CARE COORDINATION | Facility: CLINIC | Age: 64
End: 2024-01-04
Payer: COMMERCIAL

## 2024-01-17 ENCOUNTER — PATIENT OUTREACH (OUTPATIENT)
Dept: CARE COORDINATION | Facility: CLINIC | Age: 64
End: 2024-01-17
Payer: COMMERCIAL

## 2024-01-18 ENCOUNTER — PATIENT OUTREACH (OUTPATIENT)
Dept: CARE COORDINATION | Facility: CLINIC | Age: 64
End: 2024-01-18
Payer: COMMERCIAL

## 2024-02-14 ENCOUNTER — PATIENT OUTREACH (OUTPATIENT)
Dept: CARE COORDINATION | Facility: CLINIC | Age: 64
End: 2024-02-14
Payer: COMMERCIAL

## 2024-03-30 ENCOUNTER — HEALTH MAINTENANCE LETTER (OUTPATIENT)
Age: 64
End: 2024-03-30

## 2024-04-10 ENCOUNTER — TELEPHONE (OUTPATIENT)
Dept: ORTHOPEDICS | Facility: CLINIC | Age: 64
End: 2024-04-10
Payer: COMMERCIAL

## 2024-04-10 NOTE — TELEPHONE ENCOUNTER
Left Voicemail (1st Attempt) and Sent Mychart (1st Attempt) for the patient to call back and schedule the following:    Appointment type: Return Spine  Provider: Heather  Return date: after 9/25  Specialty phone number: [phone]  Additional appointment(s) needed:   Additonal Notes: R/S 9/25 appt

## 2024-04-15 ENCOUNTER — TELEPHONE (OUTPATIENT)
Dept: ORTHOPEDICS | Facility: CLINIC | Age: 64
End: 2024-04-15
Payer: COMMERCIAL

## 2024-04-15 NOTE — TELEPHONE ENCOUNTER
Left Voicemail (2nd Attempt) and Sent Mychart (2nd Attempt) for the patient to call back and schedule the following:    Appointment type: Return surgical spine  Provider: Dr. Romero  Return date: 9/25 appt is canceled due to MAX attempts to r/s

## 2024-05-13 ENCOUNTER — HOSPITAL ENCOUNTER (OUTPATIENT)
Dept: MAMMOGRAPHY | Facility: CLINIC | Age: 64
Discharge: HOME | End: 2024-05-13
Attending: FAMILY MEDICINE | Admitting: FAMILY MEDICINE
Payer: COMMERCIAL

## 2024-05-13 ENCOUNTER — OFFICE VISIT (OUTPATIENT)
Dept: FAMILY MEDICINE | Facility: CLINIC | Age: 64
End: 2024-05-13
Payer: COMMERCIAL

## 2024-05-13 VITALS
HEIGHT: 66 IN | OXYGEN SATURATION: 97 % | BODY MASS INDEX: 26.38 KG/M2 | WEIGHT: 164.13 LBS | RESPIRATION RATE: 18 BRPM | TEMPERATURE: 98.6 F | HEART RATE: 74 BPM | DIASTOLIC BLOOD PRESSURE: 70 MMHG | SYSTOLIC BLOOD PRESSURE: 124 MMHG

## 2024-05-13 DIAGNOSIS — N39.0 RECURRENT UTI: ICD-10-CM

## 2024-05-13 DIAGNOSIS — Z00.00 ROUTINE GENERAL MEDICAL EXAMINATION AT A HEALTH CARE FACILITY: Primary | ICD-10-CM

## 2024-05-13 DIAGNOSIS — Z29.11 NEED FOR RSV VACCINATION: ICD-10-CM

## 2024-05-13 DIAGNOSIS — N95.2 ATROPHIC VAGINITIS: ICD-10-CM

## 2024-05-13 DIAGNOSIS — B00.9 HERPETIC LESION: ICD-10-CM

## 2024-05-13 DIAGNOSIS — Z12.31 VISIT FOR SCREENING MAMMOGRAM: ICD-10-CM

## 2024-05-13 PROCEDURE — 99213 OFFICE O/P EST LOW 20 MIN: CPT | Mod: 25 | Performed by: FAMILY MEDICINE

## 2024-05-13 PROCEDURE — 90471 IMMUNIZATION ADMIN: CPT | Performed by: FAMILY MEDICINE

## 2024-05-13 PROCEDURE — 77063 BREAST TOMOSYNTHESIS BI: CPT

## 2024-05-13 PROCEDURE — 99396 PREV VISIT EST AGE 40-64: CPT | Mod: 25 | Performed by: FAMILY MEDICINE

## 2024-05-13 PROCEDURE — 90678 RSV VACC PREF BIVALENT IM: CPT | Performed by: FAMILY MEDICINE

## 2024-05-13 RX ORDER — VALACYCLOVIR HYDROCHLORIDE 500 MG/1
500 TABLET, FILM COATED ORAL 2 TIMES DAILY
Qty: 6 TABLET | Refills: 3 | Status: SHIPPED | OUTPATIENT
Start: 2024-05-13

## 2024-05-13 RX ORDER — ESTRADIOL 0.5 MG/1
TABLET ORAL
Qty: 24 TABLET | Refills: 3 | Status: SHIPPED | OUTPATIENT
Start: 2024-05-13

## 2024-05-13 SDOH — HEALTH STABILITY: PHYSICAL HEALTH: ON AVERAGE, HOW MANY DAYS PER WEEK DO YOU ENGAGE IN MODERATE TO STRENUOUS EXERCISE (LIKE A BRISK WALK)?: 7 DAYS

## 2024-05-13 SDOH — HEALTH STABILITY: PHYSICAL HEALTH: ON AVERAGE, HOW MANY MINUTES DO YOU ENGAGE IN EXERCISE AT THIS LEVEL?: 30 MIN

## 2024-05-13 ASSESSMENT — SOCIAL DETERMINANTS OF HEALTH (SDOH): HOW OFTEN DO YOU GET TOGETHER WITH FRIENDS OR RELATIVES?: MORE THAN THREE TIMES A WEEK

## 2024-05-13 ASSESSMENT — PAIN SCALES - GENERAL: PAINLEVEL: NO PAIN (0)

## 2024-05-13 NOTE — PATIENT INSTRUCTIONS
"Preventive Care Advice   This is general advice we often give to help people stay healthy. Your care team may have specific advice just for you. Please talk to your care team about your own preventive care needs.  Lifestyle  Exercise at least 150 minutes each week (30 minutes a day, 5 days a week).  Do muscle strengthening activities 2 days a week. These help control your weight and prevent disease.  No smoking.  Wear sunscreen to prevent skin cancer.  Have your home tested for radon every 2 to 5 years. Radon is a colorless, odorless gas that can harm your lungs. To learn more, go to www.health.[facility].mn. and search for \"Radon in Homes.\"  Keep guns unloaded and locked up in a safe place like a safe or gun vault, or, use a gun lock and hide the keys. Always lock away bullets separately. To learn more, visit [employer].mn.gov and search for \"safe gun storage.\"  Nutrition  Eat 5 or more servings of fruits and vegetables each day.  Try wheat bread, brown rice and whole grain pasta (instead of white bread, rice, and pasta).  Get enough calcium and vitamin D. Check the label on foods and aim for 100% of the RDA (recommended daily allowance).  Regular exams  Have a dental exam and cleaning every 6 months.  See your health care team every year to talk about:  Any changes in your health.  Any medicines your care team has prescribed.  Preventive care, family planning, and ways to prevent chronic diseases.  Shots (vaccines)   HPV shots (up to age 26), if you've never had them before.  Hepatitis B shots (up to age 59), if you've never had them before.  COVID-19 shot: Get this shot when it's due.  Flu shot: Get a flu shot every year.  Tetanus shot: Get a tetanus shot every 10 years.  Pneumococcal, hepatitis A, and RSV shots: Ask your care team if you need these based on your risk.  Shingles shot (for age 50 and up).  General health tests  Diabetes screening:  Starting at age 35, Get screened for diabetes at least every 3 years.  If " you are younger than age 35, ask your care team if you should be screened for diabetes.  Cholesterol test: At age 39, start having a cholesterol test every 5 years, or more often if advised.  Bone density scan (DEXA): At age 50, ask your care team if you should have this scan for osteoporosis (brittle bones).  Hepatitis C: Get tested at least once in your life.  Abdominal aortic aneurysm screening: Talk to your doctor about having this screening if you:  Have ever smoked; and  Are biologically male; and  Are between the ages of 65 and 75.  STIs (sexually transmitted infections)  Before age 24: Ask your care team if you should be screened for STIs.  After age 24: Get screened for STIs if you're at risk. You are at risk for STIs (including HIV) if:  You are sexually active with more than one person.  You don't use condoms every time.  You or a partner was diagnosed with a sexually transmitted infection.  If you are at risk for HIV, ask about PrEP medicine to prevent HIV.  Get tested for HIV at least once in your life, whether you are at risk for HIV or not.  Cancer screening tests  Cervical cancer screening: If you have a cervix, begin getting regular cervical cancer screening tests at age 21. Most people who have regular screenings with normal results can stop after age 65. Talk about this with your provider.  Breast cancer scan (mammogram): If you've ever had breasts, begin having regular mammograms starting at age 40. This is a scan to check for breast cancer.  Colon cancer screening: It is important to start screening for colon cancer at age 45.  Have a colonoscopy test every 10 years (or more often if you're at risk) Or, ask your provider about stool tests like a FIT test every year or Cologuard test every 3 years.  To learn more about your testing options, visit: www.[employer]/173939.pdf.  For help making a decision, visit: alicia/jo86056.  Prostate cancer screening test: If you have a prostate and are age 55  to 69, ask your provider if you would benefit from a yearly prostate cancer screening test.  Lung cancer screening: If you are a current or former smoker age 50 to 80, ask your care team if ongoing lung cancer screenings are right for you.  For informational purposes only. Not to replace the advice of your health care provider. Copyright   2023 [employer]. All rights reserved. Clinically reviewed by the [facility] Transitions Program. [employer] [id number] - REV 04/24.    Learning About Stress  What is stress?     Stress is your body's response to a hard situation. Your body can have a physical, emotional, or mental response. Stress is a fact of life for most people, and it affects everyone differently. What causes stress for you may not be stressful for someone else.  A lot of things can cause stress. You may feel stress when you go on a job interview, take a test, or run a race. This kind of short-term stress is normal and even useful. It can help you if you need to work hard or react quickly. For example, stress can help you finish an important job on time.  Long-term stress is caused by ongoing stressful situations or events. Examples of long-term stress include long-term health problems, ongoing problems at work, or conflicts in your family. Long-term stress can harm your health.  How does stress affect your health?  When you are stressed, your body responds as though you are in danger. It makes hormones that speed up your heart, make you breathe faster, and give you a burst of energy. This is called the fight-or-flight stress response. If the stress is over quickly, your body goes back to normal and no harm is done.  But if stress happens too often or lasts too long, it can have bad effects. Long-term stress can make you more likely to get sick, and it can make symptoms of some diseases worse. If you tense up when you are stressed, you may develop neck, shoulder, or low back pain. Stress is  linked to high blood pressure and heart disease.  Stress also harms your emotional health. It can make you carbone, tense, or depressed. Your relationships may suffer, and you may not do well at work or school.  What can you do to manage stress?  You can try these things to help manage stress:   Do something active. Exercise or activity can help reduce stress. Walking is a great way to get started. Even everyday activities such as housecleaning or yard work can help.  Try yoga or eber chi. These techniques combine exercise and meditation. You may need some training at first to learn them.  Do something you enjoy. For example, listen to music or go to a movie. Practice your hobby or do volunteer work.  Meditate. This can help you relax, because you are not worrying about what happened before or what may happen in the future.  Do guided imagery. Imagine yourself in any setting that helps you feel calm. You can use online videos, books, or a teacher to guide you.  Do breathing exercises. For example:  From a standing position, bend forward from the waist with your knees slightly bent. Let your arms dangle close to the floor.  Breathe in slowly and deeply as you return to a standing position. Roll up slowly and lift your head last.  Hold your breath for just a few seconds in the standing position.  Breathe out slowly and bend forward from the waist.  Let your feelings out. Talk, laugh, cry, and express anger when you need to. Talking with supportive friends or family, a counselor, or a mckenna leader about your feelings is a healthy way to relieve stress. Avoid discussing your feelings with people who make you feel worse.  Write. It may help to write about things that are bothering you. This helps you find out how much stress you feel and what is causing it. When you know this, you can find better ways to cope.  What can you do to prevent stress?  You might try some of these things to help prevent stress:  Manage your time.  "This helps you find time to do the things you want and need to do.  Get enough sleep. Your body recovers from the stresses of the day while you are sleeping.  Get support. Your family, friends, and community can make a difference in how you experience stress.  Limit your news feed. Avoid or limit time on social media or news that may make you feel stressed.  Do something active. Exercise or activity can help reduce stress. Walking is a great way to get started.  Where can you learn more?  Go to https://www.[employer].net/patiented  Enter N032 in the search box to learn more about \"Learning About Stress.\"  Current as of: October 24, 2023               Content Version: 14.0    0889-3372 true[x] Media.   Care instructions adapted under license by your healthcare professional. If you have questions about a medical condition or this instruction, always ask your healthcare professional. true[x] Media disclaims any warranty or liability for your use of this information.      Learning About Alcohol Use Disorder  What is alcohol use disorder?  Alcohol use disorder means that a person drinks alcohol even though it causes harm to themselves or others. It can range from mild to severe. The more symptoms of this disorder you have, the more severe it may be. People who have it may find it hard to control their use of alcohol.  People who have this disorder may argue with others about how much they're drinking. Their job may be affected because of drinking. They may drink when it's dangerous or illegal, such as when they drive. They also may have a strong need, or craving, to drink. They may feel like they must drink just to get by. Their drinking may increase their risk of getting hurt or being in a car crash.  Over time, drinking too much alcohol may cause health problems. These may include high blood pressure, liver problems, or problems with digestion.  What are the symptoms?  Maybe you've wondered about " your alcohol habits or how to tell if your drinking is becoming a problem.  Here are some of the symptoms of alcohol use disorder. You may have it if you have two or more of the following symptoms:  You drink larger amounts of alcohol than you ever meant to. Or you've been drinking for a longer time than you ever meant to.  You can't cut down or control your use. Or you constantly wish you could cut down.  You spend a lot of time getting or drinking alcohol or recovering from its effects.  You have strong cravings for alcohol.  You can no longer do your main jobs at work, at school, or at home.  You keep drinking alcohol, even though your use hurts your relationships.  You have stopped doing important activities because of your alcohol use.  You drink alcohol in situations where doing so is dangerous.  You keep drinking alcohol even though you know it's causing health problems.  You need more and more alcohol to get the same effect, or you get less effect from the same amount over time. This is called tolerance.  You have uncomfortable symptoms when you stop drinking alcohol or use less. This is called withdrawal.  Alcohol use disorder can range from mild to severe. The more symptoms you have, the more severe the disorder may be.  You might not realize that your drinking is a problem. You might not drink large amounts when you drink. Or you might go for days or weeks between drinking episodes. But even if you don't drink very often, your drinking could still be harmful and put you at risk.  How is alcohol use disorder treated?  Getting help is up to you. But you don't have to do it alone. There are many people and kinds of treatments that can help.  Treatment for alcohol use disorder can include:  Group therapy, one or more types of counseling, and alcohol education.  Medicines that help to:  Reduce withdrawal symptoms and help you safely stop drinking.  Reduce cravings for alcohol.  Support groups. These groups  "include Alcoholics Anonymous and SMART Recovery (Self-Management and Recovery Training).  Some people are able to stop or cut back on drinking with help from a counselor. People who have moderate to severe alcohol use disorder may need medical treatment. They may need to stay in a hospital or treatment center.  You may have a treatment team to help you. This team may include a psychologist or psychiatrist, counselors, doctors, social workers, nurses, and a . A  helps plan and manage your treatment.  Follow-up care is a key part of your treatment and safety. Be sure to make and go to all appointments, and call your doctor if you are having problems. It's also a good idea to know your test results and keep a list of the medicines you take.  Where can you learn more?  Go to https://www.[employer].net/patiented  Enter H758 in the search box to learn more about \"Learning About Alcohol Use Disorder.\"  Current as of: November 15, 2023               Content Version: 14.0    5042-8738 [employer].   Care instructions adapted under license by your healthcare professional. If you have questions about a medical condition or this instruction, always ask your healthcare professional. Healthwise, [employer] disclaims any warranty or liability for your use of this information.      "

## 2024-05-13 NOTE — PROGRESS NOTES
"Preventive Care Visit  [facility]  Carley Kern MD, Family Medicine  May 13, 2024      Assessment & Plan     Routine general medical examination at a health care facility       Need for RSV vaccination     - RSV VACCINE (ABRYSVO)    Herpetic lesion     - valACYclovir (VALTREX) 500 MG tablet; Take 1 tablet (500 mg) by mouth 2 times daily    Recurrent UTI  Uses post-coitally to prevent UTIs  - cephALEXin (KEFLEX) 250 MG capsule; Take 1 capsule (250 mg) by mouth once as needed (UTI prophylaxis)    Atrophic vaginitis  Willing to try this again, had a hard time remembering previously.   - estradiol (ESTRACE) 0.5 MG tablet; Place one tablet vaginally twice a week at bedtime.    Patient has been advised of split billing requirements and indicates understanding: Yes          BMI  Estimated body mass index is 26.69 kg/m  as calculated from the following:    Height as of this encounter: 1.67 m (5' 5.75\").    Weight as of this encounter: 74.4 kg (164 lb 2 oz).       Counseling  Appropriate preventive services were discussed with this patient, including applicable screening as appropriate for fall prevention, nutrition, physical activity, Tobacco-use cessation, weight loss and cognition.  Checklist reviewing preventive services available has been given to the patient.  Reviewed patient's diet, addressing concerns and/or questions.   The patient was instructed to see the dentist every 6 months.   The patient reports drinking more than one alcoholic drink per day and sometimes engages in binge or excessive drinking. The patient was counseled and given information about possible harmful effects of excessive alcohol intake as well as where to get help for alcohol problems.         Wyatt Garvey is a 64 year old, presenting for the following:  Physical        5/13/2024     2:50 PM   Additional Questions   Roomed by Jazz de la rosa CMA   Accompanied by Self        Via the Health Maintenance questionnaire, " the patient has reported the following services have been completed -Mammogram, this information has been sent to the abstraction team.  Health Care Directive  Patient does not have a Health Care Directive or Living Will: Discussed advance care planning with patient; information given to patient to review.    HPI            5/13/2024   General Health   How would you rate your overall physical health? Good   Feel stress (tense, anxious, or unable to sleep) To some extent   (!) STRESS CONCERN      5/13/2024   Nutrition   Three or more servings of calcium each day? Yes   Diet: Regular (no restrictions)   How many servings of fruit and vegetables per day? (!) 2-3   How many sweetened beverages each day? 0-1         5/13/2024   Exercise   Days per week of moderate/strenous exercise 7 days   Average minutes spent exercising at this level 30 min         5/13/2024   Social Factors   Frequency of gathering with friends or relatives More than three times a week   Worry food won't last until get money to buy more No   Food not last or not have enough money for food? No   Do you have housing?  Yes   Are you worried about losing your housing? No   Lack of transportation? No   Unable to get utilities (heat,electricity)? No         5/13/2024   Fall Risk   Fallen 2 or more times in the past year? No   Trouble with walking or balance? No          5/13/2024   Dental   Dentist two times every year? (!) NO         5/13/2024   TB Screening   Were you born outside of the [place]? No         Today's PHQ-2 Score:       5/13/2024     5:13 AM   PHQ-2 ( 1999 Pfizer)   Q1: Little interest or pleasure in doing things 0   Q2: Feeling down, depressed or hopeless 0   PHQ-2 Score 0   Q1: Little interest or pleasure in doing things Not at all   Q2: Feeling down, depressed or hopeless Not at all   PHQ-2 Score 0           5/13/2024   Substance Use   Alcohol more than 3/day or more than 7/wk Yes   How often do you have a drink containing alcohol 4 or more  times a week   How many alcohol drinks on typical day 3 or 4   How often do you have 5+ drinks at one occasion Monthly   Audit 2/3 Score 3   How often not able to stop drinking once started Never   How often failed to do what normally expected Never   How often needed first drink in am after a heavy drinking session Never   How often feeling of guilt or remorse after drinking Never   How often unable to remember what happened the night before Never   Have you or someone else been injured because of your drinking No   Has anyone been concerned or suggested you cut down on drinking No   TOTAL SCORE - AUDIT 7   Do you use any other substances recreationally? No     Social History     Tobacco Use    Smoking status: Never    Smokeless tobacco: Never   Vaping Use    Vaping status: Never Used   Substance Use Topics    Alcohol use: Yes     Comment: every day, 1-2    Drug use: No           9/13/2021   LAST FHS-7 RESULTS   1st degree relative breast or ovarian cancer No   Any relative bilateral breast cancer No   Any male have breast cancer No   Any ONE woman have BOTH breast AND ovarian cancer No   Any woman with breast cancer before 50yrs No   2 or more relatives with breast AND/OR ovarian cancer No   2 or more relatives with breast AND/OR bowel cancer No        Mammogram Screening - Mammogram every 1-2 years updated in Health Maintenance based on mutual decision making        5/13/2024   STI Screening   New sexual partner(s) since last STI/HIV test? No     History of abnormal Pap smear: NO - age 30-65 PAP every 5 years with negative HPV co-testing recommended        Latest Ref Rng & Units 10/25/2021     7:48 AM 10/5/2018    11:45 AM 10/5/2018    11:16 AM   PAP / HPV   PAP  Negative for Intraepithelial Lesion or Malignancy (NILM)      PAP (Historical)    NIL    HPV 16 DNA Negative Negative  Negative     HPV 18 DNA Negative Negative  Negative     Other HR HPV Negative Negative  Negative       ASCVD Risk   The  "10-year ASCVD risk score (Bucky FOSTER, et al., 2019) is: 3.3%    Values used to calculate the score:      Age: 64 years      Sex: Female      Is Non- : No      Diabetic: No      Tobacco smoker: No      Systolic Blood Pressure: 124 mmHg      Is BP treated: No      HDL Cholesterol: 97 mg/dL      Total Cholesterol: 212 mg/dL           Reviewed and updated as needed this visit by Provider                          Review of Systems  Constitutional, HEENT, cardiovascular, pulmonary, gi and gu systems are negative, except as otherwise noted.     Objective    Exam  /70   Pulse 74   Temp 98.6  F (37  C) (Tympanic)   Resp 18   Ht 1.67 m (5' 5.75\")   Wt 74.4 kg (164 lb 2 oz)   LMP 10/18/2012   SpO2 97%   BMI 26.69 kg/m     Estimated body mass index is 26.69 kg/m  as calculated from the following:    Height as of this encounter: 1.67 m (5' 5.75\").    Weight as of this encounter: 74.4 kg (164 lb 2 oz).    Physical Exam  GENERAL: alert and no distress  EYES: Eyes grossly normal to inspection, PERRL and conjunctivae and sclerae normal  NECK: no adenopathy, no asymmetry, masses, or scars  RESP: lungs clear to auscultation - no rales, rhonchi or wheezes  BREAST: normal without masses, tenderness or nipple discharge and no palpable axillary masses or adenopathy  CV: regular rate and rhythm, normal S1 S2, no S3 or S4, no murmur, click or rub, no peripheral edema  ABDOMEN: soft, nontender, no hepatosplenomegaly, no masses and bowel sounds normal  MS: no gross musculoskeletal defects noted, no edema  SKIN: no suspicious lesions or rashes  PSYCH: mentation appears normal, affect normal/bright  LYMPH: no cervical, supraclavicular, axillary adenopathy        Signed Electronically by: Carley Kern MD    "

## 2024-09-26 DIAGNOSIS — M43.10 DEGENERATIVE SPONDYLOLISTHESIS: ICD-10-CM

## 2024-09-26 DIAGNOSIS — Z98.1 S/P SPINAL FUSION: Primary | ICD-10-CM

## 2024-09-26 DIAGNOSIS — Z98.1 HX OF SPINAL FUSION: ICD-10-CM

## 2024-10-10 ENCOUNTER — ANCILLARY PROCEDURE (OUTPATIENT)
Dept: GENERAL RADIOLOGY | Facility: CLINIC | Age: 64
End: 2024-10-10
Attending: ORTHOPAEDIC SURGERY
Payer: COMMERCIAL

## 2024-10-10 ENCOUNTER — OFFICE VISIT (OUTPATIENT)
Dept: ORTHOPEDICS | Facility: CLINIC | Age: 64
End: 2024-10-10
Payer: COMMERCIAL

## 2024-10-10 VITALS — HEIGHT: 65 IN | WEIGHT: 164 LBS | BODY MASS INDEX: 27.32 KG/M2

## 2024-10-10 DIAGNOSIS — Z98.1 S/P SPINAL FUSION: Primary | ICD-10-CM

## 2024-10-10 DIAGNOSIS — M43.10 DEGENERATIVE SPONDYLOLISTHESIS: ICD-10-CM

## 2024-10-10 DIAGNOSIS — Z98.1 HX OF SPINAL FUSION: ICD-10-CM

## 2024-10-10 PROCEDURE — 72082 X-RAY EXAM ENTIRE SPI 2/3 VW: CPT | Performed by: STUDENT IN AN ORGANIZED HEALTH CARE EDUCATION/TRAINING PROGRAM

## 2024-10-10 PROCEDURE — 99214 OFFICE O/P EST MOD 30 MIN: CPT | Performed by: ORTHOPAEDIC SURGERY

## 2024-10-10 PROCEDURE — 77073 BONE LENGTH STUDIES: CPT | Performed by: STUDENT IN AN ORGANIZED HEALTH CARE EDUCATION/TRAINING PROGRAM

## 2024-10-10 NOTE — PROGRESS NOTES
REASON FOR CONSULTATION: RECHECK (DOS 9-15-21 TLIF L4-5 for Ronaldo. Regany)     REFERRING PHYSICIAN: No ref. provider found     PRIMARY CARE PHYSICIAN: Carley Kern    HISTORY OF PRESENT ILLNESS: Mare Dominguez is a 64 year old female who presents for routine postop evaluation. Her last visit was one year ago and was doing very well then with relieved pain and good function after procedure. On today's evaluation patient had no new complaints, No leg pain, numbness or radicular complaints. She also states that her back pain is completely relieved since her surgery and states that she has been able to perform heavy housework with  without exacerbation of symptoms.     Oswestry Disability Score: 0  [id number]: 39  Pain Scale: 0      Past medical, past surgical, social, family history, medications, and allergies reviewed on the orthopaedic surgery intake form which is scanned into the electronic record with pertinent positives commented on.    Allergies   Allergen Reactions    Hydrocodone Nausea and Vomiting    Latex Rash     Topical rash from gloves    Percocet [Oxycodone-Acetaminophen] Nausea and Vomiting    Sulfa Antibiotics Hives       Past Medical History:   Diagnosis Date    Basal cell carcinoma     Cervical high risk HPV (human papillomavirus) test positive 07/28/2017    Not 16/18    Cervicalgia     , MRI 10/05 c6-c7 sever left foraminal stenosis, s/p epidural injection 10/20/05    Degeneration of lumbar or lumbosacral intervertebral disc     degen disc lumbar     Other and unspecified malignant neoplasm of skin of other and unspecified parts of face 1994    Basal cell cancer right nose    Other and unspecified ovarian cyst 1978    ovarian cyst -Mare thought that her right ovary was removed, but in fact in retrospect it appears that the left has been removed.    S/P lumpectomy of breast 10/22/2001    excision left breast       Past Surgical History:   Procedure Laterality Date    ABDOMEN SURGERY  1978     Ovarian cyst removed    ARTHROSCOPIC RECONSTRUCTION ANTERIOR CRUCIATE LIGAMENT Left 2013    BIOPSY BREAST      COLONOSCOPY  07/26/2012    Procedure: COLONOSCOPY;  Colonoscopy;  Surgeon: Norberto Rivera MD;  Location: [facility]    COLONOSCOPY N/A 5/11/2023    Procedure: COLONOSCOPY, FLEXIBLE, WITH LESION REMOVAL USING SNARE;  Surgeon: Tk Sheikh MD;  Location: [facility]    COLONOSCOPY N/A 5/11/2023    Procedure: COLONOSCOPY, WITH POLYPECTOMY AND BIOPSY;  Surgeon: Tk Sheikh MD;  Location: [facility]    GYN SURGERY  1978    Ovarian cyst removed    LAPAROSCOPIC APPENDECTOMY N/A 08/09/2019    Procedure: APPENDECTOMY, LAPAROSCOPIC;  Surgeon: Paul Rodriguez MD;  Location: [facility]    LUMPECTOMY BREAST      OPTICAL TRACKING SYSTEM FUSION POSTERIOR SPINE LUMBAR N/A 09/15/2021    Procedure: Decompression and transforaminal lumbar interbody fusion Lumbar 4 to 5;  Surgeon: Paul Romero MD;  Location:  OR    PUNC/ASPIR BREAST CYST      early 2020 [facility]    SURGICAL HISTORY OF -   01/01/1978    Ovarian cyst laparotomy     SURGICAL HISTORY OF -   01/01/1999    Discectomy lumbar    SURGICAL HISTORY OF -   08/30/1994    Mohs' excision basal cell carcinoma right nasal root       Family History   Problem Relation Age of Onset    Lipids Mother     Arrhythmia Mother     Hypertension Mother     Hyperlipidemia Mother     Neurologic Disorder Father         Parkinson's    Hypertension Maternal Grandmother     Heart Disease Maternal Grandfather     Cancer Paternal Grandfather         G.I.    Thyroid Disease Sister         Cancer    Cancer Sister         thyroid    ALS Sister     ALS Sister     Other Cancer Sister         Thyroid Cancer    Thyroid Disease Sister        Social History     Tobacco Use    Smoking status: Never    Smokeless tobacco: Never   Substance Use Topics    Alcohol use: Yes     Comment: every day, 1-2       Current Outpatient Medications   Medication Sig Dispense Refill    Calcium  Carbonate-Vitamin D (CALCIUM + D) 600-200 MG-UNIT per tablet Take 2 tablets by mouth daily       cephALEXin (KEFLEX) 250 MG capsule Take 1 capsule (250 mg) by mouth once as needed (UTI prophylaxis) 30 capsule 3    estradiol (ESTRACE) 0.5 MG tablet Place one tablet vaginally twice a week at bedtime. 24 tablet 3    Flaxseed, Linseed, (FLAX SEED OIL) 1000 MG capsule Take 1 capsule by mouth 2 times daily       GLUCOSAMINE 500 MG OR CAPS Take 1,500 mg by mouth 2 times daily       magnesium 250 MG tablet Take 1 tablet by mouth every evening       MIRALAX OR POWD STIR 1 CAPFUL (17GM) IN 8 OZ OF LIQUID AND DRINK (Patient taking differently: Take 0.5 capfuls by mouth every morning.) 1 Bottle prn    triamcinolone (KENALOG) 0.1 % external cream Apply topically 2 times daily Apply to shoulder and neck.  Please dispense 45g 45 g 1    valACYclovir (VALTREX) 500 MG tablet Take 1 tablet (500 mg) by mouth 2 times daily 6 tablet 3     No current facility-administered medications for this visit.              Physical Exam     Constitutional - Patient is healthy, well-nourished and appears younger than stated age.    BMI = 27.06    Respiratory - Patient is breathing normally and in no respiratory distress.   Skin - No suspicious rashes or lesions.  Surgical incision is .   Psychiatric - Normal mood and affect.   Cardiovascular - Peripheral pulses are normal.   Eyes - Visual acuity is normal to the written word.   ENT - Hearing intact to the spoken word.   GI - No abdominal distention.   Musculoskeletal - Non-antalgic gait without use of assistive devices.                 Lumbar Spine:    Appearance - Normal .  well healed surgical incision    Palpation - Non-tender to palpation     ROM - Full     Motor -        LOWER EXTREMITY Left Right   Hip flexion 5/5 5/5   Knee flexion 5/5 5/5   Knee extension 5/5 5/5   Ankle dorsiflexion 5/5 5/5   Ankle plantarflexion 5/5 5/5   Great toe extension 5/5 5/5        Neurologic - Sensation intact to  light touch bilaterally.              Imaging:        X-rays done today and reviewed. Implants appear to be in good position and unchanged from a X-rays done 1 year ago at previous visit. No evidence of hardware subsidence, lucency or screw pullout. L5-S1 disc space is narrowed but unchanged from previous imaging.   Solid evidence of interbody fusion with bridging bone formation at instrumented level.          Clinical Assessment:     1. S/p L4-L5 TLIF for Degenerative Spondylolisthesis.             Discussion/ Plan     Mare Dominguez is a 64 year old female patient that comes today for evaluation with above history and physical examination findings. She is doing very well postop with desired relief in back pain and radiculopathy with surgical procedure.     Patient with no restrictions to activity and may perform any activity that she can tolerate as there is evidence of solid fusion along her operated level. Patient may follow up in 5 years with new X-rays if no issues are to arise at an earlier date.       All questions and concerns were answered to the patient's apparent satisfaction before leaving the clinic.     Thank you for allowing us to see this pleasant patient.  Dr. Romero and I are happy to be involved in her care.    Respectfully,  Uri Talamantes MD  Spine Fellow    Mare is now 3 years out from a L4-5 fusion for degenerative spondylolisthesis.  She feels that she has almost no limitations.  She has no back pain even with carrying pavers.  She has no real back or leg pain.    Imaging shows the fusion is very solid and robust at L4-5.  Of note is she does have disc height loss which was present preoperatively at L5-S1.  Overall her lumbar lordosis appears appropriate for her pelvic incidence and her dens is centered over her hips.    At this point I think she only needs follow-up every 5 years or so or if there is a problem that she is concerned about.  Any one of my partners are to be able to do this  follow-up for her.    My total contact time on day of visit including image ordering, independent image interpretation, face-to-face evaluation, documentation was 30 minutes.    Paul Romero MD

## 2024-10-10 NOTE — NURSING NOTE
"Reason For Visit:   Chief Complaint   Patient presents with    RECHECK     DOS 9-15-21 TLIF L4-5 for Degen. Piotr       Primary MD: Carley Kern  Ref. MD: EST    ?  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  Work status?  Full time.     Date of injury: No  Type of injury: No     Date of surgery: 1999  Type of surgery: Discectomy L4-5.     9/15/21 Decompression and transforaminal lumbar interbody fusion Lumbar 4 to 5     Smoker: No  Request smoking cessation information: No    Ht 1.658 m (5' 5.28\")   Wt 74.4 kg (164 lb)   LMP 10/18/2012   BMI 27.06 kg/m      Pain Assessment  Patient Currently in Pain: Denies    Oswestry (ANASTACIA) Questionnaire        10/9/2024     6:01 PM   OSWESTRY DISABILITY INDEX   Count 10   Sum 0   Oswestry Score (%) 0 %        Visual Analog Pain Scale  Back Pain Scale 0-10: 0  Right leg pain: 0  Left leg pain: 0  Neck Pain Scale 0-10: 0  Right arm pain: 0  Left arm pain: 0    Promis 10 Assessment        10/9/2024     6:02 PM   PROMIS 10   In general, would you say your health is: Excellent   In general, would you say your quality of life is: Excellent   In general, how would you rate your physical health? Excellent   In general, how would you rate your mental health, including your mood and your ability to think? Excellent   In general, how would you rate your satisfaction with your social activities and relationships? Excellent   In general, please rate how well you carry out your usual social activities and roles Excellent   To what extent are you able to carry out your everyday physical activities such as walking, climbing stairs, carrying groceries, or moving a chair? Completely   In the past 7 days, how often have you been bothered by emotional problems such as feeling anxious, depressed, or irritable? Never   In the past 7 days, how would you rate your fatigue on average? Mild   In the past 7 days, how would you rate your pain on average, where 0 means no pain, " and 10 means worst imaginable pain? 0   In general, would you say your health is: 5   In general, would you say your quality of life is: 5   In general, how would you rate your physical health? 5   In general, how would you rate your mental health, including your mood and your ability to think? 5   In general, how would you rate your satisfaction with your social activities and relationships? 5   In general, please rate how well you carry out your usual social activities and roles. (This includes activities at home, at work and in your community, and responsibilities as a parent, child, spouse, employee, friend, etc.) 5   To what extent are you able to carry out your everyday physical activities such as walking, climbing stairs, carrying groceries, or moving a chair? 5   In the past 7 days, how often have you been bothered by emotional problems such as feeling anxious, depressed, or irritable? 1   In the past 7 days, how would you rate your fatigue on average? 2   In the past 7 days, how would you rate your pain on average, where 0 means no pain, and 10 means worst imaginable pain? 0   Global Mental Health Score 20   Global Physical Health Score 19   PROMIS TOTAL - SUBSCORES 39                Maci Mcgee LPN

## 2024-10-10 NOTE — LETTER
10/10/2024      Mare Dominguez  [address] [facility] [address]      Dear Colleague,    Thank you for referring your patient, Mare Dominguez, to the [facility] [place]. Please see a copy of my visit note below.    REASON FOR CONSULTATION: RECHECK (DOS 9-15-21 TLIF L4-5 for Degen. Spondy)     REFERRING PHYSICIAN: No ref. provider found     PRIMARY CARE PHYSICIAN: Carley Kern    HISTORY OF PRESENT ILLNESS: Mare Dominguez is a 64 year old female who presents for routine postop evaluation. Her last visit was one year ago and was doing very well then with relieved pain and good function after procedure. On today's evaluation patient had no new complaints, No leg pain, numbness or radicular complaints. She also states that her back pain is completely relieved since her surgery and states that she has been able to perform heavy housework with  without exacerbation of symptoms.     Oswestry Disability Score: 0  [id number]: 39  Pain Scale: 0      Past medical, past surgical, social, family history, medications, and allergies reviewed on the orthopaedic surgery intake form which is scanned into the electronic record with pertinent positives commented on.    Allergies   Allergen Reactions     Hydrocodone Nausea and Vomiting     Latex Rash     Topical rash from gloves     Percocet [Oxycodone-Acetaminophen] Nausea and Vomiting     Sulfa Antibiotics Hives       Past Medical History:   Diagnosis Date     Basal cell carcinoma      Cervical high risk HPV (human papillomavirus) test positive 07/28/2017    Not 16/18     Cervicalgia     , MRI 10/05 c6-c7 sever left foraminal stenosis, s/p epidural injection 10/20/05     Degeneration of lumbar or lumbosacral intervertebral disc     degen disc lumbar      Other and unspecified malignant neoplasm of skin of other and unspecified parts of face 1994    Basal cell cancer right nose     Other and unspecified ovarian cyst 1978     ovarian cyst -Mare thought that her right ovary was removed, but in fact in retrospect it appears that the left has been removed.     S/P lumpectomy of breast 10/22/2001    excision left breast       Past Surgical History:   Procedure Laterality Date     ABDOMEN SURGERY  1978    Ovarian cyst removed     ARTHROSCOPIC RECONSTRUCTION ANTERIOR CRUCIATE LIGAMENT Left 2013     BIOPSY BREAST       COLONOSCOPY  07/26/2012    Procedure: COLONOSCOPY;  Colonoscopy;  Surgeon: Norberto Rivera MD;  Location: [facility]     COLONOSCOPY N/A 5/11/2023    Procedure: COLONOSCOPY, FLEXIBLE, WITH LESION REMOVAL USING SNARE;  Surgeon: Tk Sheikh MD;  Location: [facility]     COLONOSCOPY N/A 5/11/2023    Procedure: COLONOSCOPY, WITH POLYPECTOMY AND BIOPSY;  Surgeon: Tk Sheikh MD;  Location: [facility]     GYN SURGERY  1978    Ovarian cyst removed     LAPAROSCOPIC APPENDECTOMY N/A 08/09/2019    Procedure: APPENDECTOMY, LAPAROSCOPIC;  Surgeon: Paul Rodriguez MD;  Location: [facility]     LUMPECTOMY BREAST       OPTICAL TRACKING SYSTEM FUSION POSTERIOR SPINE LUMBAR N/A 09/15/2021    Procedure: Decompression and transforaminal lumbar interbody fusion Lumbar 4 to 5;  Surgeon: Paul Romero MD;  Location:  OR     PUNC/ASPIR BREAST CYST      early 2020 [facility]     SURGICAL HISTORY OF -   01/01/1978    Ovarian cyst laparotomy      SURGICAL HISTORY OF -   01/01/1999    Discectomy lumbar     SURGICAL HISTORY OF -   08/30/1994    Mohs' excision basal cell carcinoma right nasal root       Family History   Problem Relation Age of Onset     Lipids Mother      Arrhythmia Mother      Hypertension Mother      Hyperlipidemia Mother      Neurologic Disorder Father         Parkinson's     Hypertension Maternal Grandmother      Heart Disease Maternal Grandfather      Cancer Paternal Grandfather         G.I.     Thyroid Disease Sister         Cancer     Cancer Sister         thyroid     ALS Sister      ALS Sister      Other Cancer  Sister         Thyroid Cancer     Thyroid Disease Sister        Social History     Tobacco Use     Smoking status: Never     Smokeless tobacco: Never   Substance Use Topics     Alcohol use: Yes     Comment: every day, 1-2       Current Outpatient Medications   Medication Sig Dispense Refill     Calcium Carbonate-Vitamin D (CALCIUM + D) 600-200 MG-UNIT per tablet Take 2 tablets by mouth daily        cephALEXin (KEFLEX) 250 MG capsule Take 1 capsule (250 mg) by mouth once as needed (UTI prophylaxis) 30 capsule 3     estradiol (ESTRACE) 0.5 MG tablet Place one tablet vaginally twice a week at bedtime. 24 tablet 3     Flaxseed, Linseed, (FLAX SEED OIL) 1000 MG capsule Take 1 capsule by mouth 2 times daily        GLUCOSAMINE 500 MG OR CAPS Take 1,500 mg by mouth 2 times daily        magnesium 250 MG tablet Take 1 tablet by mouth every evening        MIRALAX OR POWD STIR 1 CAPFUL (17GM) IN 8 OZ OF LIQUID AND DRINK (Patient taking differently: Take 0.5 capfuls by mouth every morning.) 1 Bottle prn     triamcinolone (KENALOG) 0.1 % external cream Apply topically 2 times daily Apply to shoulder and neck.  Please dispense 45g 45 g 1     valACYclovir (VALTREX) 500 MG tablet Take 1 tablet (500 mg) by mouth 2 times daily 6 tablet 3     No current facility-administered medications for this visit.              Physical Exam     Constitutional - Patient is healthy, well-nourished and appears younger than stated age.    BMI = 27.06    Respiratory - Patient is breathing normally and in no respiratory distress.   Skin - No suspicious rashes or lesions.  Surgical incision is .   Psychiatric - Normal mood and affect.   Cardiovascular - Peripheral pulses are normal.   Eyes - Visual acuity is normal to the written word.   ENT - Hearing intact to the spoken word.   GI - No abdominal distention.   Musculoskeletal - Non-antalgic gait without use of assistive devices.                 Lumbar Spine:    Appearance - Normal .  well healed  surgical incision    Palpation - Non-tender to palpation     ROM - Full     Motor -        LOWER EXTREMITY Left Right   Hip flexion 5/5 5/5   Knee flexion 5/5 5/5   Knee extension 5/5 5/5   Ankle dorsiflexion 5/5 5/5   Ankle plantarflexion 5/5 5/5   Great toe extension 5/5 5/5        Neurologic - Sensation intact to light touch bilaterally.              Imaging:        X-rays done today and reviewed. Implants appear to be in good position and unchanged from a X-rays done 1 year ago at previous visit. No evidence of hardware subsidence, lucency or screw pullout. L5-S1 disc space is narrowed but unchanged from previous imaging.   Solid evidence of interbody fusion with bridging bone formation at instrumented level.          Clinical Assessment:     1. S/p L4-L5 TLIF for Degenerative Spondylolisthesis.             Discussion/ Plan     Mare Dominguez is a 64 year old female patient that comes today for evaluation with above history and physical examination findings. She is doing very well postop with desired relief in back pain and radiculopathy with surgical procedure.     Patient with no restrictions to activity and may perform any activity that she can tolerate as there is evidence of solid fusion along her operated level. Patient may follow up in 5 years with new X-rays if no issues are to arise at an earlier date.       All questions and concerns were answered to the patient's apparent satisfaction before leaving the clinic.     Thank you for allowing us to see this pleasant patient.  Dr. Romero and I are happy to be involved in her care.    Respectfully,  Uri Talamantes MD  Spine Fellow    Mare is now 3 years out from a L4-5 fusion for degenerative spondylolisthesis.  She feels that she has almost no limitations.  She has no back pain even with carrying pavers.  She has no real back or leg pain.    Imaging shows the fusion is very solid and robust at L4-5.  Of note is she does have disc height loss which was  present preoperatively at L5-S1.  Overall her lumbar lordosis appears appropriate for her pelvic incidence and her dens is centered over her hips.    At this point I think she only needs follow-up every 5 years or so or if there is a problem that she is concerned about.  Any one of my partners are to be able to do this follow-up for her.    My total contact time on day of visit including image ordering, independent image interpretation, face-to-face evaluation, documentation was 30 minutes.    Paul Romero MD        Again, thank you for allowing me to participate in the care of your patient.        Sincerely,        Paul Romero MD

## 2025-01-22 ENCOUNTER — OFFICE VISIT (OUTPATIENT)
Dept: OBGYN | Facility: CLINIC | Age: 65
End: 2025-01-22
Payer: COMMERCIAL

## 2025-01-22 VITALS
HEART RATE: 59 BPM | HEIGHT: 66 IN | TEMPERATURE: 97.1 F | RESPIRATION RATE: 18 BRPM | BODY MASS INDEX: 26.68 KG/M2 | DIASTOLIC BLOOD PRESSURE: 91 MMHG | WEIGHT: 166 LBS | SYSTOLIC BLOOD PRESSURE: 146 MMHG

## 2025-01-22 DIAGNOSIS — Z12.4 CERVICAL CANCER SCREENING: ICD-10-CM

## 2025-01-22 DIAGNOSIS — N94.12 DEEP DYSPAREUNIA: Primary | ICD-10-CM

## 2025-01-22 PROCEDURE — 99203 OFFICE O/P NEW LOW 30 MIN: CPT | Performed by: STUDENT IN AN ORGANIZED HEALTH CARE EDUCATION/TRAINING PROGRAM

## 2025-01-22 PROCEDURE — 87624 HPV HI-RISK TYP POOLED RSLT: CPT | Performed by: STUDENT IN AN ORGANIZED HEALTH CARE EDUCATION/TRAINING PROGRAM

## 2025-01-22 NOTE — NURSING NOTE
"Initial BP (!) 146/91 (BP Location: Right arm, Patient Position: Chair, Cuff Size: Adult Regular)   Pulse 59   Temp 97.1  F (36.2  C) (Tympanic)   Resp 18   Ht 1.67 m (5' 5.75\")   Wt 75.3 kg (166 lb)   LMP 10/18/2012   BMI 27.00 kg/m   Estimated body mass index is 27 kg/m  as calculated from the following:    Height as of this encounter: 1.67 m (5' 5.75\").    Weight as of this encounter: 75.3 kg (166 lb). .    "

## 2025-01-22 NOTE — PROGRESS NOTES
[facility]  Gynecology Office Note    Assessment and Plan:   Mare Dominguez, 64 year old , presents for new onset of pain with intercourse. Pelvic exam was normal. Denied concerns about STI. Pap smear collected. Pelvic US ordered to see whether there is any structural abnormalities that could explain patient's pain.  Orders Placed This Encounter   Procedures    US Pelvic Complete with Transvaginal    HPV and Gynecologic Cytology Panel - Recommended Age 30-65 Years    Gynecologic Cytology (PAP)     Iris Simmons MD  Obstetrics and Gynecology  [facility]   2025     -----------------------------------------------------------------------------------------------------------------------------------  HPI: Mare Dominguez, 64 year old , presents for new onset of pain with intercourse.     She has had the same partners for 45 years. The most recent sexual intercourse over the weekend (-) was painful. Feels like the penis is hurting something deep inside. Some cramping in the uterus area that's not concerning.    Menopause ~52yo. Bowel movement are softer; defecates daily; more gasy. Increased urinary frequency, especially more frequent peeing at night. Denies dysuria, change in urinary urgency, or hematuria. Stress urinary incontinence with full bladder with coughing and laughing.      ROS: A 10 pt ROS was completed and found to be otherwise negative unless mentioned in the HPI.     OBHx: .  x4  GYN Hx:   Patient's last menstrual period was 10/18/2012.  Last Pap Smear: 10/25/2021 NILM w/ neg HPV  17 NIL Pap, + HR HPV (Not 16/18). Plan cotest in 1 year.   10/5/18 Pap: NIL/neg HPV. Plan Pap+HPV in 3 years.  NIL pap, Neg HPV. Plan cotest in 3 years.      Past Medical History:   Diagnosis Date    Basal cell carcinoma     Cervical high risk HPV (human papillomavirus) test positive 2017    Not 16/18    Cervicalgia     , MRI 10/05  c6-c7 sever left foraminal stenosis, s/p epidural injection 10/20/05    Degeneration of lumbar or lumbosacral intervertebral disc     degen disc lumbar     Other and unspecified malignant neoplasm of skin of other and unspecified parts of face 1994    Basal cell cancer right nose    Other and unspecified ovarian cyst 1978    ovarian cyst -Mare thought that her right ovary was removed, but in fact in retrospect it appears that the left has been removed.    S/P lumpectomy of breast 10/22/2001    excision left breast     Patient Active Problem List    Diagnosis Date Noted    Degenerative spondylolisthesis 07/30/2021     Priority: Medium     Added automatically from request for surgery [id number]      Cervical high risk HPV (human papillomavirus) test positive 08/03/2017     Priority: Medium     7/28/17 NIL Pap, + HR HPV (Not 16/18). Plan cotest in 1 year.   10/5/18 Pap: NIL/neg HPV. Plan Pap+HPV in 3 years.  NIL pap, Neg HPV. Plan cotest in 3 years.       History of basal cell cancer 09/24/2012     Priority: Medium    SK (seborrheic keratosis) 09/24/2012     Priority: Medium    Lentigo 09/24/2012     Priority: Medium    Angioma 09/24/2012     Priority: Medium    Melanocytic nevus 09/24/2012     Priority: Medium     (Problem list name updated by automated process. Provider to review and confirm.)      CARDIOVASCULAR SCREENING; LDL GOAL LESS THAN 160 10/31/2010     Priority: Medium    asymptomatic mucous in bowel movements on occasion  11/05/2008     Priority: Medium     November 5, 2008: discussed with Dr. Ortiz and his recommend that no work up at this time. If Mare notes abdominal complaints, changes to bowel movements, or increased frequency of episodes then do colonoscopy to rule out inflammatory bowel disease. Mare will call me if this is the case.      hx of BCC on her nose 01/29/2008     Priority: Medium     November 5, 2008: normal skin exam. recommend yearly derm skin checks due to early history of skin cancer.       URTICARIA of uncertain etiology 02/17/2007     Priority: Medium     February 17, 2007: recommend that Mare follow up with allergist.      Constipation 02/17/2007     Priority: Medium     February 17, 2007: increase water, fiber, metamucil daily. If symptoms do not improve, will do a colonoscopy.  Problem list name updated by automated process. Provider to review      Esophageal reflux 07/27/2005     Priority: Medium     EGD 12/05: normal   February 17, 2007: stable on Nexium.      Rectocele 07/27/2005     Priority: Medium    S/P lumpectomy of breast 10/22/2001     Priority: Medium     excision left breast       Past Surgical History:   Procedure Laterality Date    ABDOMEN SURGERY  1978    Ovarian cyst removed    ARTHROSCOPIC RECONSTRUCTION ANTERIOR CRUCIATE LIGAMENT Left 2013    BIOPSY BREAST      Left breast lumpectomy    COLONOSCOPY  07/26/2012    Procedure: COLONOSCOPY;  Colonoscopy;  Surgeon: Norberto Rivera MD;  Location: [facility]    COLONOSCOPY N/A 05/11/2023    Procedure: COLONOSCOPY, FLEXIBLE, WITH LESION REMOVAL USING SNARE;  Surgeon: Tk Sheikh MD;  Location: [facility]    COLONOSCOPY N/A 05/11/2023    Procedure: COLONOSCOPY, WITH POLYPECTOMY AND BIOPSY;  Surgeon: Tk Sheikh MD;  Location: [facility]    GYN SURGERY  1978    Ovarian cyst removed    LAPAROSCOPIC APPENDECTOMY N/A 08/09/2019    Procedure: APPENDECTOMY, LAPAROSCOPIC;  Surgeon: Paul Rodriguez MD;  Location: [facility]    LUMPECTOMY BREAST      OPTICAL TRACKING SYSTEM FUSION POSTERIOR SPINE LUMBAR N/A 09/15/2021    Procedure: Decompression and transforaminal lumbar interbody fusion Lumbar 4 to 5;  Surgeon: Paul Romero MD;  Location:  OR    [facility]/ASPIR BREAST CYST      early 2020 [facility]    SURGICAL HISTORY OF -   01/01/1978    Ovarian cyst laparotomy     SURGICAL HISTORY OF -   01/01/1999    Discectomy lumbar    SURGICAL HISTORY OF -   08/30/1994    Mohs' excision basal cell carcinoma right nasal root      Medications:   Current Outpatient Medications   Medication Sig Dispense Refill    Calcium Carbonate-Vitamin D (CALCIUM + D) 600-200 MG-UNIT per tablet Take 2 tablets by mouth daily       estradiol (ESTRACE) 0.5 MG tablet Place one tablet vaginally twice a week at bedtime. 24 tablet 3    Flaxseed, Linseed, (FLAX SEED OIL) 1000 MG capsule Take 1 capsule by mouth 2 times daily       GLUCOSAMINE 500 MG OR CAPS Take 1,500 mg by mouth 2 times daily       magnesium 250 MG tablet Take 1 tablet by mouth every evening       medium chain triglycerides, MCT OIL, oil Take by mouth 3 times daily.      MIRALAX OR POWD STIR 1 CAPFUL (17GM) IN 8 OZ OF LIQUID AND DRINK 1 Bottle prn    triamcinolone (KENALOG) 0.1 % external cream Apply topically 2 times daily Apply to shoulder and neck.  Please dispense 45g 45 g 1    valACYclovir (VALTREX) 500 MG tablet Take 1 tablet (500 mg) by mouth 2 times daily 6 tablet 3    cephALEXin (KEFLEX) 250 MG capsule Take 1 capsule (250 mg) by mouth once as needed (UTI prophylaxis)  30 capsule 3     Allergies   Allergen Reactions    Hydrocodone Nausea and Vomiting    Latex Rash     Topical rash from gloves    Percocet [Oxycodone-Acetaminophen] Nausea and Vomiting    Sulfa Antibiotics Hives   Social History: denied smoking or recreational drug use; occasional alcohol use  Family History: denied FH of ovarian, breast, colon, pancreatic cancer.  Family History   Problem Relation Age of Onset    Lipids Mother     Arrhythmia Mother     Hypertension Mother     Hyperlipidemia Mother     Neurologic Disorder Father         Parkinson's    Hypertension Maternal Grandmother     Heart Disease Maternal Grandfather     Cancer Paternal Grandfather         G.I.    Thyroid Disease Sister         Cancer    Cancer Sister         thyroid    ALS Sister     ALS Sister     Other Cancer Sister         Thyroid Cancer    Thyroid Disease Sister    Physical Exam:   Vitals:    01/22/25 1020 01/22/25 1021   BP: (!) 154/84 (!)  "146/91   BP Location: Right arm Right arm   Patient Position: Chair Chair   Cuff Size: Adult Regular Adult Regular   Pulse: 56 59   Resp: 18    Temp: 97.1  F (36.2  C)    TempSrc: Tympanic    Weight: 75.3 kg (166 lb)    Height: 1.67 m (5' 5.75\")       Estimated body mass index is 27 kg/m  as calculated from the following:    Height as of this encounter: 1.67 m (5' 5.75\").    Weight as of this encounter: 75.3 kg (166 lb).    General appearance: well-hydrated, A&O x 3, no apparent distress  Lungs: Equal expansion bilaterally, no accessory muscle use  Heart: No heaves or thrills.   Constitutional: See vitals  Abdomen: Soft, non-tender, non-distended. No rebound, rigidity, or guarding.  Neuro: CN II-XII grossly intact  Genitourinary:  External genitalia: no erythema, no lesions.   Urethral meatus appropriate location without lesions or prolapse  Urethra: No masses, tenderness, or scarring  Bladder no fullness, masses, or tenderness.  Anus and Perineum: Unremarkable, no visible lesions  Vagina: Normal, healthy pink mucosa without any lesions. Physiologic vaginal discharge.   Cervix: normal appearance, no cervical motion tenderness.   Uterus: normal size, shape and consistency.   Adnexa: no masses or tenderness bilaterally.          "

## 2025-01-23 LAB
HPV HR 12 DNA CVX QL NAA+PROBE: NEGATIVE
HPV16 DNA CVX QL NAA+PROBE: NEGATIVE
HPV18 DNA CVX QL NAA+PROBE: NEGATIVE
HUMAN PAPILLOMA VIRUS FINAL DIAGNOSIS: NORMAL

## 2025-01-26 ENCOUNTER — HOSPITAL ENCOUNTER (OUTPATIENT)
Dept: ULTRASOUND IMAGING | Facility: CLINIC | Age: 65
Discharge: HOME | End: 2025-01-26
Attending: STUDENT IN AN ORGANIZED HEALTH CARE EDUCATION/TRAINING PROGRAM | Admitting: STUDENT IN AN ORGANIZED HEALTH CARE EDUCATION/TRAINING PROGRAM
Payer: COMMERCIAL

## 2025-01-26 DIAGNOSIS — N94.12 DEEP DYSPAREUNIA: ICD-10-CM

## 2025-01-26 PROCEDURE — 76856 US EXAM PELVIC COMPLETE: CPT

## 2025-01-27 PROBLEM — R87.810 CERVICAL HIGH RISK HPV (HUMAN PAPILLOMAVIRUS) TEST POSITIVE: Status: ACTIVE | Noted: 2017-08-03

## 2025-01-27 LAB
BKR AP ASSOCIATED HPV REPORT: NORMAL
BKR LAB AP GYN ADEQUACY: NORMAL
BKR LAB AP GYN INTERPRETATION: NORMAL
BKR LAB AP PREVIOUS ABNORMAL: NORMAL
PATH REPORT.COMMENTS IMP SPEC: NORMAL
PATH REPORT.COMMENTS IMP SPEC: NORMAL
PATH REPORT.RELEVANT HX SPEC: NORMAL

## 2025-04-14 ENCOUNTER — PATIENT OUTREACH (OUTPATIENT)
Dept: CARE COORDINATION | Facility: CLINIC | Age: 65
End: 2025-04-14
Payer: COMMERCIAL

## 2025-05-12 ENCOUNTER — PATIENT OUTREACH (OUTPATIENT)
Dept: CARE COORDINATION | Facility: CLINIC | Age: 65
End: 2025-05-12
Payer: COMMERCIAL

## 2025-05-21 ENCOUNTER — OFFICE VISIT (OUTPATIENT)
Dept: FAMILY MEDICINE | Facility: CLINIC | Age: 65
End: 2025-05-21
Attending: FAMILY MEDICINE
Payer: COMMERCIAL

## 2025-05-21 ENCOUNTER — RESULTS FOLLOW-UP (OUTPATIENT)
Dept: FAMILY MEDICINE | Facility: CLINIC | Age: 65
End: 2025-05-21

## 2025-05-21 VITALS
HEIGHT: 66 IN | TEMPERATURE: 97.2 F | RESPIRATION RATE: 18 BRPM | DIASTOLIC BLOOD PRESSURE: 70 MMHG | WEIGHT: 161.13 LBS | SYSTOLIC BLOOD PRESSURE: 118 MMHG | OXYGEN SATURATION: 99 % | HEART RATE: 60 BPM | BODY MASS INDEX: 25.9 KG/M2

## 2025-05-21 DIAGNOSIS — N39.0 RECURRENT UTI: ICD-10-CM

## 2025-05-21 DIAGNOSIS — B00.9 HERPETIC LESION: ICD-10-CM

## 2025-05-21 DIAGNOSIS — N95.2 ATROPHIC VAGINITIS: ICD-10-CM

## 2025-05-21 DIAGNOSIS — Z00.00 ROUTINE GENERAL MEDICAL EXAMINATION AT A HEALTH CARE FACILITY: Primary | ICD-10-CM

## 2025-05-21 DIAGNOSIS — Z12.31 VISIT FOR SCREENING MAMMOGRAM: ICD-10-CM

## 2025-05-21 LAB
CHOLEST SERPL-MCNC: 204 MG/DL
EST. AVERAGE GLUCOSE BLD GHB EST-MCNC: 108 MG/DL
FASTING STATUS PATIENT QL REPORTED: YES
HBA1C MFR BLD: 5.4 % (ref 0–5.6)
HDLC SERPL-MCNC: 76 MG/DL
LDLC SERPL CALC-MCNC: 117 MG/DL
NONHDLC SERPL-MCNC: 128 MG/DL
TRIGL SERPL-MCNC: 57 MG/DL

## 2025-05-21 PROCEDURE — 1126F AMNT PAIN NOTED NONE PRSNT: CPT | Performed by: FAMILY MEDICINE

## 2025-05-21 PROCEDURE — 90471 IMMUNIZATION ADMIN: CPT | Performed by: FAMILY MEDICINE

## 2025-05-21 PROCEDURE — 90677 PCV20 VACCINE IM: CPT | Performed by: FAMILY MEDICINE

## 2025-05-21 PROCEDURE — 3044F HG A1C LEVEL LT 7.0%: CPT | Performed by: FAMILY MEDICINE

## 2025-05-21 PROCEDURE — 36415 COLL VENOUS BLD VENIPUNCTURE: CPT | Performed by: FAMILY MEDICINE

## 2025-05-21 PROCEDURE — 99213 OFFICE O/P EST LOW 20 MIN: CPT | Mod: 25 | Performed by: FAMILY MEDICINE

## 2025-05-21 PROCEDURE — 83036 HEMOGLOBIN GLYCOSYLATED A1C: CPT | Performed by: FAMILY MEDICINE

## 2025-05-21 PROCEDURE — 3074F SYST BP LT 130 MM HG: CPT | Performed by: FAMILY MEDICINE

## 2025-05-21 PROCEDURE — 3078F DIAST BP <80 MM HG: CPT | Performed by: FAMILY MEDICINE

## 2025-05-21 PROCEDURE — 99396 PREV VISIT EST AGE 40-64: CPT | Mod: 25 | Performed by: FAMILY MEDICINE

## 2025-05-21 PROCEDURE — 80061 LIPID PANEL: CPT | Performed by: FAMILY MEDICINE

## 2025-05-21 RX ORDER — ESTRADIOL 0.5 MG/1
TABLET ORAL
Qty: 24 TABLET | Refills: 3 | Status: SHIPPED | OUTPATIENT
Start: 2025-05-21

## 2025-05-21 RX ORDER — VALACYCLOVIR HYDROCHLORIDE 500 MG/1
500 TABLET, FILM COATED ORAL 2 TIMES DAILY
Qty: 6 TABLET | Refills: 3 | Status: SHIPPED | OUTPATIENT
Start: 2025-05-21

## 2025-05-21 SDOH — HEALTH STABILITY: PHYSICAL HEALTH: ON AVERAGE, HOW MANY DAYS PER WEEK DO YOU ENGAGE IN MODERATE TO STRENUOUS EXERCISE (LIKE A BRISK WALK)?: 5 DAYS

## 2025-05-21 ASSESSMENT — SOCIAL DETERMINANTS OF HEALTH (SDOH): HOW OFTEN DO YOU GET TOGETHER WITH FRIENDS OR RELATIVES?: THREE TIMES A WEEK

## 2025-05-21 ASSESSMENT — PAIN SCALES - GENERAL: PAINLEVEL_OUTOF10: NO PAIN (0)

## 2025-05-21 NOTE — PROGRESS NOTES
"Preventive Care Visit  [facility]  Carley Kern MD, Family Medicine  May 21, 2025      Assessment & Plan     Routine general medical examination at a health care facility     - Hemoglobin A1c; Future  - Lipid panel reflex to direct LDL Fasting; Future    Visit for screening mammogram     - MA Screening Bilateral w/ Yusuf; Future    Atrophic vaginitis  Helpful, using when she remembers  Pap is up to date  - estradiol (ESTRACE) 0.5 MG tablet; Place one tablet vaginally twice a week at bedtime.    Herpetic lesion  Takes infrequently  - valACYclovir (VALTREX) 500 MG tablet; Take 1 tablet (500 mg) by mouth 2 times daily.    Recurrent UTI  Hasn't needed for quite some time, but likes to have on hand.   - cephALEXin (KEFLEX) 250 MG capsule; Take 1 capsule (250 mg) by mouth once as needed (UTI prophylaxis).    Patient has been advised of split billing requirements and indicates understanding: Yes        BMI  Estimated body mass index is 26.2 kg/m  as calculated from the following:    Height as of this encounter: 1.67 m (5' 5.75\").    Weight as of this encounter: 73.1 kg (161 lb 2 oz).       Counseling  Appropriate preventive services were addressed with this patient via screening, questionnaire, or discussion as appropriate for fall prevention, nutrition, physical activity, Tobacco-use cessation, social engagement, weight loss and cognition.  Checklist reviewing preventive services available has been given to the patient.  Reviewed patient's diet, addressing concerns and/or questions.           Wyatt Garvey is a 65 year old, presenting for the following:  Physical        5/21/2025     8:12 AM   Additional Questions   Roomed by Jazz PEDERSON CMA   Accompanied by Self          HPI       Advance Care Planning    Health Care Directive received at today's visit.  Forwarded to [place].        5/21/2025   General Health   How would you rate your overall physical health? Good   Feel stress " (tense, anxious, or unable to sleep) Only a little   (!) STRESS CONCERN      5/21/2025   Nutrition   Three or more servings of calcium each day? Yes   Diet: Regular (no restrictions)   How many servings of fruit and vegetables per day? (!) 2-3   How many sweetened beverages each day? 0-1         5/21/2025   Exercise   Days per week of moderate/strenous exercise 5 days         5/21/2025   Social Factors   Frequency of gathering with friends or relatives Three times a week   Worry food won't last until get money to buy more No   Food not last or not have enough money for food? No   Do you have housing? (Housing is defined as stable permanent housing and does not include staying outside in a car, in a tent, in an abandoned building, in an overnight shelter, or couch-surfing.) Yes   Are you worried about losing your housing? No   Lack of transportation? No   Unable to get utilities (heat,electricity)? No         5/21/2025   Fall Risk   Fallen 2 or more times in the past year? No   Trouble with walking or balance? No          5/21/2025   Dental   Dentist two times every year? Yes         Today's PHQ-2 Score:       5/21/2025     8:12 AM   PHQ-2 ( 1999 [employer])   Q1: Little interest or pleasure in doing things 0   Q2: Feeling down, depressed or hopeless 0   PHQ-2 Score 0    Q1: Little interest or pleasure in doing things Not at all   Q2: Feeling down, depressed or hopeless Not at all   PHQ-2 Score 0       Patient-reported           5/21/2025   Substance Use   Alcohol more than 3/day or more than 7/wk No   Do you use any other substances recreationally? No     Social History     Tobacco Use    Smoking status: Never    Smokeless tobacco: Never   Vaping Use    Vaping status: Never Used   Substance Use Topics    Alcohol use: Yes     Comment: every day, 1-2    Drug use: No           5/13/2024   LAST FHS-7 RESULTS   1st degree relative breast or ovarian cancer No   Any relative bilateral breast cancer No   Any male have breast  "cancer No   Any ONE woman have BOTH breast AND ovarian cancer No   Any woman with breast cancer before 50yrs No   2 or more relatives with breast AND/OR ovarian cancer No   2 or more relatives with breast AND/OR bowel cancer No        Mammogram Screening - Mammogram every 1-2 years updated in Health Maintenance based on mutual decision making        5/21/2025   STI Screening   New sexual partner(s) since last STI/HIV test? No     History of abnormal Pap smear: No - age 30-64 HPV with reflex Pap every 5 years recommended        Latest Ref Rng & Units 1/22/2025    10:39 AM 10/25/2021     7:48 AM 10/5/2018    11:45 AM   PAP / HPV   PAP  Negative for Intraepithelial Lesion or Malignancy (NILM)  Negative for Intraepithelial Lesion or Malignancy (NILM)     HPV 16 DNA Negative Negative  Negative  Negative    HPV 18 DNA Negative Negative  Negative  Negative    Other HR HPV Negative Negative  Negative  Negative      ASCVD Risk   The 10-year ASCVD risk score (Bucky FOSTER, et al., 2019) is: 3.4%    Values used to calculate the score:      Age: 65 years      Sex: Female      Is Non- : No      Diabetic: No      Tobacco smoker: No      Systolic Blood Pressure: 118 mmHg      Is BP treated: No      HDL Cholesterol: 97 mg/dL      Total Cholesterol: 212 mg/dL           Reviewed and updated as needed this visit by Provider                          Review of Systems  Constitutional, HEENT, cardiovascular, pulmonary, gi and gu systems are negative, except as otherwise noted.     Objective    Exam  /70   Pulse 60   Temp 97.2  F (36.2  C) (Tympanic)   Resp 18   Ht 1.67 m (5' 5.75\")   Wt 73.1 kg (161 lb 2 oz)   LMP 10/18/2012   SpO2 99%   BMI 26.20 kg/m     Estimated body mass index is 26.2 kg/m  as calculated from the following:    Height as of this encounter: 1.67 m (5' 5.75\").    Weight as of this encounter: 73.1 kg (161 lb 2 oz).    Physical Exam  GENERAL: alert and no distress  NECK: no " adenopathy, no asymmetry, masses, or scars  RESP: lungs clear to auscultation - no rales, rhonchi or wheezes  CV: regular rate and rhythm, normal S1 S2, no S3 or S4, no murmur, click or rub, no peripheral edema  MS: no gross musculoskeletal defects noted, no edema  NEURO: Normal strength and tone, mentation intact and speech normal  PSYCH: mentation appears normal, affect normal/bright        Signed Electronically by: Carley Kern MD

## 2025-05-21 NOTE — RESULT ENCOUNTER NOTE
Mare,    These labs are normal or acceptable.    Please contact my office if you have questions.    Carley Kern M.D.

## 2025-05-21 NOTE — PATIENT INSTRUCTIONS
Patient Education   Preventive Care Advice   This is general advice given by our system to help you stay healthy. However, your care team may have specific advice just for you. Please talk to your care team about your preventive care needs.  Nutrition  Eat 5 or more servings of fruits and vegetables each day.  Try wheat bread, brown rice and whole grain pasta (instead of white bread, rice, and pasta).  Get enough calcium and vitamin D. Check the label on foods and aim for 100% of the RDA (recommended daily allowance).  Lifestyle  Exercise at least 150 minutes each week  (30 minutes a day, 5 days a week).  Do muscle strengthening activities 2 days a week. These help control your weight and prevent disease.  No smoking.  Wear sunscreen to prevent skin cancer.  Have a dental exam and cleaning every 6 months.  Yearly exams  See your health care team every year to talk about:  Any changes in your health.  Any medicines your care team has prescribed.  Preventive care, family planning, and ways to prevent chronic diseases.  Shots (vaccines)   HPV shots (up to age 26), if you've never had them before.  Hepatitis B shots (up to age 59), if you've never had them before.  COVID-19 shot: Get this shot when it's due.  Flu shot: Get a flu shot every year.  Tetanus shot: Get a tetanus shot every 10 years.  Pneumococcal, hepatitis A, and RSV shots: Ask your care team if you need these based on your risk.  Shingles shot (for age 50 and up)  General health tests  Diabetes screening:  Starting at age 35, Get screened for diabetes at least every 3 years.  If you are younger than age 35, ask your care team if you should be screened for diabetes.  Cholesterol test: At age 39, start having a cholesterol test every 5 years, or more often if advised.  Bone density scan (DEXA): At age 50, ask your care team if you should have this scan for osteoporosis (brittle bones).  Hepatitis C: Get tested at least once in your life.  STIs (sexually  transmitted infections)  Before age 24: Ask your care team if you should be screened for STIs.  After age 24: Get screened for STIs if you're at risk. You are at risk for STIs (including HIV) if:  You are sexually active with more than one person.  You don't use condoms every time.  You or a partner was diagnosed with a sexually transmitted infection.  If you are at risk for HIV, ask about PrEP medicine to prevent HIV.  Get tested for HIV at least once in your life, whether you are at risk for HIV or not.  Cancer screening tests  Cervical cancer screening: If you have a cervix, begin getting regular cervical cancer screening tests starting at age 21.  Breast cancer scan (mammogram): If you've ever had breasts, begin having regular mammograms starting at age 40. This is a scan to check for breast cancer.  Colon cancer screening: It is important to start screening for colon cancer at age 45.  Have a colonoscopy test every 10 years (or more often if you're at risk) Or, ask your provider about stool tests like a FIT test every year or Cologuard test every 3 years.  To learn more about your testing options, visit:   .  For help making a decision, visit:   https://bit.ly/wq92752.  Prostate cancer screening test: If you have a prostate, ask your care team if a prostate cancer screening test (PSA) at age 55 is right for you.  Lung cancer screening: If you are a current or former smoker ages 50 to 80, ask your care team if ongoing lung cancer screenings are right for you.  For informational purposes only. Not to replace the advice of your health care provider. Copyright   2023 [employer]. All rights reserved. Clinically reviewed by the [facility] Transitions Program. [employer] 985824 - REV 01/24.

## 2025-05-22 ENCOUNTER — PATIENT OUTREACH (OUTPATIENT)
Dept: CARE COORDINATION | Facility: CLINIC | Age: 65
End: 2025-05-22
Payer: COMMERCIAL

## 2025-07-01 ENCOUNTER — HOSPITAL ENCOUNTER (OUTPATIENT)
Dept: MAMMOGRAPHY | Facility: CLINIC | Age: 65
Discharge: HOME | End: 2025-07-01
Attending: FAMILY MEDICINE
Payer: COMMERCIAL

## 2025-07-01 DIAGNOSIS — Z12.31 VISIT FOR SCREENING MAMMOGRAM: ICD-10-CM

## 2025-07-01 PROCEDURE — 77063 BREAST TOMOSYNTHESIS BI: CPT

## (undated) DEVICE — ENDO FORCEP ENDOJAW BIOPSY 2.8MMX230CM FB-220U

## (undated) DEVICE — ESU PENCIL W/COATED BLADE E2450H

## (undated) DEVICE — DRAPE POUCH INSTRUMENT 3 POCKET 1018L

## (undated) DEVICE — SOL NACL 0.9% IRRIG 1000ML BOTTLE 2F7124

## (undated) DEVICE — DRAPE O ARM TUBE 9732722

## (undated) DEVICE — STPL SKIN 35W ROTATING HEAD PRW35

## (undated) DEVICE — SUCTION MANIFOLD NEPTUNE 2 SYS 4 PORT 0702-020-000

## (undated) DEVICE — ENDO TROCAR FIRST ENTRY KII FIOS ADV FIX 05X100MM CFF03

## (undated) DEVICE — SPONGE COTTONOID 1X1" 80-1403

## (undated) DEVICE — ESU ELEC BLADE 2.75" COATED/INSULATED E1455

## (undated) DEVICE — SOL WATER IRRIG 1000ML BOTTLE 2F7114

## (undated) DEVICE — SU VICRYL 2-0 CT-2 27" UND J269H

## (undated) DEVICE — ADH SKIN CLOSURE PREMIERPRO EXOFIN 1.0ML 3470

## (undated) DEVICE — DRSG AQUACEL AG 3.5X6.0" HYDROFIBER 412010

## (undated) DEVICE — ENDO TROCAR BLUNT TIP KII BALLOON 12X100MM C0R47

## (undated) DEVICE — BLADE BONE MILL STRK 5.0MM MED 5400-701-000

## (undated) DEVICE — SU VICRYL 0 CT-2 27" J334H

## (undated) DEVICE — CELL SAVER

## (undated) DEVICE — ESU HOLSTER PLASTIC DISP E2400

## (undated) DEVICE — SPONGE SURGIFOAM 100 1974

## (undated) DEVICE — MARKER SPHERES PASSIVE MEDT PACK 5 8801075

## (undated) DEVICE — DRSG STERI STRIP 1/2X4" R1547

## (undated) DEVICE — STOCKING SLEEVE COMPRESSION CALF LG

## (undated) DEVICE — ESU PENCIL W/HOLSTER E2350H

## (undated) DEVICE — GLOVE PROTEXIS POWDER FREE 8.5 ORTHOPEDIC 2D73ET85

## (undated) DEVICE — SUCTION IRR STRYKERFLOW II W/TIP 250-070-520

## (undated) DEVICE — SYR 01ML 27GA 0.5" NDL TBC 309623

## (undated) DEVICE — KIT PATIENT CARE PROAXIS SYSTEM 6988-PV-ACP

## (undated) DEVICE — LINEN GOWN X4 5410

## (undated) DEVICE — SPONGE COTTONOID NEURO 1/2"X1/2" 30-054

## (undated) DEVICE — DRAIN ROUND W/RESERV KIT JACKSON PRATT 10FR 400ML SU130-402D

## (undated) DEVICE — NDL 18GA 1.5" 305196

## (undated) DEVICE — SOL ADH LIQUID BENZOIN SWAB 0.6ML C1544

## (undated) DEVICE — ENDO POUCH UNIV RETRIEVAL SYSTEM INZII 10MM CD001

## (undated) DEVICE — ESU LIGASURE MARYLAND LAPAROSCOPIC SLR/DVDR 5MMX37CM LF1937

## (undated) DEVICE — Device

## (undated) DEVICE — LINEN TOWEL PACK X30 5481

## (undated) DEVICE — SPONGE COTTONOID 1X3" 80-1408

## (undated) DEVICE — SU VICRYL 1 CT-1 CR 8X18" J741D

## (undated) DEVICE — SOL NACL 0.9% IRRIG 1000ML BOTTLE 07138-09

## (undated) DEVICE — LINEN STRADDLE PACK

## (undated) DEVICE — SUCTION TIP YANKAUER STR K87

## (undated) DEVICE — SU MONOCRYL 4-0 PS-2 18" UND Y496G

## (undated) DEVICE — PREP CHLORAPREP 26ML TINTED ORANGE  260815

## (undated) DEVICE — DECANTER VIAL 2006S

## (undated) DEVICE — GLOVE PROTEXIS W/NEU-THERA 6.5  2D73TE65

## (undated) DEVICE — SOL WATER IRRIG 1000ML BOTTLE 07139-09

## (undated) DEVICE — PACK SPINE INSTRUMENT DRAPE 76091-ACM7820

## (undated) DEVICE — TOOL DISSECT MIDAS MR8 14CM MATCH HEAD 3MM MR8-14MH30

## (undated) DEVICE — POSITIONER ARMBOARD FOAM 1PAIR LF FP-ARMB1

## (undated) DEVICE — STPL POWERED ECHELON 45MM PSEE45A

## (undated) DEVICE — SU VICRYL 1 CT-1 36" J347H

## (undated) DEVICE — PREP DURAPREP REMOVER 4OZ 8611

## (undated) DEVICE — STPL RELOAD REG TISSUE ECHELON 45 X 3.6MM BLUE GST45B

## (undated) DEVICE — ESU CORD BIPOLAR GREEN 10-4000

## (undated) DEVICE — GOWN XLG DISP 9545

## (undated) DEVICE — ENDO SNARE EXACTO COLD 9MM LOOP 2.4MMX230CM 00711115

## (undated) DEVICE — GLOVE PROTEXIS W/NEU-THERA 8.0  2D73TE80

## (undated) DEVICE — ENDO TROCAR SLEEVE KII ADV FIXATION 05X100MM CFS02

## (undated) DEVICE — MIDAS REX DIFFUSER LUBRICANT MR7 PA700

## (undated) DEVICE — NDL SPINAL 22GA 3.5" QUINCKE 405181

## (undated) RX ORDER — FENTANYL CITRATE 50 UG/ML
INJECTION, SOLUTION INTRAMUSCULAR; INTRAVENOUS
Status: DISPENSED
Start: 2021-09-15

## (undated) RX ORDER — ONDANSETRON 2 MG/ML
INJECTION INTRAMUSCULAR; INTRAVENOUS
Status: DISPENSED
Start: 2019-08-09

## (undated) RX ORDER — CEFAZOLIN SODIUM 2 G/100ML
INJECTION, SOLUTION INTRAVENOUS
Status: DISPENSED
Start: 2021-09-15

## (undated) RX ORDER — PROPOFOL 10 MG/ML
INJECTION, EMULSION INTRAVENOUS
Status: DISPENSED
Start: 2023-05-11

## (undated) RX ORDER — GLYCOPYRROLATE 0.2 MG/ML
INJECTION, SOLUTION INTRAMUSCULAR; INTRAVENOUS
Status: DISPENSED
Start: 2023-05-11

## (undated) RX ORDER — HYDROMORPHONE HYDROCHLORIDE 1 MG/ML
INJECTION, SOLUTION INTRAMUSCULAR; INTRAVENOUS; SUBCUTANEOUS
Status: DISPENSED
Start: 2021-09-15

## (undated) RX ORDER — BUPIVACAINE HYDROCHLORIDE AND EPINEPHRINE 5; 5 MG/ML; UG/ML
INJECTION, SOLUTION EPIDURAL; INTRACAUDAL; PERINEURAL
Status: DISPENSED
Start: 2019-08-09

## (undated) RX ORDER — PROPOFOL 10 MG/ML
INJECTION, EMULSION INTRAVENOUS
Status: DISPENSED
Start: 2019-08-09

## (undated) RX ORDER — ONDANSETRON 2 MG/ML
INJECTION INTRAMUSCULAR; INTRAVENOUS
Status: DISPENSED
Start: 2023-05-11

## (undated) RX ORDER — FENTANYL CITRATE 50 UG/ML
INJECTION, SOLUTION INTRAMUSCULAR; INTRAVENOUS
Status: DISPENSED
Start: 2019-08-09

## (undated) RX ORDER — GABAPENTIN 300 MG/1
CAPSULE ORAL
Status: DISPENSED
Start: 2021-09-15

## (undated) RX ORDER — KETOROLAC TROMETHAMINE 30 MG/ML
INJECTION, SOLUTION INTRAMUSCULAR; INTRAVENOUS
Status: DISPENSED
Start: 2021-09-15

## (undated) RX ORDER — ACETAMINOPHEN 325 MG/1
TABLET ORAL
Status: DISPENSED
Start: 2021-09-15

## (undated) RX ORDER — LIDOCAINE HYDROCHLORIDE 10 MG/ML
INJECTION, SOLUTION EPIDURAL; INFILTRATION; INTRACAUDAL; PERINEURAL
Status: DISPENSED
Start: 2019-08-09

## (undated) RX ORDER — ONDANSETRON 2 MG/ML
INJECTION INTRAMUSCULAR; INTRAVENOUS
Status: DISPENSED
Start: 2021-09-15

## (undated) RX ORDER — KETOROLAC TROMETHAMINE 30 MG/ML
INJECTION, SOLUTION INTRAMUSCULAR; INTRAVENOUS
Status: DISPENSED
Start: 2019-08-09

## (undated) RX ORDER — EPHEDRINE SULFATE 50 MG/ML
INJECTION, SOLUTION INTRAMUSCULAR; INTRAVENOUS; SUBCUTANEOUS
Status: DISPENSED
Start: 2019-08-09

## (undated) RX ORDER — MORPHINE SULFATE 1 MG/ML
INJECTION, SOLUTION EPIDURAL; INTRATHECAL; INTRAVENOUS
Status: DISPENSED
Start: 2021-09-15